# Patient Record
Sex: MALE | Race: WHITE | NOT HISPANIC OR LATINO | Employment: FULL TIME | URBAN - METROPOLITAN AREA
[De-identification: names, ages, dates, MRNs, and addresses within clinical notes are randomized per-mention and may not be internally consistent; named-entity substitution may affect disease eponyms.]

---

## 2017-01-26 DIAGNOSIS — M10.9 GOUT, UNSPECIFIED CAUSE, UNSPECIFIED CHRONICITY, UNSPECIFIED SITE: Primary | ICD-10-CM

## 2017-01-27 NOTE — TELEPHONE ENCOUNTER
Allopurinol Oral Tablet 300 MG      Last Written Prescription Date: 8/29/16  Last Fill Quantity: 30, # refills: 0  Last Office Visit with [facility], [facility] or [facility] prescribing provider:  11/15/16        URIC ACID   Date Value Ref Range Status   11/15/2016 5.5 3.5 - 8.5 mg/dL Final   ]  CREATININE   Date Value Ref Range Status   11/15/2016 0.96 0.66 - 1.25 mg/dL Final   ]  WBC      6.1   11/15/2016  RBC     5.26   11/15/2016  HGB     14.8   11/15/2016  HCT     45.5   11/15/2016  No components found with this name: mct  MCV       87   11/15/2016  MCH     28.1   11/15/2016  MCHC     32.5   11/15/2016  RDW     13.1   11/15/2016  PLT      188   11/15/2016  AST       38   11/15/2016  ALT       66   11/15/2016

## 2017-01-30 RX ORDER — ALLOPURINOL 300 MG/1
TABLET ORAL
Qty: 30 TABLET | Refills: 10 | Status: SHIPPED | OUTPATIENT
Start: 2017-01-30 | End: 2017-12-29

## 2017-01-30 NOTE — TELEPHONE ENCOUNTER
Prescription approved per [facility] Refill Protocol.  Bernie Hackett RN- Triage [employer]

## 2017-05-19 ENCOUNTER — OFFICE VISIT (OUTPATIENT)
Dept: FAMILY MEDICINE | Facility: CLINIC | Age: 47
End: 2017-05-19
Payer: COMMERCIAL

## 2017-05-19 VITALS
HEIGHT: 74 IN | DIASTOLIC BLOOD PRESSURE: 86 MMHG | HEART RATE: 71 BPM | WEIGHT: 315 LBS | SYSTOLIC BLOOD PRESSURE: 138 MMHG | RESPIRATION RATE: 14 BRPM | OXYGEN SATURATION: 96 % | BODY MASS INDEX: 40.43 KG/M2 | TEMPERATURE: 97.3 F

## 2017-05-19 DIAGNOSIS — T56.0X1S CHRONIC LEAD-INDUCED GOUT INVOLVING TOE WITHOUT TOPHUS, UNSPECIFIED LATERALITY, SEQUELA: ICD-10-CM

## 2017-05-19 DIAGNOSIS — M1A.1790 CHRONIC LEAD-INDUCED GOUT INVOLVING TOE WITHOUT TOPHUS, UNSPECIFIED LATERALITY, SEQUELA: ICD-10-CM

## 2017-05-19 DIAGNOSIS — I10 ESSENTIAL HYPERTENSION: Chronic | ICD-10-CM

## 2017-05-19 DIAGNOSIS — G47.33 OBSTRUCTIVE SLEEP APNEA SYNDROME: ICD-10-CM

## 2017-05-19 DIAGNOSIS — F33.41 RECURRENT MAJOR DEPRESSIVE DISORDER, IN PARTIAL REMISSION (H): Primary | ICD-10-CM

## 2017-05-19 DIAGNOSIS — Z13.1 SCREENING FOR DIABETES MELLITUS: ICD-10-CM

## 2017-05-19 DIAGNOSIS — F10.10 ALCOHOL ABUSE: ICD-10-CM

## 2017-05-19 DIAGNOSIS — M1A.4790 OTHER SECONDARY CHRONIC GOUT OF FOOT WITHOUT TOPHUS, UNSPECIFIED LATERALITY: ICD-10-CM

## 2017-05-19 DIAGNOSIS — Z87.891 PERSONAL HISTORY OF TOBACCO USE, PRESENTING HAZARDS TO HEALTH: ICD-10-CM

## 2017-05-19 DIAGNOSIS — M54.50 CHRONIC RIGHT-SIDED LOW BACK PAIN WITHOUT SCIATICA: ICD-10-CM

## 2017-05-19 DIAGNOSIS — E66.01 MORBID OBESITY DUE TO EXCESS CALORIES (H): ICD-10-CM

## 2017-05-19 DIAGNOSIS — Z80.0 FAMILY HISTORY OF COLON CANCER: ICD-10-CM

## 2017-05-19 DIAGNOSIS — G89.29 CHRONIC RIGHT-SIDED LOW BACK PAIN WITHOUT SCIATICA: ICD-10-CM

## 2017-05-19 DIAGNOSIS — I10 ESSENTIAL HYPERTENSION WITH GOAL BLOOD PRESSURE LESS THAN 140/90: ICD-10-CM

## 2017-05-19 DIAGNOSIS — F41.1 ANXIETY STATE: ICD-10-CM

## 2017-05-19 DIAGNOSIS — R09.02 HYPOXIA: ICD-10-CM

## 2017-05-19 PROCEDURE — 82947 ASSAY GLUCOSE BLOOD QUANT: CPT | Performed by: FAMILY MEDICINE

## 2017-05-19 PROCEDURE — 99215 OFFICE O/P EST HI 40 MIN: CPT | Performed by: FAMILY MEDICINE

## 2017-05-19 PROCEDURE — 84550 ASSAY OF BLOOD/URIC ACID: CPT | Performed by: FAMILY MEDICINE

## 2017-05-19 PROCEDURE — 84450 TRANSFERASE (AST) (SGOT): CPT | Performed by: FAMILY MEDICINE

## 2017-05-19 PROCEDURE — 84460 ALANINE AMINO (ALT) (SGPT): CPT | Performed by: FAMILY MEDICINE

## 2017-05-19 PROCEDURE — 36415 COLL VENOUS BLD VENIPUNCTURE: CPT | Performed by: FAMILY MEDICINE

## 2017-05-19 RX ORDER — CITALOPRAM HYDROBROMIDE 20 MG/1
20 TABLET ORAL DAILY
Qty: 30 TABLET | Refills: 0 | Status: SHIPPED | OUTPATIENT
Start: 2017-05-19 | End: 2017-06-02

## 2017-05-19 RX ORDER — METOPROLOL SUCCINATE 100 MG/1
100 TABLET, EXTENDED RELEASE ORAL DAILY
Qty: 90 TABLET | Refills: 0 | Status: SHIPPED | OUTPATIENT
Start: 2017-05-19 | End: 2018-01-10

## 2017-05-19 RX ORDER — LISINOPRIL 30 MG/1
30 TABLET ORAL DAILY
Qty: 90 TABLET | Refills: 0 | Status: SHIPPED | OUTPATIENT
Start: 2017-05-19 | End: 2018-01-10

## 2017-05-19 ASSESSMENT — ANXIETY QUESTIONNAIRES
2. NOT BEING ABLE TO STOP OR CONTROL WORRYING: MORE THAN HALF THE DAYS
IF YOU CHECKED OFF ANY PROBLEMS ON THIS QUESTIONNAIRE, HOW DIFFICULT HAVE THESE PROBLEMS MADE IT FOR YOU TO DO YOUR WORK, TAKE CARE OF THINGS AT HOME, OR GET ALONG WITH OTHER PEOPLE: SOMEWHAT DIFFICULT
1. FEELING NERVOUS, ANXIOUS, OR ON EDGE: NEARLY EVERY DAY
7. FEELING AFRAID AS IF SOMETHING AWFUL MIGHT HAPPEN: SEVERAL DAYS
GAD7 TOTAL SCORE: 11
3. WORRYING TOO MUCH ABOUT DIFFERENT THINGS: MORE THAN HALF THE DAYS
6. BECOMING EASILY ANNOYED OR IRRITABLE: MORE THAN HALF THE DAYS
5. BEING SO RESTLESS THAT IT IS HARD TO SIT STILL: NOT AT ALL

## 2017-05-19 ASSESSMENT — PATIENT HEALTH QUESTIONNAIRE - PHQ9: 5. POOR APPETITE OR OVEREATING: SEVERAL DAYS

## 2017-05-19 NOTE — Clinical Note
Please abstract the following data from this visit with this patient into the appropriate field in Epic:  Colonoscopy done on this date: NA (approximately), by this group: NA, results were Negative.

## 2017-05-19 NOTE — LETTER
My Depression Action Plan  Name: Homer Wells   Date of Birth 1970  Date: 5/19/2017    My doctor: Bernard Cruz   My clinic: [address] [address]  [phone]          GREEN    ZONE   Good Control    What it looks like:     Things are going generally well. You have normal up s and down s. You may even feel depressed from time to time, but bad moods usually last less than a day.   What you need to do:  1. Continue to care for yourself (see self care plan)  2. Check your depression survival kit and update it as needed  3. Follow your physician s recommendations including any medication.  4. Do not stop taking medication unless you consult with your physician first.           YELLOW         ZONE Getting Worse    What it looks like:     Depression is starting to interfere with your life.     It may be hard to get out of bed; you may be starting to isolate yourself from others.    Symptoms of depression are starting to last most all day and this has happened for several days.     You may have suicidal thoughts but they are not constant.   What you need to do:     1. Call your care team, your response to treatment will improve if you keep your care team informed of your progress. Yellow periods are signs an adjustment may need to be made.     2. Continue your self-care, even if you have to fake it!    3. Talk to someone in your support network    4. Open up your depression survival kit           RED    ZONE Medical Alert - Get Help    What it looks like:     Depression is seriously interfering with your life.     You may experience these or other symptoms: You can t get out of bed most days, can t work or engage in other necessary activities, you have trouble taking care of basic hygiene, or basic responsibilities, thoughts of suicide or death that will not go away, self-injurious behavior.     What you need  to do:  1. Call your care team and request a same-day appointment. If they are not available (weekends or after hours) call your local crisis line, emergency room or 911.      Electronically signed by: Liliana Mattson, May 19, 2017    Depression Self Care Plan / Survival Kit    Self-Care for Depression  Here s the deal. Your body and mind are really not as separate as most people think.  What you do and think affects how you feel and how you feel influences what you do and think. This means if you do things that people who feel good do, it will help you feel better.  Sometimes this is all it takes.  There is also a place for medication and therapy depending on how severe your depression is, so be sure to consult with your medical provider and/ or Behavioral Health Consultant if your symptoms are worsening or not improving.     In order to better manage my stress, I will:    Exercise  Get some form of exercise, every day. This will help reduce pain and release endorphins, the  feel good  chemicals in your brain. This is almost as good as taking antidepressants!  This is not the same as joining a gym and then never going! (they count on that by the way ) It can be as simple as just going for a walk or doing some gardening, anything that will get you moving.      Hygiene   Maintain good hygiene (Get out of bed in the morning, Make your bed, Brush your teeth, Take a shower, and Get dressed like you were going to work, even if you are unemployed).  If your clothes don't fit try to get ones that do.    Diet  I will strive to eat foods that are good for me, drink plenty of water, and avoid excessive sugar, caffeine, alcohol, and other mood-altering substances.  Some foods that are helpful in depression are: complex carbohydrates, B vitamins, flaxseed, fish or fish oil, fresh fruits and vegetables.    Psychotherapy  I agree to participate in Individual Therapy (if recommended).    Medication  If prescribed medications, I  agree to take them.  Missing doses can result in serious side effects.  I understand that drinking alcohol, or other illicit drug use, may cause potential side effects.  I will not stop my medication abruptly without first discussing it with my provider.    Staying Connected With Others  I will stay in touch with my friends, family members, and my primary care provider/team.    Use your imagination  Be creative.  We all have a creative side; it doesn t matter if it s oil painting, sand castles, or mud pies! This will also kick up the endorphins.    Witness Beauty  (AKA stop and smell the roses) Take a look outside, even in mid-winter. Notice colors, textures. Watch the squirrels and birds.     Service to others  Be of service to others.  There is always someone else in need.  By helping others we can  get out of ourselves  and remember the really important things.  This also provides opportunities for practicing all the other parts of the program.    Humor  Laugh and be silly!  Adjust your TV habits for less news and crime-drama and more comedy.    Control your stress  Try breathing deep, massage therapy, biofeedback, and meditation. Find time to relax each day.     My support system    Clinic Contact:  Phone number:    Contact 1:  Phone number:    Contact 2:  Phone number:    Presybeterian/:  Phone number:    Therapist:  Phone number:    Local crisis center:    Phone number:    Other community support:  Phone number:

## 2017-05-19 NOTE — PATIENT INSTRUCTIONS
1.  Weight Loss Tips  1. Do not eat after 6 hrs before your expected bedtime  2. Have your heaviest meal for breakfast, a slightly lighter meal at lunch and a snack 6 hrs before bed  3. No sugar/calorie drinks except milk ie no fruit juice, pop, alcohol.  4. Drink milk 30min before meals to decrease your hunger. Also it is excellent as part of your last meal of the day snack  5. Drink lots of water  6. Increase fiber in diet: all bran cereal, salads, popcorn etc  7. Have only one small serving of fruit a day about 1/2 cup (as this is high in sugar)  8. EXERCISE is the bottom line. Without it, you will gain weight even on a low calorie diet. Best if done 2-3X a day as can    Being overweight contributes to high blood pressure and high cholesterol, both of which cause heart attacks, strokes and kidney failure, prediabetes and diabetes, arthritis, and liver disease     2. Start the celexa  20mgm / tab   At 1/2 tab for 2-4 days   To alleviate the anxiety     See me in 2 weeks     YOU WILL NEED A COLONOSCOPY

## 2017-05-19 NOTE — NURSING NOTE
"Chief Complaint   Patient presents with     Recheck Medication     /86  Pulse 71  Temp 97.3  F (36.3  C) (Tympanic)  Resp 14  Ht 6' 1.5\" (1.867 m)  Wt (!) 327 lb (148.3 kg)  SpO2 96%  BMI 42.56 kg/m2 Estimated body mass index is 42.56 kg/(m^2) as calculated from the following:    Height as of this encounter: 6' 1.5\" (1.867 m).    Weight as of this encounter: 327 lb (148.3 kg).  BP completed using cuff size: large   Fanta Harrington CMA    Health Maintenance Due   Topic Date Due     JUAN C QUESTIONNAIRE 6 MONTHS  1970     PHQ-9 Q6 MONTHS (NO INBASKET)  05/15/2017     Health Maintenance reviewed at today's visit patient asked to schedule/complete:   Depression:  Patient agrees to schedule    "

## 2017-05-19 NOTE — LETTER
[facility]  [address]  [place] [address] [phone]                                                                                                           Homer Wells  [address] [place] [address]    May 24, 2017      Dear Homer,    The results of your recent tests were reviewed and are enclosed.   All of your lab results are normal, except as noted below.     The following are explanations of some of our lab tests     THIS DOES NOT MEAN THAT YOU HAD ALL OF THESE DONE     Please continue on the same medications unless a change is noted above     These are some general explanations for tests:     Hgb is the blood iron level   WBC means White Blood Cells   Platelets are small blood cells that help with forming the blood clots along with other blood factors.   Electrolytes are Sodium, Potassium, Calcium, Magnesium, Phosphorus.   Liver tests are: AST, ALT, Bilirubin, Alkaline Phosphatase.   Kidney tests are Creatinine, GFR.   HDL Cholesterol - is the good cholesterol and it is good to have it high.   LDL cholesterol is the bad cholesterol and it is good to have it low.   It is recommended to have LDL less than 130 for people with hypertension and to have it less than 100 for people with heart disease, diabetes and chronic kidney disease.   Triglycerides are another type of lipid and can also cause heart disease so should be kept low.   Thyroid tests are TSH, T4, T3   Glucose is sugar###yours is high   We need to rechek this in the future     The only way known to prevent diabetes or keep it from getting worse is exercise, 20-40 minutes 3 times a day around the time of meals as your insulin is wearing out  You need to get rid of the sugar using your muscles     A1c is a test that gives us an idea about how well was controlled the diabetes for the last 3 months.   PSA stands for Prostate Specific Antigen and it can be elevated  with prostate cancer or prostate inflammation.  Results for orders placed or performed in visit on 05/19/17   ALT   Result Value Ref [facility]    ALT 47 0 - 70 U/L   AST   Result Value Ref [facility]    AST 31 0 - 45 U/L   Glucose   Result Value Ref [facility]    Glucose 136 (H) 70 - 99 mg/dL   Uric acid   Result Value Ref [facility]    Uric Acid 5.7 3.5 - 7.2 mg/dL           Thank you for choosing [facility].  We appreciate the opportunity to serve you and look forward to supporting your healthcare needs in the future.    If you have any questions or concerns, please call me or my staff at [phone].      Sincerely,    Liliana Mattson MD

## 2017-05-19 NOTE — MR AVS SNAPSHOT
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  Weight Loss Tips  1. Do not eat after 6 hrs before your expected bedtime  2. Have your heaviest meal for breakfast, a slightly lighter meal at lunch and a snack 6 hrs before bed  3. No sugar/calorie drinks except milk ie no fruit juice, pop, alcohol.  4. Drink milk 30min before meals to decrease your hunger. Also it is excellent as part of your last meal of the day snack  5. Drink lots of water  6. Increase fiber in diet: all bran cereal, salads, popcorn etc  7. Have only one small serving of fruit a day about 1/2 cup (as this is high in sugar)  8. EXERCISE is the bottom line. Without it, you will gain weight even on a low calorie diet. Best if done 2-3X a day as can    Being overweight contributes to high blood pressure and high cholesterol, both of which cause heart attacks, strokes and kidney failure, prediabetes and diabetes, arthritis, and liver disease     2. Start the celexa  20mgm / tab   At 1/2 tab for 2-4 days   To alleviate the anxiety     See me in 2 weeks         Follow-ups after your visit        Who to contact     If you have  "questions or need follow up information about today's clinic visit or your schedule please contact [facility] directly at [phone].  Normal or non-critical lab and imaging results will be communicated to you by MyChart, letter or phone within 4 business days after the clinic has received the results. If you do not hear from us within 7 days, please contact the clinic through [employer] or phone. If you have a critical or abnormal lab result, we will notify you by phone as soon as possible.  Submit refill requests through [employer] or call your pharmacy and they will forward the refill request to us. Please allow 3 business days for your refill to be completed.          Additional Information About Your Visit        [employer] Information     [employer] lets you send messages to your doctor, view your test results, renew your prescriptions, schedule appointments and more. To sign up, go to [email or web address] . Click on \"Log in\" on the left side of the screen, which will take you to the Welcome page. Then click on \"Sign up Now\" on the right side of the page.     You will be asked to enter the access code listed below, as well as some personal information. Please follow the directions to create your username and password.     Your access code is: [email or web address]  Expires: 2017  2:33 PM     Your access code will  in 90 days. If you need help or a new code, please call your [facility] clinic or [phone].        Care EveryWhere ID     This is your Care EveryWhere ID. This could be used by other organizations to access your [facility] medical records  [id number]        Your Vitals Were     Pulse Temperature Respirations Height Pulse Oximetry BMI (Body Mass Index)    71 97.3  F (36.3  C) (Tympanic) 14 6' 1.5\" (1.867 m) 96% 42.56 kg/m2       Blood Pressure from Last 3 Encounters:   17 138/86   11/15/16 (!) 180/94   16 138/88    Weight from Last 3 Encounters:   17 (!) " 327 lb (148.3 kg)   11/15/16 (!) 332 lb (150.6 kg)   03/21/16 (!) 328 lb (148.8 kg)              We Performed the Following     ALT     AST     DEPRESSION ACTION PLAN (DAP)     Glucose     Uric acid          Today's Medication Changes          These changes are accurate as of: 5/19/17  2:33 PM.  If you have any questions, ask your nurse or doctor.               Start taking these medicines.        Dose/Directions    citalopram 20 MG tablet   Commonly known as:  celeXA   Used for:  Recurrent major depressive disorder, in partial remission (H)   Started by:  Liliana Mattson MD        Dose:  20 mg   Take 1 tablet (20 mg) by mouth daily   Quantity:  30 tablet   Refills:  0         These medicines have changed or have updated prescriptions.        Dose/Directions    metoprolol 100 MG 24 hr tablet   Commonly known as:  TOPROL-XL   This may have changed:  See the new instructions.   Used for:  Essential hypertension   Changed by:  Liliana Mattson MD        Dose:  100 mg   Take 1 tablet (100 mg) by mouth daily   Quantity:  90 tablet   Refills:  0            Where to get your medicines      These medications were sent to [id number] - [place], [place] - [address] [place] [address] [address] [facility] [address]     Phone:  [phone]     citalopram 20 MG tablet    lisinopril 30 MG tablet    metoprolol 100 MG 24 hr tablet                Primary Care Provider Office Phone # Fax #    Bernard Cruz [phone]       [facility] [address] [facility] [address]        Thank you!     Thank you for choosing [facility]  for your care. Our goal is always to provide you with excellent care. Hearing back from our patients is one way we can continue to improve our services. Please take a few minutes to complete the written survey that you may receive in the mail after your visit with us. Thank you!             Your Updated  Medication List - Protect others around you: Learn how to safely use, store and throw away your medicines at www.disposemymeds.org.          This list is accurate as of: 5/19/17  2:33 PM.  Always use your most recent med list.                   Brand Name Dispense Instructions for use    allopurinol 300 MG tablet    ZYLOPRIM    30 tablet    TAKE ONE TABLET BY MOUTH ONE TIME DAILY       citalopram 20 MG tablet    celeXA    30 tablet    Take 1 tablet (20 mg) by mouth daily       cyclobenzaprine 10 MG tablet    FLEXERIL    30 tablet    Take 0.5-1 tablets (5-10 mg) by mouth 3 times daily as needed for muscle spasms       lisinopril 30 MG tablet    PRINIVIL,ZESTRIL    90 tablet    Take 1 tablet (30 mg) by mouth daily       metoprolol 100 MG 24 hr tablet    TOPROL-XL    90 tablet    Take 1 tablet (100 mg) by mouth daily       MULTI VITAMIN MENS PO      Take 1 tablet by mouth.

## 2017-05-20 LAB
ALT SERPL W P-5'-P-CCNC: 47 U/L (ref 0–70)
AST SERPL W P-5'-P-CCNC: 31 U/L (ref 0–45)
GLUCOSE SERPL-MCNC: 136 MG/DL (ref 70–99)
URATE SERPL-MCNC: 5.7 MG/DL (ref 3.5–7.2)

## 2017-05-20 ASSESSMENT — ANXIETY QUESTIONNAIRES: GAD7 TOTAL SCORE: 11

## 2017-05-20 ASSESSMENT — PATIENT HEALTH QUESTIONNAIRE - PHQ9: SUM OF ALL RESPONSES TO PHQ QUESTIONS 1-9: 14

## 2017-05-20 ASSESSMENT — ASTHMA QUESTIONNAIRES: ACT_TOTALSCORE: 22

## 2017-05-24 NOTE — PROGRESS NOTES
Please see attached lab results  All of your lab results are normal, except as noted below.    The following are explanations of some of our lab tests    THIS DOES NOT MEAN THAT YOU HAD ALL OF THESE DONE    Please continue on the same medications unless a change is noted above    These are some general explanations for tests:    Hgb is the blood iron level  WBC means White Blood Cells  Platelets are small blood cells that help with forming the blood clots along with other blood factors.  Electrolytes are Sodium, Potassium, Calcium, Magnesium, Phosphorus.  Liver tests are: AST, ALT, Bilirubin, Alkaline Phosphatase.  Kidney tests are Creatinine, GFR.  HDL Cholesterol - is the good cholesterol and it is good to have it high.  LDL cholesterol is the bad cholesterol and it is good to have it low.  It is recommended to have LDL less than 130 for people with hypertension and to have it less than 100 for people with heart disease, diabetes and chronic kidney disease.  Triglycerides are another type of lipid and can also cause heart disease so should be kept low.   Thyroid tests are TSH, T4, T3  Glucose is sugar###yours is high   We need to rechek this in the future    The only way known to prevent diabetes or keep it from getting worse is exercise, 20-40 minutes 3 times a day around the time of meals as your insulin is wearing out  You need to get rid of the sugar using your muscles     A1c is a test that gives us an idea about how well was controlled the diabetes for the last 3 months.   PSA stands for Prostate Specific Antigen and it can be elevated with prostate cancer or prostate inflammation.

## 2017-06-02 ENCOUNTER — OFFICE VISIT (OUTPATIENT)
Dept: FAMILY MEDICINE | Facility: CLINIC | Age: 47
End: 2017-06-02
Payer: COMMERCIAL

## 2017-06-02 VITALS
DIASTOLIC BLOOD PRESSURE: 70 MMHG | OXYGEN SATURATION: 98 % | HEART RATE: 52 BPM | WEIGHT: 315 LBS | HEIGHT: 74 IN | TEMPERATURE: 96.8 F | BODY MASS INDEX: 40.43 KG/M2 | RESPIRATION RATE: 12 BRPM | SYSTOLIC BLOOD PRESSURE: 130 MMHG

## 2017-06-02 DIAGNOSIS — F10.10 ALCOHOL ABUSE: ICD-10-CM

## 2017-06-02 DIAGNOSIS — M1A.1790 CHRONIC LEAD-INDUCED GOUT INVOLVING TOE WITHOUT TOPHUS, UNSPECIFIED LATERALITY, SEQUELA: ICD-10-CM

## 2017-06-02 DIAGNOSIS — Z80.0 FAMILY HISTORY OF COLON CANCER: ICD-10-CM

## 2017-06-02 DIAGNOSIS — F41.1 ANXIETY STATE: ICD-10-CM

## 2017-06-02 DIAGNOSIS — I10 BENIGN ESSENTIAL HYPERTENSION: Primary | ICD-10-CM

## 2017-06-02 DIAGNOSIS — M54.50 ACUTE RIGHT-SIDED LOW BACK PAIN WITHOUT SCIATICA: ICD-10-CM

## 2017-06-02 DIAGNOSIS — F33.41 RECURRENT MAJOR DEPRESSIVE DISORDER, IN PARTIAL REMISSION (H): ICD-10-CM

## 2017-06-02 DIAGNOSIS — R73.01 IMPAIRED FASTING GLUCOSE: ICD-10-CM

## 2017-06-02 DIAGNOSIS — T56.0X1S CHRONIC LEAD-INDUCED GOUT INVOLVING TOE WITHOUT TOPHUS, UNSPECIFIED LATERALITY, SEQUELA: ICD-10-CM

## 2017-06-02 DIAGNOSIS — E66.01 MORBID OBESITY DUE TO EXCESS CALORIES (H): ICD-10-CM

## 2017-06-02 PROCEDURE — 99214 OFFICE O/P EST MOD 30 MIN: CPT | Performed by: FAMILY MEDICINE

## 2017-06-02 RX ORDER — CITALOPRAM HYDROBROMIDE 20 MG/1
20 TABLET ORAL DAILY
Qty: 90 TABLET | Refills: 1 | Status: SHIPPED | OUTPATIENT
Start: 2017-06-02 | End: 2017-12-16

## 2017-06-02 NOTE — PROGRESS NOTES
SUBJECTIVE:                                                    Homer Wells is a 47 year old male who presents to clinic today for the following health issues:    Depression and Anxiety Follow-Up    Status since last visit: worse    Start celexa 5-19-17    Other associated symptoms:None    Complicating factors:     Significant life event: No     Current substance abuse: None    PHQ-9 SCORE 3/21/2016 11/15/2016 5/19/2017   Total Score - - -   Total Score 2 8 14     JUAN C-7 SCORE 5/19/2017   Total Score 11      PHQ-9  English      PHQ-9   Any Language     GAD7     Hypertension Follow-up      Outpatient blood pressures are being checked at home.  Results are 133/78, 148/88.    Here  Up to  diastolic    Low Salt Diet: no added salt    Meds: toprol XL 1000 mg and lisinopril 30mgm      RT Sided Low Back Pain        Duration: Ongoing  Since 11-16        Specific cause: none    Description:   Location of pain: low back right  Character of pain: sharp  Pain radiation:none  New numbness or weakness in legs, not attributed to pain:  no     Intensity: Currently 0-1/10, worst 8/10-when spasming    History:   Pain interferes with job: YES  History of back problems: patient has prior back spasms  Any previous MRI or X-rays: yes-very long time ago, pt does not remember when  Sees a specialist for back pain:  No  Therapies tried without relief: naproxen, heat and ice    Alleviating factors:   Improved by: laying down      Precipitating factors:  Worsened by: Bending and Sitting    Functional and Psychosocial Screen (González STarT Back):      Not performed today   Accompanying Signs & Symptoms:  Risk of Fracture:  None  Risk of Cauda Equina:  None  Risk of Infection:  None  Risk of Cancer:  None  Risk of Ankylosing Spondylitis:  Onset at age <35, male, AND morning back stiffness. no              Gout  Duration: decades but no episodes for 10 + yrs despite drinking alcohol   Description   Location: big toe - bilateral  Joint  Swelling: YES  Redness: YES  Pain intensity:  moderate  Accompanying signs and symptoms: None  History  Previous history of gout: YES   Trauma to the area: no   Precipitating factors:   Alcohol usage: Frequent  Diuretic use: no   Recent illness: no   Therapies tried and outcome: allopurinol 300mgm a day and no gout since started despite continuing to drink  Uric acid conts < 6     ALCOHOL ABUSE      Duration: LATE TEENS     Description (location/character/radiation): off 5418-4506 but back on 3+++ beer 3-4 nites a wk     Intensity:  severe    Accompanying signs and symptoms: 0    History (similar episodes/previous evaluation): None    Precipitating or alleviating factors: None    Therapies tried and outcome: AA in past     LFTs never elevated despite this and morbid obesity     HYPOXIA/SLEEP APNEA       Duration: yrs     Description (location/character/radiation): oximetry 95%     Intensity:  mild    Accompanying signs and symptoms: fatigue     History (similar episodes/previous evaluation): None    Precipitating or alleviating factors: morbidly obese     Therapies tried and outcome:  c Pap q nite      TOBACCO ABUSE      1-2 ppd since 16 - 30 y/o     Sister is dying of lung ca at 51 y/o = a smoker     Assymptomatic     MORBID OBESITY      BMI = 43    Comorbid HTN     Hypoxic to 95%     FAMILY HX OF COLON CA      father with it at 64 y/o     Had 1st colonoscopy in 2003       Glucose Intolerance  Follow-up      Patient is checking blood sugars: not at all     FBS to 140    Diabetic concerns: None     Symptoms of hypoglycemia (low blood sugar): none     Paresthesias (numbness or burning in feet) or sores: No     Date of last diabetic eye exam: 2016                Amount of exercise or physical activity: None    Problems taking medications regularly: No    Medication side effects: none    Diet: regular (no restrictions)        Problem list and histories reviewed & adjusted, as indicated.  Additional history: as  "documented    Labs reviewed in EPIC    Reviewed and updated as needed this visit by clinical staff  Allergies  Meds  Problems       Reviewed and updated as needed this visit by Provider         ROS:  C: NEGATIVE for fever, chills, change in weight  I: NEGATIVE for worrisome rashes, moles or lesions  E: NEGATIVE for vision changes or irritation  E/M: NEGATIVE for ear, mouth and throat problems  R: NEGATIVE for significant cough or SOB  B: NEGATIVE for masses, tenderness or discharge  CV: NEGATIVE for chest pain, palpitations or peripheral edema  GI: NEGATIVE for nausea, abdominal pain, heartburn, or change in bowel habits  : NEGATIVE for frequency, dysuria, or hematuria  M: NEGATIVE for significant arthralgias or myalgia  N: NEGATIVE for weakness, dizziness or paresthesias  E: NEGATIVE for temperature intolerance, skin/hair changes  H: NEGATIVE for bleeding problems  PSYCHIATRIC: POSITIVE for, alcohol abuse, anxiety, depressed mood, HX anxiety, HX depression, insomnia  and weight gain    OBJECTIVE:                                                    /70  Pulse 52  Temp 96.8  F (36  C) (Tympanic)  Resp 12  Ht 6' 1.5\" (1.867 m)  Wt (!) 319 lb (144.7 kg)  SpO2 98%  BMI 41.52 kg/m2  Body mass index is 41.52 kg/(m^2).  GENERAL: healthy, alert, no distress and obese  EYES: Eyes grossly normal to inspection, PERRL and conjunctivae and sclerae normal  RESP: lungs clear to auscultation - no rales, rhonchi or wheezes  CV: regular rate and rhythm, normal S1 S2, no S3 or S4, no murmur, click or rub, no peripheral edema and peripheral pulses strong  MS: no gross musculoskeletal defects noted, no edema  SKIN: no suspicious lesions or rashes  NEURO: Normal strength and tone, mentation intact and speech normal  PSYCH: mentation appears normal, affect normal/bright    Diagnostic Test Results:  Results for orders placed or performed in visit on 05/19/17   ALT   Result Value Ref Range    ALT 47 0 - 70 U/L   AST "   Result Value Ref Range    AST 31 0 - 45 U/L   Glucose   Result Value Ref Range    Glucose 136 (H) 70 - 99 mg/dL   Uric acid   Result Value Ref Range    Uric Acid 5.7 3.5 - 7.2 mg/dL       LAB:  Liver still OK and wnl  Sugar high at 139  Uric acid still <6 on allopurinol  ASSESSMENT/PLAN:                                                              ICD-10-CM    1. Benign essential hypertension- uncontrolled on [id number] of additional I10    2. Recurrent major depressive disorder, in partial remission (H)-ISSUE OF treating  F33.41 citalopram (CELEXA) 20 MG tablet   3. Acute right-sided low back pain without sciatica M54.5    4. Impaired fasting glucose R73.01    5. Alcohol abuse since teens -off 1999-2012-still drinking  F10.10    6. Morbid obesity due to excess calories (H):BMI 40-50 w comorbid HTN  E66.01    7. Anxiety state F41.1    8. 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 Family history of colon cancer Z80.0        Patient Instructions   1. To decrease your blood pressure:   1) decrease your salt in your diet   2) increase greens   3) decrease red meat   4) increase fiber in diet   5) increase exercise    2. , Weight Loss Tips  1. Do not eat after 6 hrs before your expected bedtime  2. Have your heaviest meal for breakfast, a slightly lighter meal at lunch and a snack 6 hrs before bed  3. No sugar/calorie drinks except milk ie no fruit juice, pop, alcohol.  4. Drink milk 30min before meals to decrease your hunger. Also it is excellent as part of your last meal of the day snack  5. Drink lots of water  6. Increase fiber in diet: all bran cereal, salads, popcorn etc  7. Have only one small serving of fruit a day about 1/2 cup (as this is high in sugar)  8. EXERCISE is the bottom line. Without it, you will gain weight even on a low calorie diet. Best if done 2-3X a day as can    Being overweight contributes to high blood pressure and  high cholesterol, both of which cause heart attacks, strokes and kidney failure, prediabetes and diabetes, arthritis, and liver disease     3. Your blood pressure has been high or you are starting a new medication for it :   Please take 2-4 blood pressures and heart rates a week and write them down with date, time of day and location and bring this record in in 3-5 weeks. The optimal blood  pressure is < 140/80 or close to this most of the time.  chek every wk or 2  And call us if > 140/85     4. Flex ankles amap     5. Please stop the alcohol    6. See me in 5 mo    I  Explained the treatment and the reason for it   Discussed all with pt  And the patient expresses understanding  Pt does not want to stop drinking as feels he has no problem eg with his liver , but did discuss with him the wt gain and its risks,k the depresion and the high glucose   All from the alcohol     Liliana Mattson MD  [facility]    Weight management plan: Discussed healthy diet and exercise guidelines and patient will follow up in 6 months in clinic to re-evaluate.    Liliana Mattson MD

## 2017-06-02 NOTE — MR AVS SNAPSHOT
After Visit Summary   6/2/2017    Homer Wells    MRN: [id number]           Patient Information     Date Of Birth          1970        Visit Information        Provider Department      6/2/2017 1:20 PM Liliana Mattson MD [facility]        Today's Diagnoses     Benign essential hypertension- uncontrolled on meds     -  1    Acute right-sided low back pain without sciatica        Recurrent major depressive disorder, in partial remission (H)        Anxiety state        Alcohol abuse since teens -off 1999-2012-still drinking         Morbid obesity due to excess calories (H):BMI 40-50 w comorbid HTN         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 To decrease your blood pressure:   1) decrease your salt in your diet   2) increase greens   3) decrease red meat   4) increase fiber in diet   5) increase exercise    2. , Weight Loss Tips  1. Do not eat after 6 hrs before your expected bedtime  2. Have your heaviest meal for breakfast, a slightly lighter meal at lunch and a snack 6 hrs before bed  3. No sugar/calorie drinks except milk ie no fruit juice, pop, alcohol.  4. Drink milk 30min before meals to decrease your hunger. Also it is excellent as part of your last meal of the day snack  5. Drink lots of water  6. Increase fiber in diet: all bran cereal, salads, popcorn etc  7. Have only one small serving of fruit a day about 1/2 cup (as this is high in sugar)  8. EXERCISE is the bottom line. Without it, you will gain weight even on a low calorie diet. Best if done 2-3X a day as can    Being overweight contributes to high blood pressure and high cholesterol, both of which cause heart attacks, strokes and kidney failure,  "prediabetes and diabetes, arthritis, and liver disease     3. Your blood pressure has been high or you are starting a new medication for it :   Please take 2-4 blood pressures and heart rates a week and write them down with date, time of day and location and bring this record in in 3-5 weeks. The optimal blood  pressure is < 140/80 or close to this most of the time.  chek every wk or 2  And call us if > 140/85     4. Flex ankles amap     5. Please stop the alcohol    6. See me in 5 mo          Follow-ups after your visit        Follow-up notes from your care team     Return in about 6 months (around 12/2/2017).      Who to contact     If you have questions or need follow up information about today's clinic visit or your schedule please contact [facility] directly at [phone].  Normal or non-critical lab and imaging results will be communicated to you by MyChart, letter or phone within 4 business days after the clinic has received the results. If you do not hear from us within 7 days, please contact the clinic through MyChart or phone. If you have a critical or abnormal lab result, we will notify you by phone as soon as possible.  Submit refill requests through [employer] or call your pharmacy and they will forward the refill request to us. Please allow 3 business days for your refill to be completed.          Additional Information About Your Visit        Care EveryWhere ID     This is your Care EveryWhere ID. This could be used by other organizations to access your [place] medical records  [id number]        Your Vitals Were     Pulse Temperature Respirations Height Pulse Oximetry BMI (Body Mass Index)    52 96.8  F (36  C) (Tympanic) 12 6' 1.5\" (1.867 m) 98% 41.52 kg/m2       Blood Pressure from Last 3 Encounters:   06/02/17 130/70   05/19/17 138/86   11/15/16 (!) 180/94    Weight from Last 3 Encounters:   06/02/17 (!) 319 lb (144.7 kg)   05/19/17 (!) 327 lb (148.3 kg)   11/15/16 " (!) 332 lb (150.6 kg)              Today, you had the following     No orders found for display         Where to get your medicines      These medications were sent to [id number] - [place], [place] - [address]  [address] [place] [address]     Phone:  [phone]     citalopram 20 MG tablet          Primary Care Provider Office Phone # Fax #    Bernard Cruz [phone]       [facility] [address]  [facility] [address]        Thank you!     Thank you for choosing [facility]  for your care. Our goal is always to provide you with excellent care. Hearing back from our patients is one way we can continue to improve our services. Please take a few minutes to complete the written survey that you may receive in the mail after your visit with us. Thank you!             Your Updated Medication List - Protect others around you: Learn how to safely use, store and throw away your medicines at www.disposemymeds.org.          This list is accurate as of: 6/2/17  1:59 PM.  Always use your most recent med list.                   Brand Name Dispense Instructions for use    allopurinol 300 MG tablet    ZYLOPRIM    30 tablet    TAKE ONE TABLET BY MOUTH ONE TIME DAILY       citalopram 20 MG tablet    celeXA    90 tablet    Take 1 tablet (20 mg) by mouth daily       cyclobenzaprine 10 MG tablet    FLEXERIL    30 tablet    Take 0.5-1 tablets (5-10 mg) by mouth 3 times daily as needed for muscle spasms       lisinopril 30 MG tablet    PRINIVIL,ZESTRIL    90 tablet    Take 1 tablet (30 mg) by mouth daily       metoprolol 100 MG 24 hr tablet    TOPROL-XL    90 tablet    Take 1 tablet (100 mg) by mouth daily       MULTI VITAMIN MENS PO      Take 1 tablet by mouth.

## 2017-06-02 NOTE — NURSING NOTE
"Chief Complaint   Patient presents with     Recheck Medication     /70  Pulse 52  Temp 96.8  F (36  C) (Tympanic)  Resp 12  Ht 6' 1.5\" (1.867 m)  Wt (!) 319 lb (144.7 kg)  SpO2 98%  BMI 41.52 kg/m2 Estimated body mass index is 41.52 kg/(m^2) as calculated from the following:    Height as of this encounter: 6' 1.5\" (1.867 m).    Weight as of this encounter: 319 lb (144.7 kg).  BP completed using cuff size: kushal Harrington CMA    There are no preventive care reminders to display for this patient.  Health Maintenance reviewed at today's visit patient asked to schedule/complete:   None, Health Maintenance up to date.    "

## 2017-09-22 ENCOUNTER — TELEPHONE (OUTPATIENT)
Dept: FAMILY MEDICINE | Facility: CLINIC | Age: 47
End: 2017-09-22

## 2017-09-22 NOTE — LETTER
September 22, 2017    Homer Wells  [address] [place] [address]    Dear Roni Coronel cares about your health and your health plan.  I have reviewed your medical conditions, medication list and lab results, and am making recommendations based on this review to better manage your health.    You are in particular need of attention regarding:  -Depression/Anxiety    I am recommending that you:     -schedule a FOLLOWUP OFFICE APPOINTMENT with me.  Fill out PHQ-9 and send back in the envelope provided.      Fill out PHQ-9 and send back in the envelope provided.      Please call us at the [facility] location:  [phone] or use [employer] to address the above recommendations.     Thank you for trusting [facility].  We appreciate the opportunity to serve you and look forward to supporting your healthcare in the future.    If you have (or plan to have) any of these tests done at a facility other than a [facility] or a [facility], please have the results sent to the [facility] location noted above.      Best Regards,    Liliana Mattson MD

## 2017-09-22 NOTE — TELEPHONE ENCOUNTER
Panel Management Review      Patient has the following on his problem list:     Depression / Dysthymia review    Measure:  Needs PHQ-9 score of 4 or less during index window.  Administer PHQ-9 and if score is 5 or more, send encounter to provider for next steps.    5 - 7 month window range:     PHQ-9 SCORE 3/21/2016 11/15/2016 5/19/2017   Total Score - - -   Total Score 2 8 14       If PHQ-9 recheck is 5 or more, route to provider for next steps.    Patient is due for:  PHQ9        Composite cancer screening  Chart review shows that this patient is due/due soon for the following None  Summary:    Patient is due/failing the following:   PHQ9    Action needed:   Patient needs to do PHQ9.    Type of outreach:    Sent letter.    Questions for provider review:    None                                                                                                                                    Fanta Harrington CMA     Chart routed to  .

## 2017-12-29 DIAGNOSIS — M10.9 GOUT, UNSPECIFIED CAUSE, UNSPECIFIED CHRONICITY, UNSPECIFIED SITE: ICD-10-CM

## 2017-12-29 NOTE — TELEPHONE ENCOUNTER
Requested Prescriptions   Pending Prescriptions Disp Refills     allopurinol (ZYLOPRIM) 300 MG tablet  Last Written Prescription Date:  1/30/17  Last Fill Quantity: 30,  # refills: 10   Last Office Visit with G, P or [facility] prescribing provider:  6/2/2017   Future Office Visit:        30 tablet 10     Sig: Take 1 tablet (300 mg) by mouth daily    There is no refill protocol information for this order      Gout Agents Protocol Failed    12/29/2017  9:23 AM       Failed - CBC on file in past 12 months    Recent Labs   Lab Test  11/15/16   1501   WBC  6.1   RBC  5.26   HGB  14.8   HCT  45.5   PLT  188            Failed - Normal serum creatinine on file in the past 12 months    Recent Labs   Lab Test  11/15/16   1501   CR  0.96            Passed - ALT on file in past 12 months    Recent Labs   Lab Test  05/19/17   1435   ALT  47            Passed - Uric acid greater than or equal to 6 on file in past 12 months    Recent Labs   Lab Test  05/19/17   1435   URIC  5.7     If level is 6mg/dL or greater, ok to refill one time and refer to provider.          Passed - Recent or future visit with authorizing provider's specialty    Patient had office visit in the last year or has a visit in the next 30 days with authorizing provider.  See chart review.              Passed - Patient is age 18 or older

## 2017-12-30 DIAGNOSIS — M10.9 GOUT, UNSPECIFIED CAUSE, UNSPECIFIED CHRONICITY, UNSPECIFIED SITE: ICD-10-CM

## 2018-01-04 RX ORDER — ALLOPURINOL 300 MG/1
TABLET ORAL
Qty: 30 TABLET | Refills: 9 | OUTPATIENT
Start: 2018-01-04

## 2018-01-04 RX ORDER — ALLOPURINOL 300 MG/1
1 TABLET ORAL DAILY
Qty: 30 TABLET | Refills: 0 | Status: SHIPPED | OUTPATIENT
Start: 2018-01-04 | End: 2018-02-01

## 2018-01-04 NOTE — TELEPHONE ENCOUNTER
Requested Prescriptions   Pending Prescriptions Disp Refills     allopurinol (ZYLOPRIM) 300 MG tablet [Pharmacy Med Name: Allopurinol Oral Tablet 300 MG]  Last Written Prescription Date:  1/30/2017  Last Fill Quantity: 30 tablet,  # refills: 10   Last Office Visit with G, P or [facility] prescribing provider:  6/2/2017   Future Office Visit:      30 tablet 9     Sig: TAKE 1 TABLET BY MOUTH ONCE DAILY    Gout Agents Protocol Failed    12/30/2017  9:30 AM       Failed - CBC on file in past 12 months    Recent Labs   Lab Test  11/15/16   1501   WBC  6.1   RBC  5.26   HGB  14.8   HCT  45.5   PLT  188          Failed - Normal serum creatinine on file in the past 12 months    Recent Labs   Lab Test  11/15/16   1501   CR  0.96          Passed - ALT on file in past 12 months    Recent Labs   Lab Test  05/19/17   1435   ALT  47          Passed - Uric acid greater than or equal to 6 on file in past 12 months    Recent Labs   Lab Test  05/19/17   1435   URIC  5.7     If level is 6mg/dL or greater, ok to refill one time and refer to provider.          Passed - Recent or future visit with authorizing provider's specialty    Patient had office visit in the last year or has a visit in the next 30 days with authorizing provider.  See chart review.          Passed - Patient is age 18 or older

## 2018-01-04 NOTE — TELEPHONE ENCOUNTER
Routing refill request to provider for review/approval because:  Labs not current:  CBC and creatinine- thanks

## 2018-01-10 DIAGNOSIS — I10 ESSENTIAL HYPERTENSION: Chronic | ICD-10-CM

## 2018-01-10 DIAGNOSIS — I10 ESSENTIAL HYPERTENSION WITH GOAL BLOOD PRESSURE LESS THAN 140/90: ICD-10-CM

## 2018-01-12 RX ORDER — METOPROLOL SUCCINATE 100 MG/1
TABLET, EXTENDED RELEASE ORAL
Qty: 90 TABLET | Refills: 1 | Status: SHIPPED | OUTPATIENT
Start: 2018-01-12 | End: 2018-05-31 | Stop reason: DRUGHIGH

## 2018-01-12 RX ORDER — LISINOPRIL 30 MG/1
TABLET ORAL
Qty: 90 TABLET | Refills: 1 | Status: SHIPPED | OUTPATIENT
Start: 2018-01-12 | End: 2018-05-04

## 2018-01-12 NOTE — TELEPHONE ENCOUNTER
"Requested Prescriptions   Pending Prescriptions Disp Refills     lisinopril (PRINIVIL,ZESTRIL) 30 MG tablet [Pharmacy Med Name: Lisinopril Oral Tablet 30 MG] 90 tablet 0     Sig: TAKE 1 TABLET (30 MG) BY MOUTH DAILY    ACE Inhibitors (Including Combos) Protocol Failed    1/10/2018  4:08 PM       Failed - Normal serum creatinine on file in past 12 months    Recent Labs   Lab Test  11/15/16   1501   CR  0.96            Failed - Normal serum potassium on file in past 12 months    Recent Labs   Lab Test  11/15/16   1501   POTASSIUM  3.7            Passed - Blood pressure under 140/90    BP Readings from Last 3 Encounters:   06/02/17 130/70   05/19/17 138/86   11/15/16 (!) 180/94                Passed - Recent or future visit with authorizing provider's specialty    Patient had office visit in the last year or has a visit in the next 30 days with authorizing provider.  See \"Patient Info\" tab in inbasket, or \"Choose Columns\" in Meds & Orders section of the refill encounter.              Passed - Patient is age 18 or older        metoprolol succinate (TOPROL-XL) 100 MG 24 hr tablet [Pharmacy Med Name: Metoprolol Succinate ER Oral Tablet Extended Release 24 Hour 100 MG] 90 tablet 0     Sig: TAKE 1 TABLET (100 MG) BY MOUTH DAILY    Beta-Blockers Protocol Passed    1/10/2018  4:08 PM       Passed - Blood pressure under 140/90    BP Readings from Last 3 Encounters:   06/02/17 130/70   05/19/17 138/86   11/15/16 (!) 180/94                Passed - Patient is age 6 or older       Passed - Recent or future visit with authorizing provider's specialty    Patient had office visit in the last year or has a visit in the next 30 days with authorizing provider.  See \"Patient Info\" tab in inbasket, or \"Choose Columns\" in Meds & Orders section of the refill encounter.               Prescription approved per [facility] Refill Protocol.    "

## 2018-02-01 DIAGNOSIS — M10.9 GOUT, UNSPECIFIED CAUSE, UNSPECIFIED CHRONICITY, UNSPECIFIED SITE: ICD-10-CM

## 2018-02-01 DIAGNOSIS — I10 BENIGN ESSENTIAL HYPERTENSION: Primary | ICD-10-CM

## 2018-02-02 NOTE — TELEPHONE ENCOUNTER
" Routing refill request to provider for review/approval because:  Patient is due for creatinine check.  Routing to Dr Cruz to see if he would like to order other labs    Bernie Hackett RN- Triage [employer]          Requested Prescriptions   Pending Prescriptions Disp Refills     allopurinol (ZYLOPRIM) 300 MG tablet [Pharmacy Med Name: Allopurinol Oral Tablet 300 MG] 30 tablet 0     Sig: TAKE ONE TABLET BY MOUTH ONE TIME DAILY    Gout Agents Protocol Failed    2/1/2018  7:01 AM       Failed - CBC on file in past 12 months    Recent Labs   Lab Test  11/15/16   1501   WBC  6.1   RBC  5.26   HGB  14.8   HCT  45.5   PLT  188            Failed - Uric acid greater than or equal to 6 on file in past 12 months    Recent Labs   Lab Test  05/19/17   1435   URIC  5.7     If level is 6mg/dL or greater, ok to refill one time and refer to provider.          Failed - Normal serum creatinine on file in the past 12 months    Recent Labs   Lab Test  11/15/16   1501   CR  0.96            Passed - ALT on file in past 12 months    Recent Labs   Lab Test  05/19/17   1435   ALT  47            Passed - Recent or future visit with authorizing provider's specialty    Patient had office visit in the last year or has a visit in the next 30 days with authorizing provider.  See \"Patient Info\" tab in inbasket, or \"Choose Columns\" in Meds & Orders section of the refill encounter.            Passed - Patient is age 18 or older          "

## 2018-02-03 RX ORDER — ALLOPURINOL 300 MG/1
TABLET ORAL
Qty: 30 TABLET | Refills: 0 | Status: SHIPPED | OUTPATIENT
Start: 2018-02-03 | End: 2018-03-04

## 2018-03-04 DIAGNOSIS — M10.9 GOUT, UNSPECIFIED CAUSE, UNSPECIFIED CHRONICITY, UNSPECIFIED SITE: ICD-10-CM

## 2018-03-05 NOTE — TELEPHONE ENCOUNTER
"Requested Prescriptions   Pending Prescriptions Disp Refills     allopurinol (ZYLOPRIM) 300 MG tablet [Pharmacy Med Name: Allopurinol Oral Tablet 300 MG]  Last Written Prescription Date:  2-3-18  Last Fill Quantity: 30 tab,  # refills: 0   Last Office Visit  6/2/2017        with  G, P or [facility] prescribing provider:     Future Office Visit:        30 tablet 0     Sig: TAKE ONE TABLET BY MOUTH ONE TIME DAILY    Gout Agents Protocol Failed    3/4/2018  7:01 AM       Failed - CBC on file in past 12 months    Recent Labs   Lab Test  11/15/16   1501   WBC  6.1   RBC  5.26   HGB  14.8   HCT  45.5   PLT  188            Failed - Uric acid greater than or equal to 6 on file in past 12 months    Recent Labs   Lab Test  05/19/17   1435   URIC  5.7     If level is 6mg/dL or greater, ok to refill one time and refer to provider.          Failed - Normal serum creatinine on file in the past 12 months    Recent Labs   Lab Test  11/15/16   1501   CR  0.96            Passed - ALT on file in past 12 months    Recent Labs   Lab Test  05/19/17   1435   ALT  47            Passed - Recent (12 mo) or future (30 days) visit within the authorizing provider's specialty    Patient had office visit in the last year or has a visit in the next 30 days with authorizing provider.  See \"Patient Info\" tab in inbasket, or \"Choose Columns\" in Meds & Orders section of the refill encounter.            Passed - Patient is age 18 or older            "

## 2018-03-06 RX ORDER — ALLOPURINOL 300 MG/1
TABLET ORAL
Qty: 30 TABLET | Refills: 0 | Status: SHIPPED | OUTPATIENT
Start: 2018-03-06 | End: 2018-07-21

## 2018-03-06 NOTE — TELEPHONE ENCOUNTER
Routing refill request to provider for review/approval because:  Labs not current:  Uric  Acid, cbc, creatinine-labs pending for approval

## 2018-05-01 DIAGNOSIS — F33.41 RECURRENT MAJOR DEPRESSIVE DISORDER, IN PARTIAL REMISSION (H): ICD-10-CM

## 2018-05-01 NOTE — TELEPHONE ENCOUNTER
"Requested Prescriptions   Pending Prescriptions Disp Refills     citalopram (CELEXA) 20 MG tablet [Pharmacy Med Name: Citalopram Hydrobromide Oral Tablet 20 MG]  Last Written Prescription Date:  3/29/2018  Last Fill Quantity: 30 tablet,  # refills: 0   Last Office Visit: 6/2/2017   Future Office Visit:       PHQ-9 SCORE 3/21/2016 11/15/2016 5/19/2017   Total Score - - -   Total Score 2 8 14     JUAN C-7 SCORE 5/19/2017   Total Score 11    30 tablet 0     Sig: TAKE 1 TABLET BY MOUTH ONE TIME DAILY    SSRIs Protocol Failed    5/1/2018  7:02 AM       Failed - PHQ-9 score less than 5 in past 6 months    Please review last PHQ-9 score.          Failed - Recent (6 mo) or future (30 days) visit within the authorizing provider's specialty    Patient had office visit in the last 6 months or has a visit in the next 30 days with authorizing provider or within the authorizing provider's specialty.  See \"Patient Info\" tab in inbasket, or \"Choose Columns\" in Meds & Orders section of the refill encounter.           Passed - Patient is age 18 or older          "

## 2018-05-02 ENCOUNTER — TELEPHONE (OUTPATIENT)
Dept: FAMILY MEDICINE | Facility: CLINIC | Age: 48
End: 2018-05-02

## 2018-05-02 NOTE — TELEPHONE ENCOUNTER
Message left on answering machine asking patient to call triage.  Needs PHQ9 done.  Patient is also overdue for OV re depression

## 2018-05-02 NOTE — TELEPHONE ENCOUNTER
Panel Management Review      Patient has the following on his problem list:     Depression / Dysthymia review    Measure:  Needs PHQ-9 score of 4 or less during index window.  Administer PHQ-9 and if score is 5 or more, send encounter to provider for next steps.    5 - 7 month window range:     PHQ-9 SCORE 3/21/2016 11/15/2016 5/19/2017   Total Score - - -   Total Score 2 8 14       If PHQ-9 recheck is 5 or more, route to provider for next steps.    Patient is due for:  PHQ9      Composite cancer screening  Chart review shows that this patient is due/due soon for the following None  Summary:    Patient is due/failing the following:   PHQ9    Action needed:   Patient needs to do PHQ9.    Type of outreach:    Sent letter.    Questions for provider review:    None                                                                                                                                    Fanta Harrington CMA       Chart routed to NA .

## 2018-05-02 NOTE — LETTER
May 2, 2018    Homer Wells  [address] [place] [address]    Dear Roni Coronel cares about your health and your health plan.  I have reviewed your medical conditions, medication list and lab results, and am making recommendations based on this review to better manage your health.    You are in particular need of attention regarding:  -Depression/Anxiety    I am recommending that you:     -schedule a FOLLOWUP OFFICE APPOINTMENT with me.         Please call us at the [facility] location:  [phone] or use [employer] to address the above recommendations.     Thank you for trusting [facility].  We appreciate the opportunity to serve you and look forward to supporting your healthcare in the future.    If you have (or plan to have) any of these tests done at a facility other than a [facility] or a [facility], please have the results sent to the [facility] location noted above.      Best Regards,    Liliana Mattson MD

## 2018-05-04 ENCOUNTER — OFFICE VISIT (OUTPATIENT)
Dept: FAMILY MEDICINE | Facility: CLINIC | Age: 48
End: 2018-05-04
Payer: COMMERCIAL

## 2018-05-04 VITALS
HEART RATE: 68 BPM | OXYGEN SATURATION: 94 % | DIASTOLIC BLOOD PRESSURE: 88 MMHG | WEIGHT: 315 LBS | TEMPERATURE: 97.8 F | HEIGHT: 74 IN | BODY MASS INDEX: 40.43 KG/M2 | SYSTOLIC BLOOD PRESSURE: 138 MMHG | RESPIRATION RATE: 18 BRPM

## 2018-05-04 DIAGNOSIS — G47.00 PERSISTENT INSOMNIA: ICD-10-CM

## 2018-05-04 DIAGNOSIS — F33.41 RECURRENT MAJOR DEPRESSIVE DISORDER, IN PARTIAL REMISSION (H): ICD-10-CM

## 2018-05-04 DIAGNOSIS — I10 BENIGN ESSENTIAL HYPERTENSION: Primary | ICD-10-CM

## 2018-05-04 DIAGNOSIS — R09.02 HYPOXIA: ICD-10-CM

## 2018-05-04 DIAGNOSIS — Z80.0 FAMILY HISTORY OF COLON CANCER: ICD-10-CM

## 2018-05-04 DIAGNOSIS — F41.1 ANXIETY STATE: ICD-10-CM

## 2018-05-04 DIAGNOSIS — M1A.4790 OTHER SECONDARY CHRONIC GOUT OF FOOT WITHOUT TOPHUS, UNSPECIFIED LATERALITY: ICD-10-CM

## 2018-05-04 DIAGNOSIS — Z12.5 SCREENING FOR PROSTATE CANCER: ICD-10-CM

## 2018-05-04 DIAGNOSIS — E66.01 MORBID OBESITY DUE TO EXCESS CALORIES (H): ICD-10-CM

## 2018-05-04 DIAGNOSIS — F10.10 ALCOHOL ABUSE: ICD-10-CM

## 2018-05-04 DIAGNOSIS — R73.01 IMPAIRED FASTING GLUCOSE: ICD-10-CM

## 2018-05-04 PROBLEM — M1A.1790: Status: RESOLVED | Noted: 2017-05-19 | Resolved: 2018-05-04

## 2018-05-04 PROBLEM — T56.0X1S: Status: RESOLVED | Noted: 2017-05-19 | Resolved: 2018-05-04

## 2018-05-04 PROBLEM — F10.982 ALCOHOL-INDUCED INSOMNIA (H): Status: ACTIVE | Noted: 2018-05-04

## 2018-05-04 PROCEDURE — 82977 ASSAY OF GGT: CPT | Performed by: FAMILY MEDICINE

## 2018-05-04 PROCEDURE — 36415 COLL VENOUS BLD VENIPUNCTURE: CPT | Performed by: FAMILY MEDICINE

## 2018-05-04 PROCEDURE — 84450 TRANSFERASE (AST) (SGOT): CPT | Performed by: FAMILY MEDICINE

## 2018-05-04 PROCEDURE — 99214 OFFICE O/P EST MOD 30 MIN: CPT | Performed by: FAMILY MEDICINE

## 2018-05-04 PROCEDURE — 84460 ALANINE AMINO (ALT) (SGPT): CPT | Performed by: FAMILY MEDICINE

## 2018-05-04 PROCEDURE — G0103 PSA SCREENING: HCPCS | Performed by: FAMILY MEDICINE

## 2018-05-04 PROCEDURE — 80048 BASIC METABOLIC PNL TOTAL CA: CPT | Performed by: FAMILY MEDICINE

## 2018-05-04 PROCEDURE — 84550 ASSAY OF BLOOD/URIC ACID: CPT | Performed by: FAMILY MEDICINE

## 2018-05-04 RX ORDER — METOPROLOL SUCCINATE 50 MG/1
50 TABLET, EXTENDED RELEASE ORAL DAILY
Qty: 30 TABLET | Refills: 0 | Status: SHIPPED | OUTPATIENT
Start: 2018-05-04 | End: 2018-05-31 | Stop reason: DRUGHIGH

## 2018-05-04 RX ORDER — LISINOPRIL 40 MG/1
40 TABLET ORAL DAILY
Qty: 90 TABLET | Refills: 0 | Status: SHIPPED | OUTPATIENT
Start: 2018-05-04 | End: 2019-07-22

## 2018-05-04 RX ORDER — TRAZODONE HYDROCHLORIDE 50 MG/1
50 TABLET, FILM COATED ORAL
Qty: 30 TABLET | Refills: 0 | Status: SHIPPED | OUTPATIENT
Start: 2018-05-04 | End: 2018-05-31

## 2018-05-04 ASSESSMENT — ANXIETY QUESTIONNAIRES
7. FEELING AFRAID AS IF SOMETHING AWFUL MIGHT HAPPEN: SEVERAL DAYS
GAD7 TOTAL SCORE: 9
5. BEING SO RESTLESS THAT IT IS HARD TO SIT STILL: NOT AT ALL
1. FEELING NERVOUS, ANXIOUS, OR ON EDGE: MORE THAN HALF THE DAYS
6. BECOMING EASILY ANNOYED OR IRRITABLE: MORE THAN HALF THE DAYS
IF YOU CHECKED OFF ANY PROBLEMS ON THIS QUESTIONNAIRE, HOW DIFFICULT HAVE THESE PROBLEMS MADE IT FOR YOU TO DO YOUR WORK, TAKE CARE OF THINGS AT HOME, OR GET ALONG WITH OTHER PEOPLE: VERY DIFFICULT
2. NOT BEING ABLE TO STOP OR CONTROL WORRYING: SEVERAL DAYS
3. WORRYING TOO MUCH ABOUT DIFFERENT THINGS: MORE THAN HALF THE DAYS

## 2018-05-04 ASSESSMENT — PATIENT HEALTH QUESTIONNAIRE - PHQ9: 5. POOR APPETITE OR OVEREATING: SEVERAL DAYS

## 2018-05-04 NOTE — TELEPHONE ENCOUNTER
Routing to Dr Mattson.  Was this medication addressed today?    Bernie Hackett RN- Triage [employer]

## 2018-05-04 NOTE — LETTER
May 18, 2018      Homer STEVENS Priscilla  [address] [place] [address]        Dear ,    We are writing to inform you of your test results.    All of your lab results are normal, except as noted below.     The following are explanations of some of our lab tests     THIS DOES NOT MEAN THAT YOU HAD ALL OF THESE DONE     Please continue on the same medications unless a change is noted above     These are some general explanations for tests:     Hgb is the blood iron level   WBC means White Blood Cells   Platelets are small blood cells that help with forming the blood clots along with other blood factors.   Electrolytes are Sodium, Potassium, Calcium, Magnesium, Phosphorus.   Liver tests are: AST, ALT, Bilirubin, Alkaline Phosphatase.& GGT  Which is moderately high ####we need to rechek this ###     Kidney tests are Creatinine, GFR.   HDL Cholesterol - is the good cholesterol and it is good to have it high.   LDL cholesterol is the bad cholesterol and it is good to have it low.   It is recommended to have LDL less than 130 for people with hypertension and to have it less than 100 for people with heart disease, diabetes and chronic kidney disease.   Triglycerides are another type of lipid and can also cause heart disease so should be kept low.   Thyroid tests are TSH, T4, T3   Glucose is sugar#####It is moderately high at 136##we should also rechek this with a better study ###     A1c is a test that gives us an idea about how well was controlled the diabetes for the last 3 months.   PSA stands for Prostate Specific Antigen and it can be elevated with prostate cancer or prostate inflammation.     Uric Acid --gout ---is normal --continue the same meds    Resulted Orders   Prostate spec antigen screen   Result Value Ref Range    PSA 1.98 0 - 4 ug/L      Comment:      Assay Method:  Chemiluminescence using Siemens Vista analyzer   Basic metabolic panel   Result Value Ref Range    Sodium 140 133 - 144 mmol/L     Potassium 4.0 3.4 - 5.3 mmol/L    Chloride 106 94 - 109 mmol/L    Carbon Dioxide 29 20 - 32 mmol/L    Anion Gap 5 3 - 14 mmol/L    Glucose 99 70 - 99 mg/dL    Urea Nitrogen 18 7 - 30 mg/dL    Creatinine 0.89 0.66 - 1.25 mg/dL    GFR Estimate 90 >60 mL/min/1.7m2      Comment:      Non  GFR Calc    GFR Estimate If Black >90 >60 mL/min/1.7m2      Comment:       GFR Calc    Calcium 9.4 8.5 - 10.1 mg/dL   Uric acid   Result Value Ref Range    Uric Acid 5.5 3.5 - 7.2 mg/dL   ALT   Result Value Ref Range    ALT 48 0 - 70 U/L   AST   Result Value Ref Range    AST 33 0 - 45 U/L   GGT   Result Value Ref Range    GGT 96 (H) 0 - 75 U/L       If you have any questions or concerns, please call the clinic at the number listed above.       Sincerely,        Liliana Mattson MD

## 2018-05-04 NOTE — MR AVS SNAPSHOT
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 Shingrex is a 2 shot series that prevents shingles 97% of the time, as opposed to the old shingles shot that only prevented it at 40-50%  It costs less for medicare at a pharmacy  You should get it starting at 50 yrs old     2.  Weight Loss Tips  1. Do not eat after 6 hrs before your expected bedtime  2. Have your heaviest meal for breakfast, a slightly lighter meal at lunch and a snack 6 hrs before bed  3. No sugar/calorie drinks except milk ie no fruit juice, pop, alcohol.  4. Drink milk 30min before meals to decrease your hunger. Also it is excellent as part of your last meal of the day snack  5. Drink lots of water  6. Increase fiber in diet: all bran cereal, salads, popcorn etc  7. Have only one small serving of fruit a day about 1/2 cup (as this is high in sugar)  8. EXERCISE is the bottom line. Without it, you will gain weight even on a low calorie diet. Best if done 2-3X a day as can    Being overweight contributes to high blood  pressure and high cholesterol, both of which cause heart attacks, strokes and kidney failure, prediabetes and diabetes, arthritis, and liver disease     4. Shingrex is a 2 shot series that prevents shingles 97% of the time, as opposed to the old shingles shot that only prevented it at 40-50%  It costs less for medicare at a pharmacy  You should get it starting at 50 yrs old     5.  Eat calcium : dairy and greens  Do not take calcium in pill form as it can plaque on the heart arteries and cause kidney stones    6. Your blood pressure has been high or you are starting a new medication for it :1) increase lisinopril from 30- to 40mgm  2) increase the metoprolol from one pill of 100mgm a day  To taking one of 100mgm and one of 50mgm    Please take 2-4 blood pressures and heart rates a week and write them down with date, time of day and location and bring this record in in 3-5 weeks. The optimal blood  pressure is < 140/80 or close to this most of the time.    7. For the depression :  A. Continue the citalopram   B.   Add in trazodone at bedtime every nite   Start with 50mgm and can go to 100mgm   This treats the insomnia, the depression and anxiety             Follow-ups after your visit        Follow-up notes from your care team     Return in about 1 month (around 6/4/2018) for BP Recheck.      Who to contact     If you have questions or need follow up information about today's clinic visit or your schedule please contact [facility] directly at [phone].  Normal or non-critical lab and imaging results will be communicated to you by MyChart, letter or phone within 4 business days after the clinic has received the results. If you do not hear from us within 7 days, please contact the clinic through MyChart or phone. If you have a critical or abnormal lab result, we will notify you by phone as soon as possible.  Submit refill requests through [employer] or call your pharmacy and they will  "forward the refill request to us. Please allow 3 business days for your refill to be completed.          Additional Information About Your Visit        [employer] Information     [employer] lets you send messages to your doctor, view your test results, renew your prescriptions, schedule appointments and more. To sign up, go to [email or web address] . Click on \"Log in\" on the left side of the screen, which will take you to the Welcome page. Then click on \"Sign up Now\" on the right side of the page.     You will be asked to enter the access code listed below, as well as some personal information. Please follow the directions to create your username and password.     Your access code is: [email or web address]  Expires: 2018  3:15 PM     Your access code will  in 90 days. If you need help or a new code, please call your [place] clinic or [phone].        Care EveryWhere ID     This is your Care EveryWhere ID. This could be used by other organizations to access your [place] medical records  [id number]        Your Vitals Were     Pulse Temperature Respirations Height Pulse Oximetry BMI (Body Mass Index)    68 97.8  F (36.6  C) (Tympanic) 18 6' 1.5\" (1.867 m) 94% 42.56 kg/m2       Blood Pressure from Last 3 Encounters:   18 138/88   17 130/70   17 138/86    Weight from Last 3 Encounters:   18 327 lb (148.3 kg)   17 (!) 319 lb (144.7 kg)   17 (!) 327 lb (148.3 kg)              We Performed the Following     ALT     AST     Basic metabolic panel     DEPRESSION ACTION PLAN (DAP)     GGT     Prostate spec antigen screen     Uric acid          Today's Medication Changes          These changes are accurate as of 18  3:15 PM.  If you have any questions, ask your nurse or doctor.               Start taking these medicines.        Dose/Directions    traZODone 50 MG tablet   Commonly known as:  DESYREL   Used for:  Recurrent major depressive disorder, in partial remission (H), Persistent " insomnia   Started by:  Liliana Mattson MD        Dose:  50 mg   Take 1 tablet (50 mg) by mouth nightly as needed for sleep   Quantity:  30 tablet   Refills:  0         These medicines have changed or have updated prescriptions.        Dose/Directions    lisinopril 40 MG tablet   Commonly known as:  PRINIVIL/ZESTRIL   This may have changed:  See the new instructions.   Used for:  Benign essential hypertension   Changed by:  Liliana Mattson MD        Dose:  40 mg   Take 1 tablet (40 mg) by mouth daily   Quantity:  90 tablet   Refills:  0       * metoprolol succinate 100 MG 24 hr tablet   Commonly known as:  TOPROL-XL   This may have changed:  Another medication with the same name was added. Make sure you understand how and when to take each.   Used for:  Essential hypertension   Changed by:  Liliana Mattson MD        TAKE 1 TABLET (100 MG) BY MOUTH DAILY   Quantity:  90 tablet   Refills:  1       * metoprolol succinate 50 MG 24 hr tablet   Commonly known as:  TOPROL-XL   This may have changed:  You were already taking a medication with the same name, and this prescription was added. Make sure you understand how and when to take each.   Used for:  Benign essential hypertension   Changed by:  Liliana Mattson MD        Dose:  50 mg   Take 1 tablet (50 mg) by mouth daily   Quantity:  30 tablet   Refills:  0       * Notice:  This list has 2 medication(s) that are the same as other medications prescribed for you. Read the directions carefully, and ask your doctor or other care provider to review them with you.         Where to get your medicines      These medications were sent to [id number] - [place], [place] - [address] [place] [address] N.  [address] [place] [address] [place] [address]     Phone:  [phone]     metoprolol succinate 50 MG 24 hr tablet    traZODone 50 MG tablet         Some of these will need a paper prescription and others can be bought over the counter.  Ask your  nurse if you have questions.     Bring a paper prescription for each of these medications     lisinopril 40 MG tablet                Primary Care Provider Office Phone # Fax #    Bernard Cruz [phone] [address] [facility] [address]        Equal Access to Services     YEN MONTANO : Hadii aad ku hadasho Soomaali, waaxda luqadaha, qaybta kaalmada adeegyada, waxay chichoin hayaan adealejandro ordonezlorenacecile campos. So [facility] [phone].    ATENCIÓN: Si habla español, tiene a torres disposición servicios gratuitos de asistencia lingüística. [facility] [phone].    We comply with applicable federal civil rights laws and [place] laws. We do not discriminate on the basis of race, color, national origin, age, disability, sex, sexual orientation, or gender identity.            Thank you!     Thank you for choosing [facility]  for your care. Our goal is always to provide you with excellent care. Hearing back from our patients is one way we can continue to improve our services. Please take a few minutes to complete the written survey that you may receive in the mail after your visit with us. Thank you!             Your Updated Medication List - Protect others around you: Learn how to safely use, store and throw away your medicines at www.disposemymeds.org.          This list is accurate as of 5/4/18  3:15 PM.  Always use your most recent med list.                   Brand Name Dispense Instructions for use Diagnosis    allopurinol 300 MG tablet    ZYLOPRIM    30 tablet    TAKE ONE TABLET BY MOUTH ONE TIME DAILY    Gout, unspecified cause, unspecified chronicity, unspecified site       citalopram 20 MG tablet    celeXA    30 tablet    TAKE 1 TABLET BY MOUTH ONE TIME DAILY    Recurrent major depressive disorder, in partial remission (H)       cyclobenzaprine 10 MG tablet    FLEXERIL    30 tablet    Take 0.5-1 tablets (5-10 mg) by mouth 3 times daily as needed for muscle  spasms    Acute right-sided low back pain without sciatica       lisinopril 40 MG tablet    PRINIVIL/ZESTRIL    90 tablet    Take 1 tablet (40 mg) by mouth daily    Benign essential hypertension       * metoprolol succinate 100 MG 24 hr tablet    TOPROL-XL    90 tablet    TAKE 1 TABLET (100 MG) BY MOUTH DAILY    Essential hypertension       * metoprolol succinate 50 MG 24 hr tablet    TOPROL-XL    30 tablet    Take 1 tablet (50 mg) by mouth daily    Benign essential hypertension       MULTI VITAMIN MENS PO      Take 1 tablet by mouth.        traZODone 50 MG tablet    DESYREL    30 tablet    Take 1 tablet (50 mg) by mouth nightly as needed for sleep    Recurrent major depressive disorder, in partial remission (H), Persistent insomnia       * Notice:  This list has 2 medication(s) that are the same as other medications prescribed for you. Read the directions carefully, and ask your doctor or other care provider to review them with you.

## 2018-05-04 NOTE — PATIENT INSTRUCTIONS
1. Shingrex is a 2 shot series that prevents shingles 97% of the time, as opposed to the old shingles shot that only prevented it at 40-50%  It costs less for medicare at a pharmacy  You should get it starting at 50 yrs old     2.  Weight Loss Tips  1. Do not eat after 6 hrs before your expected bedtime  2. Have your heaviest meal for breakfast, a slightly lighter meal at lunch and a snack 6 hrs before bed  3. No sugar/calorie drinks except milk ie no fruit juice, pop, alcohol.  4. Drink milk 30min before meals to decrease your hunger. Also it is excellent as part of your last meal of the day snack  5. Drink lots of water  6. Increase fiber in diet: all bran cereal, salads, popcorn etc  7. Have only one small serving of fruit a day about 1/2 cup (as this is high in sugar)  8. EXERCISE is the bottom line. Without it, you will gain weight even on a low calorie diet. Best if done 2-3X a day as can    Being overweight contributes to high blood pressure and high cholesterol, both of which cause heart attacks, strokes and kidney failure, prediabetes and diabetes, arthritis, and liver disease     4. Shingrex is a 2 shot series that prevents shingles 97% of the time, as opposed to the old shingles shot that only prevented it at 40-50%  It costs less for medicare at a pharmacy  You should get it starting at 50 yrs old     5.  Eat calcium : dairy and greens  Do not take calcium in pill form as it can plaque on the heart arteries and cause kidney stones    6. Your blood pressure has been high or you are starting a new medication for it :1) increase lisinopril from 30- to 40mgm  2) increase the metoprolol from one pill of 100mgm a day  To taking one of 100mgm and one of 50mgm    Please take 2-4 blood pressures and heart rates a week and write them down with date, time of day and location and bring this record in in 3-5 weeks. The optimal blood  pressure is < 140/80 or close to this most of the time.    7. For the depression  :  A. Continue the citalopram   B.   Add in trazodone at bedtime every nite   Start with 50mgm and can go to 100mgm   This treats the insomnia, the depression and anxiety

## 2018-05-04 NOTE — NURSING NOTE
"Chief Complaint   Patient presents with     Recheck Medication     BP (!) 150/100  Pulse 68  Temp 97.8  F (36.6  C) (Tympanic)  Resp 18  Ht 6' 1.5\" (1.867 m)  Wt 327 lb (148.3 kg)  SpO2 94%  BMI 42.56 kg/m2 Estimated body mass index is 42.56 kg/(m^2) as calculated from the following:    Height as of this encounter: 6' 1.5\" (1.867 m).    Weight as of this encounter: 327 lb (148.3 kg).  BP completed using cuff size: large   Fanta Harrington CMA    Health Maintenance Due   Topic Date Due     HIV SCREEN (SYSTEM ASSIGNED)  03/05/1986     JUAN C QUESTIONNAIRE 6 MONTHS  11/19/2017     PHQ-9 Q6 MONTHS  11/19/2017     COLON CANCER SCREEN (SYSTEM ASSIGNED)  03/05/2018     DEPRESSION ACTION PLAN Q1 YR  05/19/2018     Health Maintenance reviewed at today's visit patient asked to schedule/complete:   Depression:  Patient agrees to schedule    "

## 2018-05-04 NOTE — LETTER
My Depression Action Plan  Name: Homer Wells   Date of Birth 1970  Date: 5/4/2018    My doctor: Bernard Cruz   My clinic: [address] [address]  [phone]          GREEN    ZONE   Good Control    What it looks like:     Things are going generally well. You have normal up s and down s. You may even feel depressed from time to time, but bad moods usually last less than a day.   What you need to do:  1. Continue to care for yourself (see self care plan)  2. Check your depression survival kit and update it as needed  3. Follow your physician s recommendations including any medication.  4. Do not stop taking medication unless you consult with your physician first.           YELLOW         ZONE Getting Worse    What it looks like:     Depression is starting to interfere with your life.     It may be hard to get out of bed; you may be starting to isolate yourself from others.    Symptoms of depression are starting to last most all day and this has happened for several days.     You may have suicidal thoughts but they are not constant.   What you need to do:     1. Call your care team, your response to treatment will improve if you keep your care team informed of your progress. Yellow periods are signs an adjustment may need to be made.     2. Continue your self-care, even if you have to fake it!    3. Talk to someone in your support network    4. Open up your depression survival kit           RED    ZONE Medical Alert - Get Help    What it looks like:     Depression is seriously interfering with your life.     You may experience these or other symptoms: You can t get out of bed most days, can t work or engage in other necessary activities, you have trouble taking care of basic hygiene, or basic responsibilities, thoughts of suicide or death that will not go away, self-injurious behavior.     What you need to  do:  1. Call your care team and request a same-day appointment. If they are not available (weekends or after hours) call your local crisis line, emergency room or 911.            Depression Self Care Plan / Survival Kit    Self-Care for Depression  Here s the deal. Your body and mind are really not as separate as most people think.  What you do and think affects how you feel and how you feel influences what you do and think. This means if you do things that people who feel good do, it will help you feel better.  Sometimes this is all it takes.  There is also a place for medication and therapy depending on how severe your depression is, so be sure to consult with your medical provider and/ or Behavioral Health Consultant if your symptoms are worsening or not improving.     In order to better manage my stress, I will:    Exercise  Get some form of exercise, every day. This will help reduce pain and release endorphins, the  feel good  chemicals in your brain. This is almost as good as taking antidepressants!  This is not the same as joining a gym and then never going! (they count on that by the way ) It can be as simple as just going for a walk or doing some gardening, anything that will get you moving.      Hygiene   Maintain good hygiene (Get out of bed in the morning, Make your bed, Brush your teeth, Take a shower, and Get dressed like you were going to work, even if you are unemployed).  If your clothes don't fit try to get ones that do.    Diet  I will strive to eat foods that are good for me, drink plenty of water, and avoid excessive sugar, caffeine, alcohol, and other mood-altering substances.  Some foods that are helpful in depression are: complex carbohydrates, B vitamins, flaxseed, fish or fish oil, fresh fruits and vegetables.    Psychotherapy  I agree to participate in Individual Therapy (if recommended).    Medication  If prescribed medications, I agree to take them.  Missing doses can result in serious  side effects.  I understand that drinking alcohol, or other illicit drug use, may cause potential side effects.  I will not stop my medication abruptly without first discussing it with my provider.    Staying Connected With Others  I will stay in touch with my friends, family members, and my primary care provider/team.    Use your imagination  Be creative.  We all have a creative side; it doesn t matter if it s oil painting, sand castles, or mud pies! This will also kick up the endorphins.    Witness Beauty  (AKA stop and smell the roses) Take a look outside, even in mid-winter. Notice colors, textures. Watch the squirrels and birds.     Service to others  Be of service to others.  There is always someone else in need.  By helping others we can  get out of ourselves  and remember the really important things.  This also provides opportunities for practicing all the other parts of the program.    Humor  Laugh and be silly!  Adjust your TV habits for less news and crime-drama and more comedy.    Control your stress  Try breathing deep, massage therapy, biofeedback, and meditation. Find time to relax each day.     My support system    Clinic Contact:  Phone number:    Contact 1:  Phone number:    Contact 2:  Phone number:    Jain/:  Phone number:    Therapist:  Phone number:    Local crisis center:    Phone number:    Other community support:  Phone number:

## 2018-05-04 NOTE — PROGRESS NOTES
SUBJECTIVE:   Homer Wells is a 48 year old male who presents to clinic today for the following health issues:    Hypertension Follow-up      Outpatient blood pressures are not being checked.    BP > 140/ for yrs     HR 60-80  So at least no longer tachycardia - on toprol 100mgm XL  ( & lisinopril 30mgm )    Low Salt Diet: low salt    ALCOHOL ABUSE    -off 1999 - 2012 and then on since -beer   - now intends to quit   -AA  Off& on since and will restart tonite   -has contd to meet with his AA friends   -seems determined to quit again       Depression and Anxiety Follow-Up      Status since last visit: Worsened     Med: contd oncelexa     See above re alcohol     Still on celexa     Other associated symptoms:None    Complicating factors:     Significant life event: bankruptcy      Current substance abuse: None    PHQ-9 3/21/2016 11/15/2016 5/19/2017                    5-4-18   Total Score 2 8 14                         13   Q9: Suicide Ideation Not at all Not at all Not at all     JUAN C-7 SCORE 5/19/2017   Total Score 11                                                                          9        PHQ-9  English  PHQ-9   Any Language  JUAN C-7  Suicide Assessment Five-step Evaluation and Treatment (SAFE-T)      Amount of exercise or physical activity: None    Problems taking medications regularly: No    Medication side effects: none    Diet: regular (no restrictions)    Gout  Duration: yrs   Description   Location: big toe - bilateral  Joint Swelling: YES  Redness: YES  Pain intensity:  moderate  Accompanying signs and symptoms: None  History  Previous history of gout: YES   Trauma to the area: no   Precipitating factors:   Alcohol usage: Frequent  Diuretic use: no   Recent illness: no   Therapies tried and outcome: 300mgm allopurinol   Last uric acid in 5-17n = 5.5     Insomnia      Duration: yrs     Description  Frequency of insomnia:  nightly  Time to fall asleep: 30 minutes  Middle of night awakening:   nightly  Early morning awakening:  nightlyAccompanying signs and symptoms:  depression/mood changes & alcoho abuse  & VONNIE with cPAP    History  Similar episodes in past:  YES  Previous evaluation/sleep study:  YES    Precipitating or alleviating factors:  New stressful situation: YES- trying to quit alcohol abuse  Caffeine intake after lunchtime: no   OTC decongestants: no   Any new medications: no     Therapies tried and outcome: none      MORBID OBESITY    -BMI= 42.7  -comorbid HTN, glu intol, gout     HYPOXIA     -95%   - BMI = 42.7 so abdom compresses lungs   -has VONNIE     Glucose Intolerance   Follow-up      Patient is checking blood sugars: not at all    FBS to 133    Diabetic concerns: None     Symptoms of hypoglycemia (low blood sugar): none     Paresthesias (numbness or burning in feet) or sores: No     Date of last diabetic eye exam: ?2016    BP Readings from Last 2 Encounters:   05/04/18 138/88   06/02/17 130/70     LDL Cholesterol Calculated (mg/dL)   Date Value   06/04/2015 86     LDL Cholesterol Direct (mg/dL)   Date Value   06/13/2013 126       Problem list and histories reviewed & adjusted, as indicated.  Additional history: as documented    Labs reviewed in EPIC    Reviewed and updated as needed this visit by clinical staff  Tobacco  Allergies  Meds  Med Hx  Surg Hx  Fam Hx  Soc Hx      Reviewed and updated as needed this visit by Provider         ROS:  CONSTITUTIONAL: NEGATIVE for fever, chills, change in weight  INTEGUMENTARY/SKIN: NEGATIVE for worrisome rashes, moles or lesions  EYES: NEGATIVE for vision changes or irritation  ENT/MOUTH: NEGATIVE for ear, mouth and throat problems  RESP: NEGATIVE for significant cough or SOB  BREAST: NEGATIVE for masses, tenderness or discharge  CV: NEGATIVE for chest pain, palpitations or peripheral edema  GI: NEGATIVE for nausea, abdominal pain, heartburn, or change in bowel habits  : NEGATIVE for frequency, dysuria, or hematuria  MUSCULOSKELETAL:  "NEGATIVE for significant arthralgias or myalgia  NEURO: NEGATIVE for weakness, dizziness or paresthesias  ENDOCRINE: NEGATIVE for temperature intolerance, skin/hair changes  HEME: NEGATIVE for bleeding problems  PSYCHIATRIC: POSITIVE for, alcohol abuse, anxiety, concentration difficulty, depressed mood, HX anxiety, HX depression, fatigue, insomnia , stress bankruptcy  and weight gain    OBJECTIVE:     /88  Pulse 68  Temp 97.8  F (36.6  C) (Tympanic)  Resp 18  Ht 6' 1.5\" (1.867 m)  Wt 327 lb (148.3 kg)  SpO2 94%  BMI 42.56 kg/m2  Body mass index is 42.56 kg/(m^2).  GENERAL: healthy, alert, no distress, obese, elderly and fatigued  EYES: Eyes grossly normal to inspection, PERRL and conjunctivae and sclerae normal  RESP: lungs clear to auscultation - no rales, rhonchi or wheezes  CV: regular rate and rhythm, normal S1 S2, no S3 or S4, no murmur, click or rub, no peripheral edema and peripheral pulses strong  MS: no gross musculoskeletal defects noted, no edema  SKIN: no suspicious lesions or rashes  Simon   NEURO: Normal strength and tone, mentation intact and speech normal  PSYCH: mentation appears normal, tangential, affect normal/bright, anxious, fatigued and appearance well groomed    Diagnostic Test Results:  Results for orders placed or performed in visit on 05/19/17   ALT   Result Value Ref Range    ALT 47 0 - 70 U/L   AST   Result Value Ref Range    AST 31 0 - 45 U/L   Glucose   Result Value Ref Range    Glucose 136 (H) 70 - 99 mg/dL   Uric acid   Result Value Ref Range    Uric Acid 5.7 3.5 - 7.2 mg/dL       ASSESSMENT/PLAN:               ICD-10-CM    1. Benign essential hypertension- uncontrolled on meds  I10 Basic metabolic panel     lisinopril (PRINIVIL/ZESTRIL) 40 MG tablet     metoprolol succinate (TOPROL-XL) 50 MG 24 hr tablet   2. Alcohol abuse since teens -off 1999-2012-restart-quitting 5-18 F10.10    3. Persistent insomnia-issue of starting treatment  G47.00 traZODone (DESYREL) 50 MG " tablet   4. Anxiety state F41.1    5. Impaired fasting glucose R73.01 Basic metabolic panel   6. 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 Hypoxia R09.02    8. Morbid obesity due to excess calories (H):BMI 40-50 w comorbid HTN  E66.01    9. Screening for prostate cancer Z12.5 Prostate spec antigen screen   10. Recurrent major depressive disorder, in partial remission (H)- issue of increasing treatment  F33.41 traZODone (DESYREL) 50 MG tablet   11. Family history of colon cancer Z80.0        Patient Instructions   1. Shingrex is a 2 shot series that prevents shingles 97% of the time, as opposed to the old shingles shot that only prevented it at 40-50%  It costs less for medicare at a pharmacy  You should get it starting at 50 yrs old     2.  Weight Loss Tips  1. Do not eat after 6 hrs before your expected bedtime  2. Have your heaviest meal for breakfast, a slightly lighter meal at lunch and a snack 6 hrs before bed  3. No sugar/calorie drinks except milk ie no fruit juice, pop, alcohol.  4. Drink milk 30min before meals to decrease your hunger. Also it is excellent as part of your last meal of the day snack  5. Drink lots of water  6. Increase fiber in diet: all bran cereal, salads, popcorn etc  7. Have only one small serving of fruit a day about 1/2 cup (as this is high in sugar)  8. EXERCISE is the bottom line. Without it, you will gain weight even on a low calorie diet. Best if done 2-3X a day as can    Being overweight contributes to high blood pressure and high cholesterol, both of which cause heart attacks, strokes and kidney failure, prediabetes and diabetes, arthritis, and liver disease     4. Shingrex is a 2 shot series that prevents shingles 97% of the time, as opposed to the old shingles shot that only prevented it at 40-50%  It costs less for medicare at a pharmacy  You should get it starting at 50 yrs old     5.  Eat calcium : dairy  and greens  Do not take calcium in pill form as it can plaque on the heart arteries and cause kidney stones    6. Your blood pressure has been high or you are starting a new medication for it :1) increase lisinopril from 30- to 40mgm  2) increase the metoprolol from one pill of 100mgm a day  To taking one of 100mgm and one of 50mgm    Please take 2-4 blood pressures and heart rates a week and write them down with date, time of day and location and bring this record in in 3-5 weeks. The optimal blood  pressure is < 140/80 or close to this most of the time.    7. For the depression :  A. Continue the citalopram   B.   Add in trazodone at bedtime every nite   Start with 50mgm and can go to 100mgm   This treats the insomnia, the depression and anxiety         Liliana Mattson MD  [facility]    Weight management plan: Discussed healthy diet and exercise guidelines and patient will follow up in 6 months in clinic to re-evaluate.    Lliiana Mattson MD

## 2018-05-05 LAB
ALT SERPL W P-5'-P-CCNC: 48 U/L (ref 0–70)
ANION GAP SERPL CALCULATED.3IONS-SCNC: 5 MMOL/L (ref 3–14)
AST SERPL W P-5'-P-CCNC: 33 U/L (ref 0–45)
BUN SERPL-MCNC: 18 MG/DL (ref 7–30)
CALCIUM SERPL-MCNC: 9.4 MG/DL (ref 8.5–10.1)
CHLORIDE SERPL-SCNC: 106 MMOL/L (ref 94–109)
CO2 SERPL-SCNC: 29 MMOL/L (ref 20–32)
CREAT SERPL-MCNC: 0.89 MG/DL (ref 0.66–1.25)
GFR SERPL CREATININE-BSD FRML MDRD: 90 ML/MIN/1.7M2
GGT SERPL-CCNC: 96 U/L (ref 0–75)
GLUCOSE SERPL-MCNC: 99 MG/DL (ref 70–99)
POTASSIUM SERPL-SCNC: 4 MMOL/L (ref 3.4–5.3)
PSA SERPL-ACNC: 1.98 UG/L (ref 0–4)
SODIUM SERPL-SCNC: 140 MMOL/L (ref 133–144)
URATE SERPL-MCNC: 5.5 MG/DL (ref 3.5–7.2)

## 2018-05-05 ASSESSMENT — ANXIETY QUESTIONNAIRES: GAD7 TOTAL SCORE: 9

## 2018-05-05 ASSESSMENT — PATIENT HEALTH QUESTIONNAIRE - PHQ9: SUM OF ALL RESPONSES TO PHQ QUESTIONS 1-9: 13

## 2018-05-10 RX ORDER — CITALOPRAM HYDROBROMIDE 20 MG/1
TABLET ORAL
Qty: 30 TABLET | Refills: 0 | Status: SHIPPED | OUTPATIENT
Start: 2018-05-10 | End: 2018-06-07

## 2018-05-18 NOTE — PROGRESS NOTES
Please see attached lab results  All of your lab results are normal, except as noted below.    The following are explanations of some of our lab tests    THIS DOES NOT MEAN THAT YOU HAD ALL OF THESE DONE    Please continue on the same medications unless a change is noted above    These are some general explanations for tests:    Hgb is the blood iron level  WBC means White Blood Cells  Platelets are small blood cells that help with forming the blood clots along with other blood factors.  Electrolytes are Sodium, Potassium, Calcium, Magnesium, Phosphorus.  Liver tests are: AST, ALT, Bilirubin, Alkaline Phosphatase.& GGT  Which is moderately high ####we need to rechek this ###    Kidney tests are Creatinine, GFR.  HDL Cholesterol - is the good cholesterol and it is good to have it high.  LDL cholesterol is the bad cholesterol and it is good to have it low.  It is recommended to have LDL less than 130 for people with hypertension and to have it less than 100 for people with heart disease, diabetes and chronic kidney disease.  Triglycerides are another type of lipid and can also cause heart disease so should be kept low.   Thyroid tests are TSH, T4, T3  Glucose is sugar#####It is moderately high at 136##we should also rechek this with a better study ###    A1c is a test that gives us an idea about how well was controlled the diabetes for the last 3 months.   PSA stands for Prostate Specific Antigen and it can be elevated with prostate cancer or prostate inflammation.    Uric Acid --gout ---is normal --continue the same meds

## 2018-05-31 ENCOUNTER — OFFICE VISIT (OUTPATIENT)
Dept: FAMILY MEDICINE | Facility: CLINIC | Age: 48
End: 2018-05-31
Payer: COMMERCIAL

## 2018-05-31 VITALS
WEIGHT: 315 LBS | DIASTOLIC BLOOD PRESSURE: 118 MMHG | TEMPERATURE: 97.5 F | SYSTOLIC BLOOD PRESSURE: 170 MMHG | HEIGHT: 74 IN | HEART RATE: 70 BPM | OXYGEN SATURATION: 98 % | RESPIRATION RATE: 18 BRPM | BODY MASS INDEX: 40.43 KG/M2

## 2018-05-31 DIAGNOSIS — I10 BENIGN ESSENTIAL HYPERTENSION: Primary | ICD-10-CM

## 2018-05-31 DIAGNOSIS — F41.1 ANXIETY STATE: ICD-10-CM

## 2018-05-31 DIAGNOSIS — G47.00 PERSISTENT INSOMNIA: ICD-10-CM

## 2018-05-31 DIAGNOSIS — F32.5 MAJOR DEPRESSION IN COMPLETE REMISSION (H): ICD-10-CM

## 2018-05-31 DIAGNOSIS — F10.10 ALCOHOL ABUSE: ICD-10-CM

## 2018-05-31 DIAGNOSIS — R73.01 IMPAIRED FASTING GLUCOSE: ICD-10-CM

## 2018-05-31 DIAGNOSIS — M1A.4790 OTHER SECONDARY CHRONIC GOUT OF FOOT WITHOUT TOPHUS, UNSPECIFIED LATERALITY: ICD-10-CM

## 2018-05-31 DIAGNOSIS — E66.01 MORBID OBESITY DUE TO EXCESS CALORIES (H): ICD-10-CM

## 2018-05-31 PROBLEM — F33.41 RECURRENT MAJOR DEPRESSIVE DISORDER, IN PARTIAL REMISSION (H): Status: RESOLVED | Noted: 2017-05-19 | Resolved: 2018-05-31

## 2018-05-31 PROCEDURE — 99214 OFFICE O/P EST MOD 30 MIN: CPT | Performed by: FAMILY MEDICINE

## 2018-05-31 RX ORDER — METOPROLOL SUCCINATE 50 MG/1
50 TABLET, EXTENDED RELEASE ORAL DAILY
Qty: 30 TABLET | Refills: 0 | Status: CANCELLED | OUTPATIENT
Start: 2018-05-31

## 2018-05-31 RX ORDER — METOPROLOL SUCCINATE 100 MG/1
TABLET, EXTENDED RELEASE ORAL
Qty: 90 TABLET | Refills: 1 | Status: CANCELLED | OUTPATIENT
Start: 2018-05-31

## 2018-05-31 RX ORDER — METOPROLOL TARTRATE 50 MG
50 TABLET ORAL 2 TIMES DAILY
Qty: 180 TABLET | Refills: 0 | Status: SHIPPED | OUTPATIENT
Start: 2018-05-31 | End: 2019-11-19

## 2018-05-31 RX ORDER — METOPROLOL TARTRATE 100 MG
100 TABLET ORAL 2 TIMES DAILY
Qty: 180 TABLET | Refills: 0 | Status: SHIPPED | OUTPATIENT
Start: 2018-05-31 | End: 2019-11-19

## 2018-05-31 RX ORDER — TRAZODONE HYDROCHLORIDE 50 MG/1
50 TABLET, FILM COATED ORAL
Qty: 90 TABLET | Refills: 0 | Status: SHIPPED | OUTPATIENT
Start: 2018-05-31 | End: 2019-09-12

## 2018-05-31 ASSESSMENT — ANXIETY QUESTIONNAIRES
GAD7 TOTAL SCORE: 2
3. WORRYING TOO MUCH ABOUT DIFFERENT THINGS: NOT AT ALL
2. NOT BEING ABLE TO STOP OR CONTROL WORRYING: NOT AT ALL
5. BEING SO RESTLESS THAT IT IS HARD TO SIT STILL: NOT AT ALL
7. FEELING AFRAID AS IF SOMETHING AWFUL MIGHT HAPPEN: NOT AT ALL
IF YOU CHECKED OFF ANY PROBLEMS ON THIS QUESTIONNAIRE, HOW DIFFICULT HAVE THESE PROBLEMS MADE IT FOR YOU TO DO YOUR WORK, TAKE CARE OF THINGS AT HOME, OR GET ALONG WITH OTHER PEOPLE: SOMEWHAT DIFFICULT
1. FEELING NERVOUS, ANXIOUS, OR ON EDGE: SEVERAL DAYS
6. BECOMING EASILY ANNOYED OR IRRITABLE: SEVERAL DAYS

## 2018-05-31 ASSESSMENT — PATIENT HEALTH QUESTIONNAIRE - PHQ9: 5. POOR APPETITE OR OVEREATING: NOT AT ALL

## 2018-05-31 NOTE — PROGRESS NOTES
SUBJECTIVE:   Homer Wells is a 48 year old male who presents to clinic today for the following health issues:    Hypertension Follow-up      Outpatient blood pressures are not being checked.as his arm is too big for the cuffs     BP > 140/ for yrs & is 170/118ntoday     HR 60-80  So at least no longer tachycardia - on toprol 100mgm XL -was on 150mgm till 5-25-18 when ran out of th 100mgm XL tabs  ( & lisinopril 40mgm )    Low Salt Diet: low salt    ALCOHOL ABUSE    -off 1999 - 2012 and then on since -beer   - now intends to quit   -AA  Off& on since and will restart tonite   -has contd to meet with his AA friends   -seems determined to quit again but conts to drink qod=beer   GGT was 96        Depression and Anxiety Follow-Up      Status since last visit: now controlled on continueing the celexa and adding trazodone q HS     Med: contd oncelexa     See above re alcohol     Still on celexa     Other associated symptoms:None    Complicating factors:     Significant life event: bankruptcy      Current substance abuse: None    PHQ-9 3/21/2016 11/15/2016 5/19/2017                    5-4-18    Total Score 2 8 14                         13   Q9: Suicide Ideation Not at all Not at all Not at all     JUAN C-7 SCORE 5/19/2017   Total Score 11                                                                          9        PHQ-9  English=5  PHQ-9   Any Language  JUAN C-7=2  Suicide Assessment Five-step Evaluation and Treatment (SAFE-T)      Amount of exercise or physical activity: None    Problems taking medications regularly: No    Medication side effects: none    Diet: regular (no restrictions)    Gout  Duration: yrs   Description   Location: big toe - bilateral  Joint Swelling: YES  Redness: YES  Pain intensity:  moderate  Accompanying signs and symptoms: None  History  Previous history of gout: YES   Trauma to the area: no   Precipitating factors:   Alcohol usage: Frequent  Diuretic use: no   Recent illness: no    Therapies tried and outcome: 300mgm allopurinol   Last uric acid in 5-17n = 5.5     Insomnia      Duration: yrs     Description  Frequency of insomnia:  nightly  Time to fall asleep: 30 minutes  Middle of night awakening:  nightly  Early morning awakening:  nightlyAccompanying signs and symptoms:  depression/mood changes & alcoho abuse  & VONNIE with cPAP    History  Similar episodes in past:  YES  Previous evaluation/sleep study:  YES    Precipitating or alleviating factors:  New stressful situation: YES- trying to quit alcohol abuse  Caffeine intake after lunchtime: no   OTC decongestants: no   Any new medications: no     Therapies tried and outcome: trazodone 50mgm --> sleeps well takes it at 5pm     Was drowsy in am at 1st , butnow not       MORBID OBESITY    -BMI= 42.7  -comorbid HTN, glu intol, gout     HYPOXIA     -95%   - BMI = 42.7 so abdom compresses lungs   -has VONNIE     Glucose Intolerance   Follow-up      Patient is checking blood sugars: not at all    FBS to 133    Diabetic concerns: None     Symptoms of hypoglycemia (low blood sugar): none     Paresthesias (numbness or burning in feet) or sores: No     Date of last diabetic eye exam: ?2016    BP Readings from Last 2 Encounters:   05/31/18 (!) 170/118   05/04/18 138/88     LDL Cholesterol Calculated (mg/dL)   Date Value   06/04/2015 86     LDL Cholesterol Direct (mg/dL)   Date Value   06/13/2013 126       Problem list and histories reviewed & adjusted, as indicated.  Additional history: as documented    Labs reviewed in EPIC    Reviewed and updated as needed this visit by clinical staff  Tobacco  Allergies  Meds  Problems  Med Hx  Surg Hx  Fam Hx  Soc Hx        Reviewed and updated as needed this visit by Provider  Allergies  Meds  Problems         ROS:  CONSTITUTIONAL: NEGATIVE for fever, chills, change in weight  INTEGUMENTARY/SKIN: NEGATIVE for worrisome rashes, moles or lesions  EYES: NEGATIVE for vision changes or irritation  ENT/MOUTH:  "NEGATIVE for ear, mouth and throat problems  RESP: NEGATIVE for significant cough or SOB  BREAST: NEGATIVE for masses, tenderness or discharge  CV: NEGATIVE for chest pain, palpitations or peripheral edema  GI: NEGATIVE for nausea, abdominal pain, heartburn, or change in bowel habits  : NEGATIVE for frequency, dysuria, or hematuria  MUSCULOSKELETAL: NEGATIVE for significant arthralgias or myalgia  NEURO: NEGATIVE for weakness, dizziness or paresthesias  ENDOCRINE: NEGATIVE for temperature intolerance, skin/hair changes  HEME: NEGATIVE for bleeding problems  PSYCHIATRIC: POSITIVE for, alcohol abuse, anxiety, concentration difficulty, depressed mood, HX anxiety, HX depression, fatigue, insomnia , stress bankruptcy  and weight gain    OBJECTIVE:     BP (!) 170/118  Pulse 70  Temp 97.5  F (36.4  C) (Tympanic)  Resp 18  Ht 6' 1.5\" (1.867 m)  Wt 327 lb (148.3 kg)  SpO2 98%  BMI 42.56 kg/m2  Body mass index is 42.56 kg/(m^2).  GENERAL: healthy, alert, no distress, obese, elderly and fatigued  EYES: Eyes grossly normal to inspection, PERRL and conjunctivae and sclerae normal  RESP: lungs clear to auscultation - no rales, rhonchi or wheezes  CV: regular rate and rhythm, normal S1 S2, no S3 or S4, no murmur, click or rub, no peripheral edema and peripheral pulses strong  MS: no gross musculoskeletal defects noted, no edema  SKIN: no suspicious lesions or rashes  Simon   NEURO: Normal strength and tone, mentation intact and speech normal  PSYCH: mentation appears normal, tangential, affect normal/bright, anxious, fatigued and appearance well groomed    Diagnostic Test Results:  Results for orders placed or performed in visit on 05/04/18   Prostate spec antigen screen   Result Value Ref Range    PSA 1.98 0 - 4 ug/L   Basic metabolic panel   Result Value Ref Range    Sodium 140 133 - 144 mmol/L    Potassium 4.0 3.4 - 5.3 mmol/L    Chloride 106 94 - 109 mmol/L    Carbon Dioxide 29 20 - 32 mmol/L    Anion Gap 5 3 - 14 " mmol/L    Glucose 99 70 - 99 mg/dL    Urea Nitrogen 18 7 - 30 mg/dL    Creatinine 0.89 0.66 - 1.25 mg/dL    GFR Estimate 90 >60 mL/min/1.7m2    GFR Estimate If Black >90 >60 mL/min/1.7m2    Calcium 9.4 8.5 - 10.1 mg/dL   Uric acid   Result Value Ref Range    Uric Acid 5.5 3.5 - 7.2 mg/dL   ALT   Result Value Ref Range    ALT 48 0 - 70 U/L   AST   Result Value Ref Range    AST 33 0 - 45 U/L   GGT   Result Value Ref Range    GGT 96 (H) 0 - 75 U/L       ASSESSMENT/PLAN:         ICD-10-CM    1. Benign essential hypertension- uncontrolled on -meds--issu of increase inmeds  I10 metoprolol tartrate (LOPRESSOR) 100 MG tablet     metoprolol tartrate (LOPRESSOR) 50 MG tablet   2. Persistent insomnia-resolved on  treatment G47.00 traZODone (DESYREL) 50 MG tablet   3. Major depression in complete remission (H) F32.5    4. Anxiety state F41.1    5. 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 Alcohol abuse since teens -off 1999-2012-restart  F10.10    7. Impaired fasting glucose R73.01    8. Morbid obesity due to excess calories (H):BMI 40-50 w comorbid HTN  E66.01                Patient Instructions   1. To decrease your blood pressure:     1) decrease your salt in your diet   2) increase greens   3) decrease red meat   4) increase fiber in diet   5) increase exercise   6) lose weight (if you are over weight )       2. Shingrex is a 2 shot series that prevents shingles 97% of the time, as opposed to the old shingles shot that only prevented it at 40-50%  It costs less for medicare at a pharmacy  You should get it starting at 50 yrs old     3.  Weight Loss Tips  1. Do not eat after 6 hrs before your expected bedtime  2. Have your heaviest meal for breakfast, a slightly lighter meal at lunch and a snack 6 hrs before bed  3. No sugar/calorie drinks except milk ie no fruit juice, pop, alcohol.  4. Drink milk 30min before meals to decrease your hunger. Also it is excellent  as part of your last meal of the day snack  5. Drink lots of water  6. Increase fiber in diet: all bran cereal, salads, popcorn etc  7. Have only one small serving of fruit a day about 1/2 cup (as this is high in sugar)  8. EXERCISE is the bottom line. Without it, you will gain weight even on a low calorie diet. Best if done 2-3X a day as can    Being overweight contributes to high blood pressure and high cholesterol, both of which cause heart attacks, strokes and kidney failure, prediabetes and diabetes, arthritis, and liver disease     4. You need a large cuff for the BP monitor     5. We will increase the metoprolol from 150mgm XL a day to 150(100+50mgm)  mgm 2 x a day  If you see the BP is still high in 2 weeks, then go to 200mgm at nite  ( 2 of the 100mgm tabs )     Continue the lisinopril   40mgm     Your blood pressure has been high or you are starting a new medication for it :   Please take 2-4 blood pressures and heart rates a week and write them down with date, time of day and location and bring this record in in 3-5 weeks. The optimal blood  pressure is < 140/80 or close to this most of the time.    Call in 2-3 BPs in 3-4 weeks         Liliana Mattson MD  [facility]    Weight management plan: Discussed healthy diet and exercise guidelines and patient will follow up in 6 months in clinic to re-evaluate.          Amount of exercise or physical activity: None    Problems taking medications regularly: No    Medication side effects: none    Diet: regular (no restrictions)        Problem list and histories reviewed & adjusted, as indicated.  Additional history: as documented    Labs reviewed in EPIC    Reviewed and updated as needed this visit by clinical staff  Tobacco  Allergies  Meds  Problems  Med Hx  Surg Hx  Fam Hx  Soc Hx        Reviewed and updated as needed this visit by Provider  Allergies  Meds  Problems               Weight management plan: Discussed healthy  diet and exercise guidelines and patient will follow up in 6 months in clinic to re-evaluate.      Liliana Mattson MD

## 2018-05-31 NOTE — NURSING NOTE
"Chief Complaint   Patient presents with     Recheck Medication     BP (!) 160/120  Pulse 70  Temp 97.5  F (36.4  C) (Tympanic)  Resp 18  Ht 6' 1.5\" (1.867 m)  Wt 327 lb (148.3 kg)  SpO2 98%  BMI 42.56 kg/m2 Estimated body mass index is 42.56 kg/(m^2) as calculated from the following:    Height as of this encounter: 6' 1.5\" (1.867 m).    Weight as of this encounter: 327 lb (148.3 kg).  BP completed using cuff size: large   Fanta Harrington CMA    Health Maintenance Due   Topic Date Due     HIV SCREEN (SYSTEM ASSIGNED)  03/05/1986     COLON CANCER SCREEN (SYSTEM ASSIGNED)  03/05/2018     Health Maintenance reviewed at today's visit patient asked to schedule/complete:   None, Health Maintenance up to date.  Colon Cancer:  Patient agrees to schedule    "

## 2018-05-31 NOTE — PATIENT INSTRUCTIONS
1. To decrease your blood pressure:     1) decrease your salt in your diet   2) increase greens   3) decrease red meat   4) increase fiber in diet   5) increase exercise   6) lose weight (if you are over weight )       2. Shingrex is a 2 shot series that prevents shingles 97% of the time, as opposed to the old shingles shot that only prevented it at 40-50%  It costs less for medicare at a pharmacy  You should get it starting at 50 yrs old     3.  Weight Loss Tips  1. Do not eat after 6 hrs before your expected bedtime  2. Have your heaviest meal for breakfast, a slightly lighter meal at lunch and a snack 6 hrs before bed  3. No sugar/calorie drinks except milk ie no fruit juice, pop, alcohol.  4. Drink milk 30min before meals to decrease your hunger. Also it is excellent as part of your last meal of the day snack  5. Drink lots of water  6. Increase fiber in diet: all bran cereal, salads, popcorn etc  7. Have only one small serving of fruit a day about 1/2 cup (as this is high in sugar)  8. EXERCISE is the bottom line. Without it, you will gain weight even on a low calorie diet. Best if done 2-3X a day as can    Being overweight contributes to high blood pressure and high cholesterol, both of which cause heart attacks, strokes and kidney failure, prediabetes and diabetes, arthritis, and liver disease     4. You need a large cuff for the BP monitor     5. We will increase the metoprolol from 150mgm XL a day to 150(100+50mgm)  mgm 2 x a day  If you see the BP is still high in 2 weeks, then go to 200mgm at nite  ( 2 of the 100mgm tabs )     Continue the lisinopril   40mgm     Your blood pressure has been high or you are starting a new medication for it :   Please take 2-4 blood pressures and heart rates a week and write them down with date, time of day and location and bring this record in in 3-5 weeks. The optimal blood  pressure is < 140/80 or close to this most of the time.    Call in 2-3 BPs in 3-4 weeks

## 2018-05-31 NOTE — MR AVS SNAPSHOT
After Visit Summary   5/31/2018    Homer Wells    MRN: [id number]           Patient Information     Date Of Birth          1970        Visit Information        Provider Department      5/31/2018 2:40 PM Liliana Mattson MD [facility]        Today's Diagnoses     Benign essential hypertension- uncontrolled on meds     -  1    Persistent insomnia-resolved on  treatment        Major depression in complete remission (H)          Care Instructions    1. To decrease your blood pressure:     1) decrease your salt in your diet   2) increase greens   3) decrease red meat   4) increase fiber in diet   5) increase exercise   6) lose weight (if you are over weight )       2. Shingrex is a 2 shot series that prevents shingles 97% of the time, as opposed to the old shingles shot that only prevented it at 40-50%  It costs less for medicare at a pharmacy  You should get it starting at 50 yrs old     3.  Weight Loss Tips  1. Do not eat after 6 hrs before your expected bedtime  2. Have your heaviest meal for breakfast, a slightly lighter meal at lunch and a snack 6 hrs before bed  3. No sugar/calorie drinks except milk ie no fruit juice, pop, alcohol.  4. Drink milk 30min before meals to decrease your hunger. Also it is excellent as part of your last meal of the day snack  5. Drink lots of water  6. Increase fiber in diet: all bran cereal, salads, popcorn etc  7. Have only one small serving of fruit a day about 1/2 cup (as this is high in sugar)  8. EXERCISE is the bottom line. Without it, you will gain weight even on a low calorie diet. Best if done 2-3X a day as can    Being overweight contributes to high blood pressure and high cholesterol, both of which cause heart attacks, strokes and kidney failure, prediabetes and diabetes, arthritis, and liver disease     4. You need a large cuff for the BP monitor     5. We will increase the metoprolol from 150mgm XL a day to  "150(100+50mgm)  mgm 2 x a day  If you see the BP is still high in 2 weeks, then go to 200mgm at nite  ( 2 of the 100mgm tabs )     Continue the lisinopril   40mgm     Your blood pressure has been high or you are starting a new medication for it :   Please take 2-4 blood pressures and heart rates a week and write them down with date, time of day and location and bring this record in in 3-5 weeks. The optimal blood  pressure is < 140/80 or close to this most of the time.    Call in 2-3 BPs in 3-4 weeks             Follow-ups after your visit        Who to contact     If you have questions or need follow up information about today's clinic visit or your schedule please contact [facility] directly at [phone].  Normal or non-critical lab and imaging results will be communicated to you by MyChart, letter or phone within 4 business days after the clinic has received the results. If you do not hear from us within 7 days, please contact the clinic through [employer] or phone. If you have a critical or abnormal lab result, we will notify you by phone as soon as possible.  Submit refill requests through [employer] or call your pharmacy and they will forward the refill request to us. Please allow 3 business days for your refill to be completed.          Additional Information About Your Visit        [employer] Information     [employer] lets you send messages to your doctor, view your test results, renew your prescriptions, schedule appointments and more. To sign up, go to [email or web address] . Click on \"Log in\" on the left side of the screen, which will take you to the Welcome page. Then click on \"Sign up Now\" on the right side of the page.     You will be asked to enter the access code listed below, as well as some personal information. Please follow the directions to create your username and password.     Your access code is: [email or web address]  Expires: 2018  3:15 PM     Your access code will  " "in 90 days. If you need help or a new code, please call your [place] clinic or [phone].        Care EveryWhere ID     This is your Care EveryWhere ID. This could be used by other organizations to access your [place] medical records  [id number]        Your Vitals Were     Pulse Temperature Respirations Height Pulse Oximetry BMI (Body Mass Index)    70 97.5  F (36.4  C) (Tympanic) 18 6' 1.5\" (1.867 m) 98% 42.56 kg/m2       Blood Pressure from Last 3 Encounters:   05/31/18 (!) 170/118   05/04/18 138/88   06/02/17 130/70    Weight from Last 3 Encounters:   05/31/18 327 lb (148.3 kg)   05/04/18 327 lb (148.3 kg)   06/02/17 (!) 319 lb (144.7 kg)              Today, you had the following     No orders found for display         Today's Medication Changes          These changes are accurate as of 5/31/18  3:07 PM.  If you have any questions, ask your nurse or doctor.               Start taking these medicines.        Dose/Directions    * metoprolol tartrate 100 MG tablet   Commonly known as:  LOPRESSOR   Used for:  Benign essential hypertension   Started by:  Liliana Mattson MD        Dose:  100 mg   Take 1 tablet (100 mg) by mouth 2 times daily   Quantity:  180 tablet   Refills:  0       * metoprolol tartrate 50 MG tablet   Commonly known as:  LOPRESSOR   Used for:  Benign essential hypertension   Started by:  Liliana Mattson MD        Dose:  50 mg   Take 1 tablet (50 mg) by mouth 2 times daily   Quantity:  180 tablet   Refills:  0       * Notice:  This list has 2 medication(s) that are the same as other medications prescribed for you. Read the directions carefully, and ask your doctor or other care provider to review them with you.      Stop taking these medicines if you haven't already. Please contact your care team if you have questions.     metoprolol succinate 100 MG 24 hr tablet   Commonly known as:  TOPROL-XL   Stopped by:  Liliana Mattson MD           metoprolol " succinate 50 MG 24 hr tablet   Commonly known as:  TOPROL-XL   Stopped by:  Liliana Mattson MD                Where to get your medicines      These medications were sent to [id number] - [place], [place] - [address]  [address] [place] [address]     Phone:  [phone]     metoprolol tartrate 100 MG tablet    metoprolol tartrate 50 MG tablet    traZODone 50 MG tablet                Primary Care Provider Office Phone # Fax #    Bernard Homer Cruz [phone] [address] [facility] [address]        Equal Access to Services     [facility]: Hadii aad ku hadasho Soomaali, waaxda luqadaha, qaybta kaalmada adeegyada, waxay chichoin denisn nate mendoza . So [facility] [phone].    ATENCIÓN: Si habla español, tiene a torres disposición servicios gratuitos de asistencia lingüística. [facility] [phone].    We comply with applicable federal civil rights laws and [place] laws. We do not discriminate on the basis of race, color, national origin, age, disability, sex, sexual orientation, or gender identity.            Thank you!     Thank you for choosing [facility]  for your care. Our goal is always to provide you with excellent care. Hearing back from our patients is one way we can continue to improve our services. Please take a few minutes to complete the written survey that you may receive in the mail after your visit with us. Thank you!             Your Updated Medication List - Protect others around you: Learn how to safely use, store and throw away your medicines at www.disposemymeds.org.          This list is accurate as of 5/31/18  3:07 PM.  Always use your most recent med list.                   Brand Name Dispense Instructions for use Diagnosis    allopurinol 300 MG tablet    ZYLOPRIM    30 tablet    TAKE ONE TABLET BY MOUTH ONE TIME DAILY    Gout, unspecified cause, unspecified chronicity, unspecified site        citalopram 20 MG tablet    celeXA    30 tablet    TAKE 1 TABLET BY MOUTH ONE TIME DAILY    Recurrent major depressive disorder, in partial remission (H)       lisinopril 40 MG tablet    PRINIVIL/ZESTRIL    90 tablet    Take 1 tablet (40 mg) by mouth daily    Benign essential hypertension       * metoprolol tartrate 100 MG tablet    LOPRESSOR    180 tablet    Take 1 tablet (100 mg) by mouth 2 times daily    Benign essential hypertension       * metoprolol tartrate 50 MG tablet    LOPRESSOR    180 tablet    Take 1 tablet (50 mg) by mouth 2 times daily    Benign essential hypertension       MULTI VITAMIN MENS PO      Take 1 tablet by mouth.        traZODone 50 MG tablet    DESYREL    90 tablet    Take 1 tablet (50 mg) by mouth nightly as needed for sleep    Persistent insomnia       * Notice:  This list has 2 medication(s) that are the same as other medications prescribed for you. Read the directions carefully, and ask your doctor or other care provider to review them with you.

## 2018-06-01 ASSESSMENT — PATIENT HEALTH QUESTIONNAIRE - PHQ9: SUM OF ALL RESPONSES TO PHQ QUESTIONS 1-9: 5

## 2018-06-01 ASSESSMENT — ANXIETY QUESTIONNAIRES: GAD7 TOTAL SCORE: 2

## 2018-07-03 ENCOUNTER — TELEPHONE (OUTPATIENT)
Dept: FAMILY MEDICINE | Facility: CLINIC | Age: 48
End: 2018-07-03

## 2018-07-03 NOTE — LETTER
July 3, 2018    Homer Wells  [address] [place] [address]    Dear Roni Coronel cares about your health and your health plan.  I have reviewed your medical conditions, medication list and lab results, and am making recommendations based on this review to better manage your health.    You are in particular need of attention regarding:  -Depression/Anxiety    I am recommending that you:     Please complete the enclosed PHQ9 and mail back to clinic in the envelope provided.         Please call us at the [facility] location:  [phone] or use [employer] to address the above recommendations.     Thank you for trusting [facility]  We appreciate the opportunity to serve you and look forward to supporting your healthcare in the future.    If you have (or plan to have) any of these tests done at a facility other than a [facility] or a [facility], please have the results sent to the [facility] location noted above.      Best Regards,    Liliana Mattson MD

## 2018-07-03 NOTE — TELEPHONE ENCOUNTER
Panel Management Review      Patient has the following on his problem list:     Depression / Dysthymia review    Measure:  Needs PHQ-9 score of 4 or less during index window.  Administer PHQ-9 and if score is 5 or more, send encounter to provider for next steps.    5 - 7 month window range:     PHQ-9 SCORE 5/19/2017 5/4/2018 5/31/2018   Total Score - - -   Total Score 14 13 5       If PHQ-9 recheck is 5 or more, route to provider for next steps.    Patient is due for:  PHQ9      Composite cancer screening  Chart review shows that this patient is due/due soon for the following None  Summary:    Patient is due/failing the following:   PHQ9    Action needed:   Patient needs to do PHQ9.    Type of outreach:    Sent letter.    Questions for provider review:    None                                                                                                                                    Fanta Harrington CMA       Chart routed to NA .

## 2018-07-18 DIAGNOSIS — F33.41 RECURRENT MAJOR DEPRESSIVE DISORDER, IN PARTIAL REMISSION (H): ICD-10-CM

## 2018-07-18 NOTE — TELEPHONE ENCOUNTER
"Requested Prescriptions   Pending Prescriptions Disp Refills     citalopram (CELEXA) 20 MG tablet [Pharmacy Med Name: Citalopram Hydrobromide Oral Tablet 20 MG]  Last Written Prescription Date:  6/8/2018  Last Fill Quantity: 30,  # refills: 0   Last office visit: 5/31/2018 with prescribing provider:  Dr DANIELA Mattson  PHQ-9 SCORE 5/19/2017 5/4/2018 5/31/2018   Total Score - - -   Total Score 14 13 5     JUAN C-7 SCORE 5/19/2017 5/4/2018 5/31/2018   Total Score 11 9 2          Future Office Visit:     30 tablet 0     Sig: TAKE ONE TABLET BY MOUTH ONE TIME DAILY    SSRIs Protocol Failed    7/18/2018  8:52 AM       Failed - PHQ-9 score less than 5 in past 6 months    Please review last PHQ-9 score.          Passed - Patient is age 18 or older       Passed - Recent (6 mo) or future (30 days) visit within the authorizing provider's specialty    Patient had office visit in the last 6 months or has a visit in the next 30 days with authorizing provider or within the authorizing provider's specialty.  See \"Patient Info\" tab in inbasket, or \"Choose Columns\" in Meds & Orders section of the refill encounter.              "

## 2018-07-19 RX ORDER — CITALOPRAM HYDROBROMIDE 20 MG/1
TABLET ORAL
Qty: 30 TABLET | Refills: 0 | Status: SHIPPED | OUTPATIENT
Start: 2018-07-19 | End: 2019-11-19

## 2018-07-21 DIAGNOSIS — M10.9 GOUT, UNSPECIFIED CAUSE, UNSPECIFIED CHRONICITY, UNSPECIFIED SITE: ICD-10-CM

## 2018-07-23 NOTE — TELEPHONE ENCOUNTER
"Requested Prescriptions   Pending Prescriptions Disp Refills     allopurinol (ZYLOPRIM) 300 MG tablet [Pharmacy Med Name: Allopurinol Oral Tablet 300 MG]  Last Written Prescription Date:  3/6/2018  Last Fill Quantity: 30 tablet,  # refills: 0   Last Office Visit  5/31/2018        with  [facility], [facility] or [facility] prescribing provider:     Future Office Visit:    30 tablet 0     Sig: TAKE ONE TABLET BY MOUTH ONE TIME DAILY    Gout Agents Protocol Failed    7/21/2018 10:24 AM       Failed - CBC on file in past 12 months    Recent Labs   Lab Test  11/15/16   1501   WBC  6.1   RBC  5.26   HGB  14.8   HCT  45.5   PLT  188       For [facility] ONLY: [id number] = WBC, [id number] = RBC         Passed - ALT on file in past 12 months    Recent Labs   Lab Test  05/04/18   1515   ALT  48            Passed - Has Uric Acid on file in past 12 months and value is less than 6    Recent Labs   Lab Test  05/04/18   1514   URIC  5.5     If level is 6mg/dL or greater, ok to refill one time and refer to provider.          Passed - Recent (12 mo) or future (30 days) visit within the authorizing provider's specialty    Patient had office visit in the last 12 months or has a visit in the next 30 days with authorizing provider or within the authorizing provider's specialty.  See \"Patient Info\" tab in inbasket, or \"Choose Columns\" in Meds & Orders section of the refill encounter.           Passed - Patient is age 18 or older       Passed - Normal serum creatinine on file in the past 12 months    Recent Labs   Lab Test  05/04/18   1514   CR  0.89               "

## 2018-07-24 RX ORDER — ALLOPURINOL 300 MG/1
TABLET ORAL
Qty: 30 TABLET | Refills: 6 | Status: SHIPPED | OUTPATIENT
Start: 2018-07-24 | End: 2019-02-24

## 2018-08-07 ENCOUNTER — HOSPITAL ENCOUNTER (OUTPATIENT)
Dept: BEHAVIORAL HEALTH | Facility: CLINIC | Age: 48
Discharge: HOME | End: 2018-08-07
Attending: SOCIAL WORKER | Admitting: SOCIAL WORKER
Payer: COMMERCIAL

## 2018-08-07 VITALS — HEIGHT: 73 IN | WEIGHT: 300 LBS | BODY MASS INDEX: 39.76 KG/M2

## 2018-08-07 PROCEDURE — H0001 ALCOHOL AND/OR DRUG ASSESS: HCPCS

## 2018-08-07 ASSESSMENT — ANXIETY QUESTIONNAIRES
5. BEING SO RESTLESS THAT IT IS HARD TO SIT STILL: NOT AT ALL
7. FEELING AFRAID AS IF SOMETHING AWFUL MIGHT HAPPEN: SEVERAL DAYS
GAD7 TOTAL SCORE: 12
4. TROUBLE RELAXING: MORE THAN HALF THE DAYS
6. BECOMING EASILY ANNOYED OR IRRITABLE: MORE THAN HALF THE DAYS
2. NOT BEING ABLE TO STOP OR CONTROL WORRYING: MORE THAN HALF THE DAYS
1. FEELING NERVOUS, ANXIOUS, OR ON EDGE: NEARLY EVERY DAY
3. WORRYING TOO MUCH ABOUT DIFFERENT THINGS: MORE THAN HALF THE DAYS

## 2018-08-07 ASSESSMENT — PAIN SCALES - GENERAL: PAINLEVEL: NO PAIN (0)

## 2018-08-07 NOTE — PROGRESS NOTES
[facility]  [address] [place] [id number]      ADULT CD ASSESSMENT ADDENDUM      Patient Name: Homer Wells  Cell Phone:   Home: [phone] (home) [phone] (work)   Mobile:   Telephone Information:   Mobile [phone]       Email:  [email or web address] ([email or web address])  Emergency Contact: Dick Grey   Tel: [phone]    ________________________________________________________________________      The patient reported being:  Single, no serious involvement    With which race do you identify? White    Initial Screening Questions     1. Are you currently having severe withdrawal symptoms that are putting yourself or others in danger?  No    2. Are you currently having severe medical problems that require immediate attention?  No    3. Are you currently having severe emotional or behavioral problems that are putting yourself or others at risk of harm?  No    4. Do you have sufficient reading skills that will enable you to understand written materials, including the program rules and client rights materials?  Yes    Family History   Mother    Father   Living   No Step-mother   NA No Step-father   NA   Maternal Grandmother   NA Fraternal Grandmother NA   Maternal Grandfather    NA Fraternal   Grandfather NA   2 Sister(s)   Living and  No Brother(s)   NA   No Half-sister(s)   NA No Half-brother(s)   NA             Who raised you? (parents, grandparents, adoptive parents, step-parents, etc.)    Both Parents    Have any of your family members or significant others had problems with mental illness or substance abuse?  Please explain.    Mother.    Do you have any children or Stepchildren? No    Are you being investigated by Child Protection Services? NA    Do you have a child protection worker, probation office or ? Yes, please explain: [facility]    How would you describe your current finances?  In serious debt    If you are having problems, (unpaid bills,  bankruptcy, IRS problems) please explain:  Yes, please explain: filed Chapter 13 bankruptcy    If working or a student are you able to function appropriately in that setting? Yes    Describe your preferred learning style:  by hands-on practice    What personal strengths do you have that can help you get sober?  Desire.    Do you currently self-administer your medications?  Yes    Have you ever had to lie to people important to you about how much you strickland?     No   Have you ever felt the need to bet more and more money?     Yes, explain: that is why I had to file bankruptcy.  I would go to the bar and play pull tabs, so I wouldn't have that problem if I wasn't drink.   Have you ever attempted treatment for a gambling problem?     No   Have you ever touched or fondled someone else inappropriately or forced them to have sex with you against their will?     No   Are you or have you ever been a registered sex offender?     No   Is there any history of sexual abuse in your family?     No   Have you ever felt obsessed by your sexual behavior, such as having sex with many partners, masturbating often, using pornography often?     No     Have you ever received therapy or stayed in the hospital for mental health problems?     No     Have you ever hurt yourself, such as cutting, burning or hitting yourself?     No     Have you ever purged, binged or restricted yourself as a way to control your weight?     Yes, explain: I overeat and when I was in AA I did go to OA     Are you on a special diet?     No     Do you have any concerns regarding your nutritional status?     No     Have you had any appetite changes in the last 3 months?     No   Have you had weight loss or weight gain of more than 10 lbs in the last 3 months?   If patient gained or lost more than 10 lbs, then refer to program RN / attending Physician for assessment.     Yes, explain: since I got arrested I lost 10lbs (3 days in longterm)   Was the patient informed of  BMI?         No   Have you engaged in any risk-taking behavior that would put you at risk for exposure to blood-borne or sexually transmitted diseases?     No   Do you have any dental problems?     No   Have you ever lived through any trauma or stressful life events?     Yes, explain: last year my mom and sister  within 3-4 months of eachother   In the past month, have you had any of the following symptoms related to the trauma listed above? (dreams, intense memories, flashbacks, physical reactions, etc.)     No   Have you ever believed people were spying on you, or that someone was plotting against you or trying to hurt you?     No   Have you ever believed someone was reading your mind or could hear your thoughts or that you could actually read someone's mind or hear what another person was thinking?     No   Have you ever believed that someone of some force outside of yourself was putting thoughts into your mind or made you act in a way that was not your usual self?  Have you ever though you were possessed?     No   Have you ever believed you were being sent special messages through the TV, radio or newspaper?     No   Have you ever heard things other people couldn't hear, such as voices or other noises?     No   Have you ever had visions when you were awake?  Or have you ever seen things other people couldn't see?     No   Do you have a valid 's license?       No, explain: DWI                     [place]-Suicide Severity Rating Scale   Suicide Ideation   1.) Have you ever wished you were dead or that you could go to sleep and not wake up?     Lifetime:  Yes Past Month:  Yes   2.) Have you actually had any thoughts of killing yourself?   Lifetime:  No Past Month:  No   3.) Have you been thinking about how you might do this?     Lifetime:  No Past Month:  No   4.) Have you had these thoughts and had some intention of acting on them?     Lifetime:  No Past Month:  No   5.) Have you started to work out the  details of how to kill yourself?   Lifetime:  No Past Month:  No   6.) Do you intend to carry out this plan?      Lifetime:  No Past Month:  No   Intensity of Ideation   Intensity of ideation (1 being least severe, 5 being most severe):     Lifetime:  NA Past Month:  1   How often do you have these thoughts?  Daily or on almost daily basis      When you have the thoughts how long do they last?  Fleeting - few seconds or minutes   Can you stop thinking about killing yourself or wanting to die if you want to?  Yes, easily able to control thoughts   Are there things - anyone or anything (i.e. family, Cheondoism, pain of death) that stopped you from wanting to die or acting on thoughts of suicide?  Does not apply   What sort of reasons did you have for thinking about wanting to die or killing yourself (ie end pain, stop how you were feeling, get attention or reaction, revenge)?  Does not apply   Suicidal Behavior   (Suicide Attempt) - Have you made a suicide attempt?     Lifetime:  No Past Month:  No   Have you engaged in self-harm (non-suicidal self-injury)?  No   (Interrupted Attempt) - Has there been a time when you started to do something to end your life but someone or something stopped you before you actually did anything?  No   (Aborted or Self-Interrupted Attempt) - Has there been a time when you started to do something to try to end your life but you stopped yourself before you actually did anything?  No   (Preparatory Acts of Behavior) - Have you taken any steps towards making suicide attempt or preparing to kill yourself (such as collecting pills, getting a gun, giving valuables away or writing a suicide note)?  No   Actual Lethality/Medical Damage:  NA     2008  [employer]  Used with permission by Shelly Bishop, PhD.       Guide to C-SSRS Risk Ratings   NO IDEATION:  with no active thoughts IDEATION: with a wish to die. IDEATION: with active thoughts. Risk Ratings   If Yes  "No No 0 - Very Low Risk   If NA Yes No 1 - Low Risk   If NA Yes Yes 2 - Low/moderate risk   IDEATION: associated thoughts of methods without intent or plan INTENT: Intent to follow through on suicide PLAN: Plan to follow through on suicide Risk Ratings cont...   If Yes No No 3 - Moderate Risk   If Yes Yes No 4 - High Risk   If Yes Yes Yes 5 - High Risk   The patient's ADDITIONAL RISK FACTORS and lack of PROTECTIVE FACTORS may increase their overall suicide risk ratings.     Additional Risk Factors:    Significant history of having untreated or poorly treated mental health symptoms     Tendency to be socially isolated and/or cut off from the support of others     A recent death of someone close to the patient and/or unresolved grief and loss issues     Alcohol use   Protective Factors:    Having easy access to supportive family members     Risk Status   1. - Low Risk: Evaluation Counselors:  Document in Epic / SBAR to counselor \"Low Risk\".      Treatment Counselors:  Reassess upon admission as applicable, assess weekly in progress notes under Dimension 3 and summarize in Discharge / Treatment summary under Dimension 3.   Additional information to support suicide risk rating: No thoughts of killing self or thoughts of method or plan.     Mental Health Status   Physical Appearance/Attire: Appears stated age and Attire appropriate to age/situation   Hygiene: well groomed   Eye Contact: at examiner   Speech Rate:  regular   Speech Volume: regular   Speech Quality: fluid   Cognitive/Perceptual:  reality based   Cognition: memory intact    Judgment: intact   Insight: intact   Orientation:  time, place, person and situation   Thought::   logical    Hallucinations:  none   General Behavioral Tone: cooperative   Psychomotor Activity: no problem noted   Gait:  no problem   Mood: appropriate   Affect: congruence/appropriate   Counselor Notes: NA       Criteria for Diagnosis: DSM-5 Criteria for Substance Use Disorders  "     Alcohol Use Disorder Severe - 303.90 (F10.20)  Tobacco Use Disorder Moderate - 305.10 (F17.200)      Level of Care   I.) Intoxication and Withdrawal: 0   II.) Biomedical:  1   III.) Emotional and Behavioral:  2   IV.) Readiness to Change:  1   V.) Relapse Potential: 3   VI.) Recovery Environmental: 3       Initial Problem List     The patient has poor coping skills  The patient lacks a sober peer support network    Patient/Client is willing to follow treatment recommendations.  Yes    Counselor: Nori Rubi [facility]      Vulnerable Adult Checklist for OUTPATIENTS     1.  Do you have a physical, emotional or mental infirmity or dysfunction?       No    2.  Does this issue impair your ability to provide for your own care without help, including providing yourself with food, shelter, clothing, healthcare or supervision?       No    3.  Because of this issue, I need assistance to protect myself from maltreatment by others.      No    Based on the above information:    This person is not a functional Vulnerable Adult according to [place] Statute 626.5572 subdivision 21.

## 2018-08-08 ASSESSMENT — PATIENT HEALTH QUESTIONNAIRE - PHQ9: SUM OF ALL RESPONSES TO PHQ QUESTIONS 1-9: 11

## 2018-08-08 ASSESSMENT — ANXIETY QUESTIONNAIRES: GAD7 TOTAL SCORE: 12

## 2018-08-08 NOTE — PROGRESS NOTES
Visit Date:   08/07/2018      CHEMICAL DEPENDENCY ASSESSMENT      EVALUATION COUNSELOR:  Nori Rubi [facility].   CLIENT'S ADDRESS:  [address] [place] [address].   TELEPHONE:  [phone].   STATISTICS:  YOB: 1970.  Age:  48.  Sex:  Male.  Marital Status:  Single.   INSURANCE:  [employer].   REFERRAL SOURCE:  Self.      REASON FOR EVALUATION:  Homer Wells reports he had been sober for nearly 14 years, started drinking again about 16 years ago, and his drinking has escalated to the point at this time where he is experiencing significant consequences and most recently received a DWI.  At this time, he is wanting to come in and set up support to help reestablish his sobriety.      HEALTH HISTORY AND MEDICATIONS:  Client reports gout and high blood pressure.  He does have a primary care provider through [facility] [place].      CURRENT MEDICATIONS:  Zyloprim, citalopram, lisinopril, metoprolol and trazodone.      HISTORY OF PREVIOUS TREATMENT AND COUNSELING:  Client denies any history of detox admissions.  He was hospitalized in 2013 where he had been drinking, fell down and broke his shoulder.  He denies any other hospitalizations, drug or alcohol use.  Reports he has not had any individual therapy in the last 6 years.  He has sporadically been attending AA meetings the past few years and he did attempt 1 outpatient program at [facility] within the past year, but only did about a month and then left prior to completion.      HISTORY OF ALCOHOL AND DRUG USE:  Client reports alcohol use, age of first use 14.  He reports for the past 6 years he has been drinking about 4-5 times a week.  Usually on like weekends when he does not have to work, he will drink up to 15 beers.  On weekdays, he may drink up to 8 beers.  Prior to that, he had no use for about 14 years, and last use was 07/27/2018.  Client also reports marijuana use, unspecified age of first use.  Reports in the past  year and a half, he smoked weed 3-4 times, most recent was 3-4 months ago.  In his 20s, he was smoking a bit heavier, but denies any other regular use of marijuana.  Client also is a nicotine user, unspecified age of first use.  For the past 6 years, he has been chewing 1 can every 2 days, and he quit smoking cigarettes back in 1999.  Last use 08/07/2018.  Client denies any other substance use.      SUMMARY OF CHEMICAL DEPENDENCY SYMPTOMS ACKNOWLEDGED BY CLIENT:  Client identifies with 8 out of the 11 DSM-V criteria for impression of a substance use disorder.      SUMMARY OF COLLATERAL DATA:  [facility] and she confirmed past legal history and that client has been compliant with all testing requirements and no positives.  Also [facility] electronic health record was reviewed.      MENTAL STATUS ASSESSMENT:  Physical appearance and attire:  Appears stated age, and attire appropriate to age and situation.  Hygiene well groomed.  Eye contact at examiner.  Speech regular, volume regular, quality fluid.  Cognitive perceptual reality based.  Cognition:  Memory intact.  Judgment intact.  Insight intact.  Orientation to time, place, person and situation.  Thought logical.  Hallucinations:  None.  General behavioral tone:  Cooperative.  Psychomotor activity:  No problem noted.  Gait:  No problem.  Mood appropriate.  Affect congruent and appropriate.      VULNERABLE ADULT ASSESSMENT:  This person is not a functional vulnerable adult according to [place] statute 626.5572, subdivision 21.      DIAGNOSTIC IMPRESSION:     1.  F10.20, alcohol use disorder, severe.   2.  F17.200, tobacco use disorder, moderate.      [facility] PLACEMENT CRITERIA:   DIMENSION 1:  Intoxication withdrawal:  Risk level 0.  No current concerns.      DIMENSION 2:  Biomedical conditions:  Risk level 1.  Client has a primary care provider and managed health concerns.  He is able to seek services and  denies any immediate concerns.      DIMENSION 3:  Emotional/Behavioral:  Risk level 2.  Client reports depression and anxiety.  He has no mental health providers, but does take medication.  He reports multiple stressors at this time.  Reports passive suicidal ideation without any plan or intent.  He denied any ideation this day.  Screens indicate moderate depression and anxiety symptoms and referral for co-occurring services.      DIMENSION 4:  Readiness to Change:  Risk level 1.  Client recognizes problems with his alcohol use and does want to reestablish a sober lifestyle, but has not had motivation to follow through with support until consequences.      DIMENSION 5:  Relapse and Continued Use Potential:  Risk level 3.  Client has no history of formal treatment, but reports he was sober for nearly 14 years with the support of AA.  He does not attend regular meetings at this time.  He has co-occurring disorders and lacks education on the connection of drinking and mental health and daily sober living skills.      DIMENSION 6:  Recovery Environment:  Risk level 3.  The client reports he is single, owns his home and has a roommate.  He is employed full-time.  Denies any job concerns, but transportation will be an issue.  He recently received a DWI and is pending formal charge.  He reports sober support, but isolates from social network.  He lacks any meaningful activity.      INITIAL PROBLEM LIST:  Client has poor coping skills and lacks a sober peer support network.      RECOMMENDATIONS:  Client is recommended to attend a co-occurring outpatient treatment program.      RATIONALE:  At this time, client is meeting criteria for substance use disorder.  He has lacked ability to reestablish his sobriety or utilize sober support and would benefit from outpatient for accountability, develop further education on addiction, but also the co-occurring issues to address his depression and anxiety symptoms.         This  information has been disclosed to you from records protected by Federal confidentiality rules (42 CFR part 2). The Federal rules prohibit you from making any further disclosure of this information unless further disclosure is expressly permitted by the written consent of the person to whom it pertains or as otherwise permitted by 42 CFR part 2. A general authorization for the release of medical or other information is NOT sufficient for this purpose. The Federal rules restrict any use of the information to criminally investigate or prosecute any alcohol or drug abuse patient.      ABIMAEL GEE             D: 2018   T: 2018   MT: DT      Name:     TESSIE MYERS   MRN:      [id number]        Account:      [id number]   :      1970           Visit Date:   2018      Document: [id number]

## 2018-11-06 ENCOUNTER — TELEPHONE (OUTPATIENT)
Dept: FAMILY MEDICINE | Facility: CLINIC | Age: 48
End: 2018-11-06

## 2018-11-06 NOTE — TELEPHONE ENCOUNTER
Panel Management Review      Patient has the following on his problem list:     Depression / Dysthymia review    Measure:  Needs PHQ-9 score of 4 or less during index window.  Administer PHQ-9 and if score is 5 or more, send encounter to provider for next steps.    5 - 7 month window range:     PHQ-9 SCORE 5/19/2017 5/4/2018 5/31/2018   Total Score - - -   Total Score 14 13 5   Some encounter information is confidential and restricted. Go to Review Flowsheets activity to see all data.       If PHQ-9 recheck is 5 or more, route to provider for next steps.    Patient is due for:  PHQ9      Composite cancer screening  Chart review shows that this patient is due/due soon for the following None  Summary:    Patient is due/failing the following:   PHQ9    Action needed:   Patient needs to do PHQ9.    Type of outreach:    Phone, left message for patient to call back.     Questions for provider review:    None                                                                                                                                    Fanta Harrington CMA       Chart routed to  .

## 2019-01-26 ENCOUNTER — TRANSFERRED RECORDS (OUTPATIENT)
Dept: HEALTH INFORMATION MANAGEMENT | Facility: CLINIC | Age: 49
End: 2019-01-26

## 2019-07-22 ENCOUNTER — OFFICE VISIT (OUTPATIENT)
Dept: FAMILY MEDICINE | Facility: CLINIC | Age: 49
End: 2019-07-22
Payer: COMMERCIAL

## 2019-07-22 ENCOUNTER — ANCILLARY PROCEDURE (OUTPATIENT)
Dept: GENERAL RADIOLOGY | Facility: CLINIC | Age: 49
End: 2019-07-22
Attending: PHYSICIAN ASSISTANT
Payer: COMMERCIAL

## 2019-07-22 VITALS
SYSTOLIC BLOOD PRESSURE: 160 MMHG | HEART RATE: 78 BPM | RESPIRATION RATE: 18 BRPM | BODY MASS INDEX: 43.54 KG/M2 | DIASTOLIC BLOOD PRESSURE: 110 MMHG | OXYGEN SATURATION: 100 % | WEIGHT: 315 LBS | TEMPERATURE: 98.2 F

## 2019-07-22 DIAGNOSIS — E66.01 MORBID OBESITY DUE TO EXCESS CALORIES (H): ICD-10-CM

## 2019-07-22 DIAGNOSIS — F32.5 MAJOR DEPRESSION IN COMPLETE REMISSION (H): ICD-10-CM

## 2019-07-22 DIAGNOSIS — I10 BENIGN ESSENTIAL HYPERTENSION: ICD-10-CM

## 2019-07-22 DIAGNOSIS — F10.20 ALCOHOLISM (H): ICD-10-CM

## 2019-07-22 DIAGNOSIS — S05.92XA LEFT ORBIT TRAUMA, INITIAL ENCOUNTER: Primary | ICD-10-CM

## 2019-07-22 PROCEDURE — 99214 OFFICE O/P EST MOD 30 MIN: CPT | Performed by: PHYSICIAN ASSISTANT

## 2019-07-22 PROCEDURE — 70200 X-RAY EXAM OF EYE SOCKETS: CPT | Mod: FY

## 2019-07-22 RX ORDER — LISINOPRIL 40 MG/1
40 TABLET ORAL DAILY
Qty: 90 TABLET | Refills: 0 | Status: SHIPPED | OUTPATIENT
Start: 2019-07-22 | End: 2019-11-19

## 2019-07-22 ASSESSMENT — PATIENT HEALTH QUESTIONNAIRE - PHQ9: SUM OF ALL RESPONSES TO PHQ QUESTIONS 1-9: 15

## 2019-07-22 NOTE — PROGRESS NOTES
Subjective     Homer Wells is a 49 year old male who presents to clinic today for the following health issues:    HPI   Eye(s) Problem      Duration: 2 days    Description:  Location: left  Pain: YES  Redness: YES  Discharge: YES    Accompanying signs and symptoms: swelling around eye    History (Trauma, foreign body exposure,): wears a c pap mask at night    Precipitating or alleviating factors (contact use): None    Therapies tried and outcome: Ice    Left orbit swelling  Discharge from eye - normal for him at baseline    He suspects he had a fall - he was drinking heavily and does not remember the entire evening  He woke up the next day with severe left eye swelling, left rib pain, and a scrape over the left hip  He denies any vision changes in the left eye or any pain of the eye  He has a tender spot along the left medial orbit   The left side of his nose is numb        Patient Active Problem List   Diagnosis     Lipoma     Anxiety state     Sinus tachycardia     Chronic rhinitis     Personal history of DVT (deep vein thrombosis)     Acute right-sided low back pain without sciatica     Morbid obesity due to excess calories (H):BMI 40-50 w comorbid HTN      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 No pertinent surgical history.    Social History     Tobacco Use     Smoking status: Former Smoker     Last attempt to quit: 1999      Years since quittin.5     Smokeless tobacco: Current User   Substance Use Topics     Alcohol use: Yes     Family History   Problem Relation Age of Onset     Substance Abuse Mother      Dementia Mother      Hypertension Father      Diabetes Father      Cancer - colorectal Father      Unknown/Adopted Father      Substance Abuse Maternal Grandmother      Substance Abuse Maternal Grandfather      No Known Problems Paternal Grandmother      Substance Abuse Paternal Grandfather      No Known Problems Brother      Substance Abuse Sister      No Known Problems Son      No Known Problems Daughter      No Known Problems Other      Coronary Artery Disease No family hx of      Hyperlipidemia No family hx of      Cerebrovascular Disease No family hx of      Breast Cancer No family hx of      Colon Cancer No family hx of      Prostate Cancer No family hx of      Other Cancer No family hx of      Depression No family hx of      Anxiety Disorder No family hx of      Mental Illness No family hx of      Anesthesia Reaction No family hx of      Asthma No family hx of      Osteoporosis No family hx of      Genetic Disorder No family hx of      Thyroid Disease No family hx of      Obesity No family hx of      Schizophrenia No family hx of      Bipolar Disorder No family hx of      Suicide No family hx of      Anastasiya Disease No family hx of      Parkinsonism No family hx of      Autism Spectrum Disorder No family hx of      Intellectual Disability (Mental Retardation) No family hx of          Current Outpatient Medications   Medication Sig Dispense Refill     allopurinol (ZYLOPRIM) 300 MG tablet TAKE ONE TABLET BY MOUTH ONE TIME DAILY  30 tablet 5     citalopram (CELEXA) 20 MG tablet TAKE ONE TABLET BY MOUTH ONE TIME DAILY  30 tablet 0     lisinopril (PRINIVIL/ZESTRIL) 40 MG tablet Take 1 tablet (40 mg) by mouth daily 90 tablet 0     metoprolol tartrate (LOPRESSOR) 100 MG tablet Take 1 tablet (100 mg) by mouth 2 times daily 180  tablet 0     metoprolol tartrate (LOPRESSOR) 50 MG tablet Take 1 tablet (50 mg) by mouth 2 times daily 180 tablet 0     Multiple Vitamin (MULTI VITAMIN MENS PO) Take 1 tablet by mouth.       traZODone (DESYREL) 50 MG tablet Take 1 tablet (50 mg) by mouth nightly as needed for sleep 90 tablet 0     Allergies   Allergen Reactions     Other [Seasonal Allergies]      Pollen       Reviewed and updated as needed this visit by Provider         Review of Systems   Constitutional: Negative.    HENT:        As in HPI   Eyes:        As in HPI   Respiratory: Negative.    Cardiovascular: Negative.    Gastrointestinal: Negative.    Genitourinary: Negative.    Musculoskeletal:        As in HPI   Skin:        As in HPI   Neurological: Negative.    Psychiatric/Behavioral: Negative.          Objective    BP (!) 160/110 (Cuff Size: Adult Large)   Pulse 78   Temp 98.2  F (36.8  C) (Tympanic)   Resp 18   Wt 149.7 kg (330 lb)   SpO2 100%   BMI 43.54 kg/m    Physical Exam   Constitutional: He is oriented to person, place, and time. He appears well-developed and well-nourished. No distress.   HENT:   Head: Normocephalic.   Right Ear: External ear normal.   Left Ear: External ear normal.   Nose: Nose normal. No mucosal edema, nose lacerations, nasal deformity, septal deviation or nasal septal hematoma. No epistaxis.   Eyes: Pupils are equal, round, and reactive to light. Left conjunctiva is injected. Left conjunctiva has no hemorrhage. Right eye exhibits normal extraocular motion. Left eye exhibits normal extraocular motion.   Left eye - lower lid edematous without erythema, no laceration.  Tender over the medial orbit and left side of nose.  Decreased sensation over the left side of nose.     Neck: Normal range of motion.   Pulmonary/Chest: Effort normal and breath sounds normal. He exhibits tenderness (left anterior).   Neurological: He is alert and oriented to person, place, and time.   Skin: Abrasion (left lateral hip, no erythema  or drainage) noted. He is not diaphoretic.   Psychiatric: He has a normal mood and affect. Judgment normal.       Diagnostic Test Results:  No results found for this or any previous visit (from the past 24 hour(s)).      XR orbits  ORBITS FOUR OR MORE VIEWS July 22, 2019 3:43 PM   HISTORY: Left orbit trauma, initial encounter.  COMPARISON: None.                                                    IMPRESSION: No definite orbital fractures are appreciated by x-ray.  Maxillofacial CT could be performed if clinically indicated.  RORY LOUIS MD    Assessment & Plan   Problem List Items Addressed This Visit        Circulatory    Benign essential hypertension- uncontrolled on meds     Relevant Medications    lisinopril (PRINIVIL/ZESTRIL) 40 MG tablet      Other Visit Diagnoses     Left orbit trauma, initial encounter    -  Primary    Relevant Orders    XR Orbits G/E 3 Views    OTOLARYNGOLOGY REFERRAL    Alcoholism (H)        Relevant Orders    ADDICTION MEDICINE REFERRAL         Patient is concerned about the cost of CT scan - ok with orbit XR  We discussed the CT would give a better image, and he declined  Advised he return to [facility] - he has gone on and off for 13 years  Addiction medicine referral provided  He expresses interest in sobriety     No obvious fracture of the orbit on XR, and patient declines CT scan  Referral to ENT as above - encouraged close follow up  Swelling and numbness need to be followed.   On exam - no obvious fracture defect and normal EOM.  Mostly likely he had a contusion of the anterior ethmoid nerve, based on the pattern of numbness    There are no Patient Instructions on file for this visit.    Return in about 1 week (around 7/29/2019) for eye recheck, come in to primary care sooner if the discharge worsens, Specialist Appointment.    Kristy Arreaga PA-C  [facility]

## 2019-07-23 ENCOUNTER — TELEPHONE (OUTPATIENT)
Dept: ADDICTION MEDICINE | Facility: CLINIC | Age: 49
End: 2019-07-23

## 2019-07-23 NOTE — TELEPHONE ENCOUNTER
Please review referral. Please route back to reception pool #[phone].    Thank you,    Sabine Pineda  [facility]

## 2019-07-24 NOTE — TELEPHONE ENCOUNTER
Writer attempted to reach pt; no answer. LVM requesting a call back for an appt. Two more attempts will be made.     Sabine Pineda  [facility]

## 2019-07-26 ASSESSMENT — ENCOUNTER SYMPTOMS
PSYCHIATRIC NEGATIVE: 1
CARDIOVASCULAR NEGATIVE: 1
NEUROLOGICAL NEGATIVE: 1
RESPIRATORY NEGATIVE: 1
GASTROINTESTINAL NEGATIVE: 1
ROS SKIN COMMENTS: AS IN HPI
CONSTITUTIONAL NEGATIVE: 1

## 2019-08-01 NOTE — TELEPHONE ENCOUNTER
Writer attempted to reach pt; no answer. LVM requesting a call back for an appt. One more attempt will be made.     Sabine Pineda  [facility]

## 2019-08-13 NOTE — TELEPHONE ENCOUNTER
Third and final attempt to reach pt; no answer. LVM requesting a call back for an appt.   Writer is routing this encounter to referring provider. Please inform pt that we tried to reach him to get him scheduled with an addiction medicine provider. Please have pt call us back if he is still interested in being seen at [facility]. [phone]. Writer is closing this encounter, no further action needed.     Thank you,  Sabine Pineda  [facility]

## 2019-08-20 ENCOUNTER — OFFICE VISIT (OUTPATIENT)
Dept: FAMILY MEDICINE | Facility: CLINIC | Age: 49
End: 2019-08-20
Payer: COMMERCIAL

## 2019-08-20 VITALS
RESPIRATION RATE: 18 BRPM | HEART RATE: 68 BPM | DIASTOLIC BLOOD PRESSURE: 116 MMHG | OXYGEN SATURATION: 96 % | WEIGHT: 315 LBS | SYSTOLIC BLOOD PRESSURE: 158 MMHG | HEIGHT: 73 IN | BODY MASS INDEX: 41.75 KG/M2 | TEMPERATURE: 98.3 F

## 2019-08-20 DIAGNOSIS — Z12.11 SPECIAL SCREENING FOR MALIGNANT NEOPLASMS, COLON: ICD-10-CM

## 2019-08-20 DIAGNOSIS — S05.92XS: ICD-10-CM

## 2019-08-20 DIAGNOSIS — M10.9 GOUT, UNSPECIFIED CAUSE, UNSPECIFIED CHRONICITY, UNSPECIFIED SITE: ICD-10-CM

## 2019-08-20 DIAGNOSIS — F10.20 ALCOHOLISM (H): ICD-10-CM

## 2019-08-20 DIAGNOSIS — E66.01 MORBID OBESITY DUE TO EXCESS CALORIES (H): ICD-10-CM

## 2019-08-20 DIAGNOSIS — G47.00 PERSISTENT INSOMNIA: ICD-10-CM

## 2019-08-20 DIAGNOSIS — I10 BENIGN ESSENTIAL HYPERTENSION: Primary | ICD-10-CM

## 2019-08-20 DIAGNOSIS — F33.41 RECURRENT MAJOR DEPRESSIVE DISORDER, IN PARTIAL REMISSION (H): ICD-10-CM

## 2019-08-20 DIAGNOSIS — R73.01 IMPAIRED FASTING GLUCOSE: ICD-10-CM

## 2019-08-20 DIAGNOSIS — Z80.0 FAMILY HISTORY OF COLON CANCER: ICD-10-CM

## 2019-08-20 DIAGNOSIS — Z83.3 FAMILY HISTORY OF DIABETES MELLITUS: ICD-10-CM

## 2019-08-20 DIAGNOSIS — Z12.5 SCREENING FOR PROSTATE CANCER: ICD-10-CM

## 2019-08-20 DIAGNOSIS — Z71.41 ENCOUNTER FOR ALCOHOLISM COUNSELING: ICD-10-CM

## 2019-08-20 PROCEDURE — G0103 PSA SCREENING: HCPCS | Performed by: FAMILY MEDICINE

## 2019-08-20 PROCEDURE — 84460 ALANINE AMINO (ALT) (SGPT): CPT | Performed by: FAMILY MEDICINE

## 2019-08-20 PROCEDURE — 99214 OFFICE O/P EST MOD 30 MIN: CPT | Performed by: FAMILY MEDICINE

## 2019-08-20 PROCEDURE — 82947 ASSAY GLUCOSE BLOOD QUANT: CPT | Performed by: FAMILY MEDICINE

## 2019-08-20 PROCEDURE — 36415 COLL VENOUS BLD VENIPUNCTURE: CPT | Performed by: FAMILY MEDICINE

## 2019-08-20 PROCEDURE — 84550 ASSAY OF BLOOD/URIC ACID: CPT | Performed by: FAMILY MEDICINE

## 2019-08-20 PROCEDURE — 84450 TRANSFERASE (AST) (SGOT): CPT | Performed by: FAMILY MEDICINE

## 2019-08-20 RX ORDER — TRAZODONE HYDROCHLORIDE 50 MG/1
50 TABLET, FILM COATED ORAL AT BEDTIME
Qty: 90 TABLET | Refills: 0 | Status: SHIPPED | OUTPATIENT
Start: 2019-08-20 | End: 2020-03-25

## 2019-08-20 ASSESSMENT — ANXIETY QUESTIONNAIRES
3. WORRYING TOO MUCH ABOUT DIFFERENT THINGS: MORE THAN HALF THE DAYS
2. NOT BEING ABLE TO STOP OR CONTROL WORRYING: MORE THAN HALF THE DAYS
IF YOU CHECKED OFF ANY PROBLEMS ON THIS QUESTIONNAIRE, HOW DIFFICULT HAVE THESE PROBLEMS MADE IT FOR YOU TO DO YOUR WORK, TAKE CARE OF THINGS AT HOME, OR GET ALONG WITH OTHER PEOPLE: NOT DIFFICULT AT ALL
6. BECOMING EASILY ANNOYED OR IRRITABLE: SEVERAL DAYS
GAD7 TOTAL SCORE: 9
1. FEELING NERVOUS, ANXIOUS, OR ON EDGE: SEVERAL DAYS
7. FEELING AFRAID AS IF SOMETHING AWFUL MIGHT HAPPEN: SEVERAL DAYS
5. BEING SO RESTLESS THAT IT IS HARD TO SIT STILL: NOT AT ALL

## 2019-08-20 ASSESSMENT — PATIENT HEALTH QUESTIONNAIRE - PHQ9
SUM OF ALL RESPONSES TO PHQ QUESTIONS 1-9: 11
5. POOR APPETITE OR OVEREATING: MORE THAN HALF THE DAYS

## 2019-08-20 ASSESSMENT — MIFFLIN-ST. JEOR: SCORE: 2442.04

## 2019-08-20 NOTE — LETTER
My Depression Action Plan  Name: Homer Wells   Date of Birth 1970  Date: 8/20/2019    My doctor: Bernard Cruz   My clinic: [address] [address]  [phone]          GREEN    ZONE   Good Control    What it looks like:     Things are going generally well. You have normal up s and down s. You may even feel depressed from time to time, but bad moods usually last less than a day.   What you need to do:  1. Continue to care for yourself (see self care plan)  2. Check your depression survival kit and update it as needed  3. Follow your physician s recommendations including any medication.  4. Do not stop taking medication unless you consult with your physician first.           YELLOW         ZONE Getting Worse    What it looks like:     Depression is starting to interfere with your life.     It may be hard to get out of bed; you may be starting to isolate yourself from others.    Symptoms of depression are starting to last most all day and this has happened for several days.     You may have suicidal thoughts but they are not constant.   What you need to do:     1. Call your care team, your response to treatment will improve if you keep your care team informed of your progress. Yellow periods are signs an adjustment may need to be made.     2. Continue your self-care, even if you have to fake it!    3. Talk to someone in your support network    4. Open up your depression survival kit           RED    ZONE Medical Alert - Get Help    What it looks like:     Depression is seriously interfering with your life.     You may experience these or other symptoms: You can t get out of bed most days, can t work or engage in other necessary activities, you have trouble taking care of basic hygiene, or basic responsibilities, thoughts of suicide or death that will not go away, self-injurious behavior.     What you need  to do:  1. Call your care team and request a same-day appointment. If they are not available (weekends or after hours) call your local crisis line, emergency room or 911.            Depression Self Care Plan / Survival Kit    Self-Care for Depression  Here s the deal. Your body and mind are really not as separate as most people think.  What you do and think affects how you feel and how you feel influences what you do and think. This means if you do things that people who feel good do, it will help you feel better.  Sometimes this is all it takes.  There is also a place for medication and therapy depending on how severe your depression is, so be sure to consult with your medical provider and/ or Behavioral Health Consultant if your symptoms are worsening or not improving.     In order to better manage my stress, I will:    Exercise  Get some form of exercise, every day. This will help reduce pain and release endorphins, the  feel good  chemicals in your brain. This is almost as good as taking antidepressants!  This is not the same as joining a gym and then never going! (they count on that by the way ) It can be as simple as just going for a walk or doing some gardening, anything that will get you moving.      Hygiene   Maintain good hygiene (Get out of bed in the morning, Make your bed, Brush your teeth, Take a shower, and Get dressed like you were going to work, even if you are unemployed).  If your clothes don't fit try to get ones that do.    Diet  I will strive to eat foods that are good for me, drink plenty of water, and avoid excessive sugar, caffeine, alcohol, and other mood-altering substances.  Some foods that are helpful in depression are: complex carbohydrates, B vitamins, flaxseed, fish or fish oil, fresh fruits and vegetables.    Psychotherapy  I agree to participate in Individual Therapy (if recommended).    Medication  If prescribed medications, I agree to take them.  Missing doses can result in  serious side effects.  I understand that drinking alcohol, or other illicit drug use, may cause potential side effects.  I will not stop my medication abruptly without first discussing it with my provider.    Staying Connected With Others  I will stay in touch with my friends, family members, and my primary care provider/team.    Use your imagination  Be creative.  We all have a creative side; it doesn t matter if it s oil painting, sand castles, or mud pies! This will also kick up the endorphins.    Witness Beauty  (AKA stop and smell the roses) Take a look outside, even in mid-winter. Notice colors, textures. Watch the squirrels and birds.     Service to others  Be of service to others.  There is always someone else in need.  By helping others we can  get out of ourselves  and remember the really important things.  This also provides opportunities for practicing all the other parts of the program.    Humor  Laugh and be silly!  Adjust your TV habits for less news and crime-drama and more comedy.    Control your stress  Try breathing deep, massage therapy, biofeedback, and meditation. Find time to relax each day.     My support system    Clinic Contact:  Phone number:    Contact 1:  Phone number:    Contact 2:  Phone number:    Amish/:  Phone number:    Therapist:  Phone number:    Local crisis center:    Phone number:    Other community support:  Phone number:

## 2019-08-20 NOTE — PROGRESS NOTES
Subjective     Homer Wells is a 49 year old male who presents to clinic today for the following health issues:    HPI   LT Eye Orbital Contusion       Duration: Injury 7/20/19    Completely resolved with normal vision     Fell on something while drunk     Did not discover till the next am     Description:  Location: left  Pain: no  Redness: no  Discharge: no    Accompanying signs and symptoms: symptoms resolved    History (Trauma, foreign body exposure,): None    Precipitating or alleviating factors (contact use): None    Therapies tried and outcome: None    Hypertension -Uncontrolled     Do you check your blood pressure regularly outside of the clinic? No     Are you following a low salt diet? No    Are your blood pressures ever more than 140 on the top number (systolic) OR more   than 90 on the bottom number (diastolic), for example 140/90? Yes   On toprol 100mgm since 2012     Glucose Intolerance   Follow-up      How often are you checking your blood sugar? Not at all    Hi FBS     What time of day are you checking your blood sugars (select all that apply)?      Have you had any blood sugars above 200?  No    Have you had any blood sugars below 70?  No    What symptoms do you notice when your blood sugar is low?  None    What concerns do you have today about your diabetes? None     Do you have any of these symptoms? (Select all that apply)  No numbness or tingling in feet.  No redness, sores or blisters on feet.  No complaints of excessive thirst.  No reports of blurry vision.  No significant changes to weight.     Have you had a diabetic eye exam in the last 12 months? No    BP Readings from Last 2 Encounters:   08/20/19 (!) 158/116   07/22/19 (!) 160/110     LDL Cholesterol Calculated (mg/dL)   Date Value   06/04/2015 86     LDL Cholesterol Direct (mg/dL)   Date Value   06/13/2013 126       Diabetes Management Resources  Depression and Anxiety Follow-Up    How are you doing with your depression since your  last visit? Worsened off meds     How are you doing with your anxiety since your last visit?  Worsened     Are you having other symptoms that might be associated with depression or anxiety? No    Have you had a significant life event? No     Do you have any concerns with your use of alcohol or other drugs? Yes:  q d     Social History     Tobacco Use     Smoking status: Former Smoker     Last attempt to quit:      Years since quittin.6     Smokeless tobacco: Current User   Substance Use Topics     Alcohol use: Yes     Drug use: No     PHQ 2018   PHQ-9 Total Score 5 15 11   Q9: Thoughts of better off dead/self-harm past 2 weeks Not at all Not at all Not at all   Some encounter information is confidential and restricted. Go to Review Flowsheets activity to see all data.     JUAN C-7 SCORE 2018   Total Score 9 2 9   Some encounter information is confidential and restricted. Go to Review Flowsheets activity to see all data.           Suicide Assessment Five-step Evaluation and Treatment (SAFE-T)      ALCOHOL ABUSE     -SINCE TEENS   - DAILY BEER TO 8 PK   - hx of falls     MORBID OBESITY    --BMI= 44.6   -comorbid glu intol, HTN , gout ,   -active on the job   -drinks q d     FAMILY HX COLON CA     - fr at 55y/o with mets   - pt is 50y/o and needs start   -last one 10 yrs ago       Gout  Duration: yrs   No episodes as long as on allopurinol   Description   Location: big toe - bilateral  Joint Swelling: YES  Redness: YES  Pain intensity:  moderate  Accompanying signs and symptoms: None  History  Previous history of gout: YES   Trauma to the area: no   Precipitating factors:   Alcohol usage: Frequent  Diuretic use: no   Recent illness: no   Therapies tried and outcome: allopurinol    Insomnia      Duration: lifelong     Did well on trazodone 50mgm until stoppedy by another MD     Description  Frequency of insomnia:  nightly  Time to fall asleep: 45 minutes  Middle of  "night awakening:  nightly  Early morning awakening:  nightly    Accompanying signs and symptoms:  none    History  Similar episodes in past:  no   Previous evaluation/sleep study:  no     Precipitating or alleviating factors:  New stressful situation: no   Caffeine intake after lunchtime: no   OTC decongestants: no   Any new medications: no     Therapies tried and outcome: trazodone          Reviewed and updated as needed this visit by Provider  Tobacco  Allergies  Meds  Problems  Med Hx  Surg Hx  Fam Hx         Review of Systems   ROS COMP: CONSTITUTIONAL: NEGATIVE for fever, chills, change in weight  INTEGUMENTARY/SKIN: NEGATIVE for worrisome rashes, moles or lesions  EYES: NEGATIVE for vision changes or irritation  ENT/MOUTH: NEGATIVE for ear, mouth and throat problems  RESP: NEGATIVE for significant cough or SOB  BREAST: NEGATIVE for masses, tenderness or discharge  CV: NEGATIVE for chest pain, palpitations or peripheral edema  GI: NEGATIVE for nausea, abdominal pain, heartburn, or change in bowel habits  : NEGATIVE for frequency, dysuria, or hematuria  MUSCULOSKELETAL: NEGATIVE for significant arthralgias or myalgia  NEURO: NEGATIVE for weakness, dizziness or paresthesias  ENDOCRINE: NEGATIVE for temperature intolerance, skin/hair changes  HEME: NEGATIVE for bleeding problems  PSYCHIATRIC: NEGATIVE for changes in mood or affect      Objective    BP (!) 158/116 (Patient Position: Sitting, Cuff Size: Adult Large)   Pulse 68   Temp 98.3  F (36.8  C) (Tympanic)   Resp 18   Ht 1.854 m (6' 1\")   Wt (!) 153.3 kg (338 lb)   SpO2 96%   BMI 44.59 kg/m    Body mass index is 44.59 kg/m .  Physical Exam   GENERAL: healthy, alert, no distress and obese  EYES: Eyes grossly normal to inspection, PERRL and conjunctivae and sclerae normal  HENT: ear canals and TM's normal, nose and mouth without ulcers or lesions  NECK: no adenopathy, no asymmetry, masses, or scars and thyroid normal to palpation  RESP: lungs " "clear to auscultation - no rales, rhonchi or wheezes  CV: regular rate and rhythm, normal S1 S2, no S3 or S4, no murmur, click or rub, no peripheral edema and peripheral pulses strong  ABDOMEN: soft, nontender, no hepatosplenomegaly, no masses and bowel sounds normal  MS: no gross musculoskeletal defects noted, no edema  SKIN: no suspicious lesions or rashes  NEURO: Normal strength and tone, mentation intact and speech normal  PSYCH: mentation appears normal, affect normal/bright    Diagnostic Test Results:  Labs reviewed in Epic        Assessment & Plan       ICD-10-CM    1. Benign essential hypertension- uncontrolled on meds  since 2012 I10    2. Recurrent major depressive disorder, in partial remission (H)-ISSUE OF treating  F33.41 traZODone (DESYREL) 50 MG tablet   3. Persistent insomnia-resolved on  treatment-issue of restarting Rx  G47.00    4. Impaired fasting glucose R73.01 Glucose   5. Family history of diabetes mellitus Z83.3    6. Gout, unspecified cause, unspecified chronicity, unspecified site M10.9 Uric acid   7. Alcoholism (H) F10.20 ALT     AST   8. Family history of colon cancer Z80.0    9. Left orbit trauma, sequela S05.92XS    10. Morbid obesity due to excess calories (H) E66.01 ALT     AST   11. Screening for prostate cancer Z12.5 Prostate spec antigen screen   12. Special screening for malignant neoplasms, colon Z12.11 GASTROENTEROLOGY ADULT REF PROCEDURE ONLY Other; [facility] [phone]     CANCELED: GASTROENTEROLOGY ADULT REF PROCEDURE ONLY   13. Encounter for alcoholism counseling Z71.41         BMI:   Estimated body mass index is 44.59 kg/m  as calculated from the following:    Height as of this encounter: 1.854 m (6' 1\").    Weight as of this encounter: 153.3 kg (338 lb).   Weight management plan: Discussed healthy diet and exercise guidelines        Patient Instructions   1.  Weight Loss Tips  1. Do not eat after 6 hrs before your expected bedtime  2. Have your heaviest meal for breakfast, " a slightly lighter meal at lunch and a snack 6 hrs before bed  3. No sugar/calorie drinks except milk ie no fruit juice, pop, alcohol.  4. Drink milk 30min before meals to decrease your hunger. Also it is excellent as part of your last meal of the day snack  5. Drink lots of water  6. Increase fiber in diet: all bran cereal, salads, popcorn etc  7. Have only one small serving of fruit a day about 1/2 cup (as this is high in sugar)  8. EXERCISE is the bottom line. Without it, you will gain weight even on a low calorie diet. Best if done 2-3X a day as can    Being overweight contributes to high blood pressure and high cholesterol, both of which cause heart attacks, strokes and kidney failure, prediabetes and diabetes, arthritis, and liver disease     2. Shingrex is a 2 shot series that prevents shingles 97% of the time, as opposed to the old shingles shot that only prevented it at 40-50%  It costs less for medicare at a pharmacy  You should get it starting at 50 yrs old get the 2nd shot 5-6 mo after the first one    3.  Cut way downon the alcohol    4.. Take 2 of  the metoprolol 100mgm --== 200mgm     In 2 weeks start taking and recording you BP & heart rate     Call if rate is < 45-50     Your blood pressure has been high or you are starting a new medication for it :   Please take 2-4 blood pressures and heart rates a week and write them down with date, time of day and location and bring this record in in 3-5 weeks. The optimal blood  pressure is < 140/80 or close to this most of the time.    Restart the trazodone 50mgm EVERY night       did encourage wt loss and stop alcohol --risk factors explained     Return in about 3 weeks (around 9/10/2019) for BP Recheck, depression--bring your BP record .    Liliana Mattson MD  [facility]

## 2019-08-20 NOTE — PATIENT INSTRUCTIONS
1.  Weight Loss Tips  1. Do not eat after 6 hrs before your expected bedtime  2. Have your heaviest meal for breakfast, a slightly lighter meal at lunch and a snack 6 hrs before bed  3. No sugar/calorie drinks except milk ie no fruit juice, pop, alcohol.  4. Drink milk 30min before meals to decrease your hunger. Also it is excellent as part of your last meal of the day snack  5. Drink lots of water  6. Increase fiber in diet: all bran cereal, salads, popcorn etc  7. Have only one small serving of fruit a day about 1/2 cup (as this is high in sugar)  8. EXERCISE is the bottom line. Without it, you will gain weight even on a low calorie diet. Best if done 2-3X a day as can    Being overweight contributes to high blood pressure and high cholesterol, both of which cause heart attacks, strokes and kidney failure, prediabetes and diabetes, arthritis, and liver disease     2. Shingrex is a 2 shot series that prevents shingles 97% of the time, as opposed to the old shingles shot that only prevented it at 40-50%  It costs less for medicare at a pharmacy  You should get it starting at 50 yrs old get the 2nd shot 5-6 mo after the first one    3.  Cut way downon the alcohol    4.. Take 2 of  the metoprolol 100mgm --== 200mgm     In 2 weeks start taking and recording you BP & heart rate     Call if rate is < 45-50     Your blood pressure has been high or you are starting a new medication for it :   Please take 2-4 blood pressures and heart rates a week and write them down with date, time of day and location and bring this record in in 3-5 weeks. The optimal blood  pressure is < 140/80 or close to this most of the time.    Restart the trazodone 50mgm EVERY night

## 2019-08-20 NOTE — LETTER
August 28, 2019      Homer RODNEY Wells  [address] [place] [address]        Dear ,    We are writing to inform you of your test results.    Please see attached lab results   They are all normal     THE FOLLOWING ARE EXPLANATIONS OF SOME OF OUR LAB TESTS     YOU DID NOT NECESSARILY HAVE ALL OF THESE DONE     Hgb is the blood iron level   WBC means White Blood Cells   Platelets are small blood    cells that help with forming the blood clots along with other blood factors.   Electrolytes are Sodium, Potassium, Calcium, Magnesium, Phosphorus.   Liver tests are: AST, ALT, Bilirubin, Alkaline Phosphatase.   Kidney tests are Creatinine, GFR.   HDL Choles   terol - is the good cholesterol and it is good to have it high.   LDL cholesterol is the bad cholesterol and it is good to have it low.   It is recommended to have LDL less than 130 for people with hypertension and to have it less than 100 for people with   heart disease, diabetes and chronic kidney disease.   Triglycerides are another type of lipid that can cause heart disease, like the cholesterol and should be kept low   Thyroid tests are TSH, T4, T3   Glucose is sugar.   A1c is a test that gives us an idea    about how well was controlled the diabetes for the last 3 months.   PSA stands for Prostate Specific Antigen and it can be elevated with prostate cancer or prostate inflammation.     Please continue on the same medications    Resulted Orders   Prostate spec antigen screen   Result Value Ref Range    PSA 0.71 0 - 4 ug/L      Comment:      Assay Method:  Chemiluminescence using Siemens Vista analyzer   Uric acid   Result Value Ref Range    Uric Acid 4.7 3.5 - 7.2 mg/dL   ALT   Result Value Ref Range    ALT 40 0 - 70 U/L   AST   Result Value Ref Range    AST 36 0 - 45 U/L   Glucose   Result Value Ref Range    Glucose 93 70 - 99 mg/dL       If you have any questions or concerns, please call the clinic at the number listed above.        Sincerely,        Liliana Mattson MD

## 2019-08-21 LAB
ALT SERPL W P-5'-P-CCNC: 40 U/L (ref 0–70)
AST SERPL W P-5'-P-CCNC: 36 U/L (ref 0–45)
GLUCOSE SERPL-MCNC: 93 MG/DL (ref 70–99)
PSA SERPL-ACNC: 0.71 UG/L (ref 0–4)
URATE SERPL-MCNC: 4.7 MG/DL (ref 3.5–7.2)

## 2019-08-21 ASSESSMENT — ANXIETY QUESTIONNAIRES: GAD7 TOTAL SCORE: 9

## 2019-09-12 ENCOUNTER — OFFICE VISIT (OUTPATIENT)
Dept: FAMILY MEDICINE | Facility: CLINIC | Age: 49
End: 2019-09-12
Payer: COMMERCIAL

## 2019-09-12 VITALS
DIASTOLIC BLOOD PRESSURE: 80 MMHG | OXYGEN SATURATION: 94 % | WEIGHT: 315 LBS | HEART RATE: 60 BPM | HEIGHT: 73 IN | RESPIRATION RATE: 16 BRPM | TEMPERATURE: 98 F | SYSTOLIC BLOOD PRESSURE: 150 MMHG | BODY MASS INDEX: 41.75 KG/M2

## 2019-09-12 DIAGNOSIS — M10.9 GOUT, UNSPECIFIED CAUSE, UNSPECIFIED CHRONICITY, UNSPECIFIED SITE: ICD-10-CM

## 2019-09-12 DIAGNOSIS — F10.20 ALCOHOLISM (H): ICD-10-CM

## 2019-09-12 DIAGNOSIS — E66.01 MORBID OBESITY DUE TO EXCESS CALORIES (H): ICD-10-CM

## 2019-09-12 DIAGNOSIS — R09.02 HYPOXIA: ICD-10-CM

## 2019-09-12 DIAGNOSIS — F10.10 ALCOHOL ABUSE: ICD-10-CM

## 2019-09-12 DIAGNOSIS — F33.41 RECURRENT MAJOR DEPRESSIVE DISORDER, IN PARTIAL REMISSION (H): Primary | ICD-10-CM

## 2019-09-12 DIAGNOSIS — Z83.3 FAMILY HISTORY OF DIABETES MELLITUS: ICD-10-CM

## 2019-09-12 DIAGNOSIS — I10 BENIGN ESSENTIAL HYPERTENSION: ICD-10-CM

## 2019-09-12 DIAGNOSIS — G47.00 PERSISTENT INSOMNIA: ICD-10-CM

## 2019-09-12 DIAGNOSIS — R73.01 IMPAIRED FASTING GLUCOSE: ICD-10-CM

## 2019-09-12 DIAGNOSIS — G47.33 OBSTRUCTIVE SLEEP APNEA SYNDROME: ICD-10-CM

## 2019-09-12 DIAGNOSIS — Z80.0 FAMILY HISTORY OF COLON CANCER: ICD-10-CM

## 2019-09-12 PROCEDURE — 99214 OFFICE O/P EST MOD 30 MIN: CPT | Performed by: FAMILY MEDICINE

## 2019-09-12 RX ORDER — TRAZODONE HYDROCHLORIDE 50 MG/1
50 TABLET, FILM COATED ORAL AT BEDTIME
Qty: 90 TABLET | Refills: 0 | Status: CANCELLED | OUTPATIENT
Start: 2019-09-12

## 2019-09-12 ASSESSMENT — PATIENT HEALTH QUESTIONNAIRE - PHQ9
SUM OF ALL RESPONSES TO PHQ QUESTIONS 1-9: 7
5. POOR APPETITE OR OVEREATING: SEVERAL DAYS

## 2019-09-12 ASSESSMENT — ANXIETY QUESTIONNAIRES
5. BEING SO RESTLESS THAT IT IS HARD TO SIT STILL: NOT AT ALL
1. FEELING NERVOUS, ANXIOUS, OR ON EDGE: MORE THAN HALF THE DAYS
6. BECOMING EASILY ANNOYED OR IRRITABLE: SEVERAL DAYS
2. NOT BEING ABLE TO STOP OR CONTROL WORRYING: MORE THAN HALF THE DAYS
7. FEELING AFRAID AS IF SOMETHING AWFUL MIGHT HAPPEN: SEVERAL DAYS
3. WORRYING TOO MUCH ABOUT DIFFERENT THINGS: MORE THAN HALF THE DAYS
IF YOU CHECKED OFF ANY PROBLEMS ON THIS QUESTIONNAIRE, HOW DIFFICULT HAVE THESE PROBLEMS MADE IT FOR YOU TO DO YOUR WORK, TAKE CARE OF THINGS AT HOME, OR GET ALONG WITH OTHER PEOPLE: VERY DIFFICULT
GAD7 TOTAL SCORE: 9

## 2019-09-12 ASSESSMENT — MIFFLIN-ST. JEOR: SCORE: 2419.36

## 2019-09-12 NOTE — PATIENT INSTRUCTIONS
1. Continue the trazodone at 50mgm EVER Y nite     2. To decrease your blood pressure:     1) decrease your salt in your diet   2) increase greens   3) decrease red meat   4) increase fiber in diet   5) increase exercise   6) lose weight (if you are over weight )       3.  Weight Loss Tips  1. Do not eat after 6 hrs before your expected bedtime  2. Have your heaviest meal for breakfast, a slightly lighter meal at lunch and a snack 6 hrs before bed  3. No sugar/calorie drinks except milk ie no fruit juice, pop, alcohol.  4. Drink milk 30min before meals to decrease your hunger. Also it is excellent as part of your last meal of the day snack  5. Drink lots of water  6. Increase fiber in diet: all bran cereal, salads, popcorn etc  7. Have only one small serving of fruit a day about 1/2 cup (as this is high in sugar)  8. EXERCISE is the bottom line. Without it, you will gain weight even on a low calorie diet. Best if done 2-3X a day as can    Being overweight contributes to high blood pressure and high cholesterol, both of which cause heart attacks, strokes and kidney failure, prediabetes and diabetes, arthritis, and liver disease     6. Shingrex is a 2 shot series that prevents shingles 97% of the time, as opposed to the old shingles shot that only prevented it at 40-50%  It costs less for medicare at a pharmacy  You should get it starting at 50 yrs old get the 2nd shot 5-6 mo after the first one    Your 1st one was on 8-22-19    thelast one should be > 6 mo from that     7. You have a colonoscopy coming up :)    8. I need  , in a mo, the PHQ-9 , JUAN C and some BPs     9. .Your blood pressure has been high or you are starting a new medication for it :   Please take 2-4 blood pressures and heart rates a week and write them down with date, time of day and location and bring this record in in 3-5 weeks. The optimal blood  pressure is < 140/80 or close to this most of the time.

## 2019-09-12 NOTE — PROGRESS NOTES
"Subjective     Homer Wells is a 49 year old male who presents to clinic today for the following health issues:    HPI     Hypertension Follow-Up      Do you check your blood pressure regularly outside of the clinic? Yes     Much better now with only systolic hi at 150 on  2nd wk of meds and has quit alcohol     Are you following a low salt diet? No    Are your blood pressures ever more than 140 on the top number (systolic) OR more   than 90 on the bottom number (diastolic), for example 140/90? Yes    Depression and Anxiety Follow-Up      How are you doing with your depression since your last visit? Feels better     How are you doing with your anxiety since your last visit?              \"         \"      Are you having other symptoms that might be associated with depression or anxiety? No    Have you had a significant life event? No     Do you have any concerns with your use of alcohol or other drugs? Has stopped the alcohol for 6 d now     Social History     Tobacco Use     Smoking status: Former Smoker     Last attempt to quit:      Years since quittin.7     Smokeless tobacco: Former User   Substance Use Topics     Alcohol use: Yes     Drug use: No     PHQ 2018-   PHQ-9 Total Score 5 15 11                      7   Q9: Thoughts of better off dead/self-harm past 2 weeks Not at all Not at all Not at all   Some encounter information is confidential and restricted. Go to Review [employer] activity to see all data.     JUAN C-7 SCORE 2018   Total Score 9 2 9                            9   Some encounter information is confidential and restricted. Go to Review [employer] activity to see all data.     ALCOHOL     - q d use since teens at 4 / d and now none to a feew   -going to AA    - positive [facility]  -if stays off, will help the HTN, glu intol, wt, and thus the low oxygen, VONNIE           How many servings of fruits and vegetables do you eat " daily?  2-3    On average, how many sweetened beverages do you drink each day (soda, juice, sweet tea, etc)?   0    How many days per week do you miss taking your medication? 0      MORBID OBESITY    --BMI = 44  - -sedentary life style   Except for work   -comorbid hi glu, depression , VONNIE, hypoxia , HTN         Insomnia      Duration: lifelong     Description  Frequency of insomnia:  nightly  Time to fall asleep: 1 hour  Middle of night awakening:  nightly  Early morning awakening:  nightly    Accompanying signs and symptoms:  depression/mood changes and q d alcohol     History  Similar episodes in past:  YES  Previous evaluation/sleep study:  YES- has VONNIE     Precipitating or alleviating factors:  New stressful situation: no   Caffeine intake after lunchtime: no   OTC decongestants: no   Any new medications: no   Therapies tried and outcome: trazodone 50mgm and sleeps the nite     VONNIE AND HYPOXIA     Oximetry 95%   -obese     Glucose Intolerance     Follow-up      How often are you checking your blood sugar? Not at all    What time of day are you checking your blood sugars (select all that apply)?      Have you had any blood sugars above 200?  No    Have you had any blood sugars below 70?  No    What symptoms do you notice when your blood sugar is low?  None    What concerns do you have today about your diabetes? None     Do you have any of these symptoms? (Select all that apply)  No numbness or tingling in feet.  No redness, sores or blisters on feet.  No complaints of excessive thirst.  No reports of blurry vision.  No significant changes to weight.     Have you had a diabetic eye exam in the last 12 months? No     Hi FBS     fam hx DM     BP Readings from Last 2 Encounters:   09/12/19 (!) 150/80   08/20/19 (!) 158/116     LDL Cholesterol Calculated (mg/dL)   Date Value   06/04/2015 86     LDL Cholesterol Direct (mg/dL)   Date Value   06/13/2013 126       Diabetes Management Resources  Reviewed and updated as  "needed this visit by Provider  Tobacco  Allergies  Meds  Problems  Med Hx  Surg Hx  Fam Hx         Review of Systems   ROS COMP: CONSTITUTIONAL: NEGATIVE for fever, chills, change in weight  INTEGUMENTARY/SKIN: NEGATIVE for worrisome rashes, moles or lesions  EYES: NEGATIVE for vision changes or irritation  ENT/MOUTH: NEGATIVE for ear, mouth and throat problems  RESP: NEGATIVE for significant cough or SOB  BREAST: NEGATIVE for masses, tenderness or discharge  CV: NEGATIVE for chest pain, palpitations or peripheral edema  GI: NEGATIVE for nausea, abdominal pain, heartburn, or change in bowel habits  : NEGATIVE for frequency, dysuria, or hematuria  MUSCULOSKELETAL: NEGATIVE for significant arthralgias or myalgia  NEURO: NEGATIVE for weakness, dizziness or paresthesias  ENDOCRINE: NEGATIVE for temperature intolerance, skin/hair changes  HEME: NEGATIVE for bleeding problems  PSYCHIATRIC: NEGATIVE for changes in mood or affect      Objective    BP (!) 150/80   Pulse 60   Temp 98  F (36.7  C) (Tympanic)   Resp 16   Ht 1.854 m (6' 1\")   Wt (!) 151 kg (333 lb)   SpO2 94%   BMI 43.93 kg/m    Body mass index is 43.93 kg/m .  Physical Exam   GENERAL: healthy, alert, no distress and obese  EYES: Eyes grossly normal to inspection, PERRL and conjunctivae and sclerae normal  RESP: lungs clear to auscultation - no rales, rhonchi or wheezes  CV: regular rate and rhythm, normal S1 S2, no S3 or S4, no murmur, click or rub, no peripheral edema and peripheral pulses strong  MS: no gross musculoskeletal defects noted, no edema  SKIN: no suspicious lesions or rashes  NEURO: Normal strength and tone, mentation intact and speech normal  PSYCH: mentation appears normal, affect normal/bright    Diagnostic Test Results:  Labs reviewed in Epic  Results for orders placed or performed in visit on 08/20/19   Prostate spec antigen screen   Result Value Ref Range    PSA 0.71 0 - 4 ug/L   Uric acid   Result Value Ref Range    Uric " "Acid 4.7 3.5 - 7.2 mg/dL   ALT   Result Value Ref Range    ALT 40 0 - 70 U/L   AST   Result Value Ref Range    AST 36 0 - 45 U/L   Glucose   Result Value Ref Range    Glucose 93 70 - 99 mg/dL           Assessment & Plan       ICD-10-CM    1. Recurrent major depressive disorder, in partial remission (H)-ISSUE OF treating  F33.41    2. Benign essential hypertension- uncontrolled on meds  since 2012- better control on meds started on 8-19  I10    3. Persistent insomnia G47.00    4. Alcoholism (H)=- none for a wk  F10.20    5. Impaired fasting glucose R73.01    6. Hypoxia R09.02    7. Morbid obesity due to excess calories (H) E66.01    8. Obstructive sleep apnea syndrome G47.33    9. Family history of diabetes mellitus Z83.3    10. Family history of colon cancer Z80.0    11. Gout, unspecified cause, unspecified chronicity, unspecified site M10.9    12. Alcohol abuse since teens -off 1999-2012-restart  F10.10         BMI:   Estimated body mass index is 43.93 kg/m  as calculated from the following:    Height as of this encounter: 1.854 m (6' 1\").    Weight as of this encounter: 151 kg (333 lb).   Weight management plan: Discussed healthy diet and exercise guidelines& pt is determined t o cont to lose wt         Patient Instructions   1. Continue the trazodone at 50mgm EVER Y nite     2. To decrease your blood pressure:     1) decrease your salt in your diet   2) increase greens   3) decrease red meat   4) increase fiber in diet   5) increase exercise   6) lose weight (if you are over weight )       3.  Weight Loss Tips  1. Do not eat after 6 hrs before your expected bedtime  2. Have your heaviest meal for breakfast, a slightly lighter meal at lunch and a snack 6 hrs before bed  3. No sugar/calorie drinks except milk ie no fruit juice, pop, alcohol.  4. Drink milk 30min before meals to decrease your hunger. Also it is excellent as part of your last meal of the day snack  5. Drink lots of water  6. Increase fiber in diet: " all bran cereal, salads, popcorn etc  7. Have only one small serving of fruit a day about 1/2 cup (as this is high in sugar)  8. EXERCISE is the bottom line. Without it, you will gain weight even on a low calorie diet. Best if done 2-3X a day as can    Being overweight contributes to high blood pressure and high cholesterol, both of which cause heart attacks, strokes and kidney failure, prediabetes and diabetes, arthritis, and liver disease     6. Shingrex is a 2 shot series that prevents shingles 97% of the time, as opposed to the old shingles shot that only prevented it at 40-50%  It costs less for medicare at a pharmacy  You should get it starting at 50 yrs old get the 2nd shot 5-6 mo after the first one    Your 1st one was on 8-22-19    thelast one should be > 6 mo from that     7. You have a colonoscopy coming up :)    8. I need  , in a mo, the PHQ-9 , JUAN C and some BPs     9. .Your blood pressure has been high or you are starting a new medication for it :   Please take 2-4 blood pressures and heart rates a week and write them down with date, time of day and location and bring this record in in 3-5 weeks. The optimal blood  pressure is < 140/80 or close to this most of the time.    I  Explained the treatment and the reason for it   And encouraged pt in his new healthy life style     Return in about 1 month (around 10/12/2019) for BP Recheck, depression, Physical Exam.    Liliana Mattson MD  [facility]      NOTE  Pt is doing better on all :   -quit alcohol and is going to AA   -BPis getting better   -losing wt- improved depression     Liliana Mattson MD

## 2019-09-12 NOTE — NURSING NOTE
"Chief Complaint   Patient presents with     Recheck Medication     BP (!) 160/80   Pulse 60   Temp 98  F (36.7  C) (Tympanic)   Resp 16   Ht 1.854 m (6' 1\")   Wt (!) 151 kg (333 lb)   SpO2 94%   BMI 43.93 kg/m   Estimated body mass index is 43.93 kg/m  as calculated from the following:    Height as of this encounter: 1.854 m (6' 1\").    Weight as of this encounter: 151 kg (333 lb).  BP completed using cuff size: renetta Harrington CMA    Health Maintenance Due   Topic Date Due     PREVENTIVE CARE VISIT  1970     COLONOSCOPY  03/05/1978     HIV SCREENING  03/05/1983     ZOSTER IMMUNIZATION (1 of 2) 03/05/2018     INFLUENZA VACCINE (1) 09/01/2019     Health Maintenance reviewed at today's visit patient asked to schedule/complete:   Routine Health Visit: Patient agrees to schedule  Colon Cancer:  Patient agrees to schedule  Immunizations:  Patient agrees to schedule    "

## 2019-09-13 ASSESSMENT — ANXIETY QUESTIONNAIRES: GAD7 TOTAL SCORE: 9

## 2019-09-27 ENCOUNTER — TRANSFERRED RECORDS (OUTPATIENT)
Dept: HEALTH INFORMATION MANAGEMENT | Facility: CLINIC | Age: 49
End: 2019-09-27

## 2019-10-10 ENCOUNTER — OFFICE VISIT (OUTPATIENT)
Dept: FAMILY MEDICINE | Facility: CLINIC | Age: 49
End: 2019-10-10
Payer: COMMERCIAL

## 2019-10-10 ENCOUNTER — NURSE TRIAGE (OUTPATIENT)
Dept: FAMILY MEDICINE | Facility: CLINIC | Age: 49
End: 2019-10-10

## 2019-10-10 VITALS
DIASTOLIC BLOOD PRESSURE: 80 MMHG | HEIGHT: 73 IN | OXYGEN SATURATION: 97 % | BODY MASS INDEX: 41.75 KG/M2 | RESPIRATION RATE: 12 BRPM | TEMPERATURE: 97.7 F | SYSTOLIC BLOOD PRESSURE: 138 MMHG | HEART RATE: 57 BPM | WEIGHT: 315 LBS

## 2019-10-10 DIAGNOSIS — R53.83 FATIGUE, UNSPECIFIED TYPE: Primary | ICD-10-CM

## 2019-10-10 DIAGNOSIS — F41.1 ANXIETY STATE: ICD-10-CM

## 2019-10-10 DIAGNOSIS — F32.5 MAJOR DEPRESSION IN COMPLETE REMISSION (H): ICD-10-CM

## 2019-10-10 DIAGNOSIS — R73.01 IMPAIRED FASTING GLUCOSE: ICD-10-CM

## 2019-10-10 DIAGNOSIS — E66.01 MORBID OBESITY DUE TO EXCESS CALORIES (H): ICD-10-CM

## 2019-10-10 DIAGNOSIS — G47.00 PERSISTENT INSOMNIA: ICD-10-CM

## 2019-10-10 DIAGNOSIS — G47.33 OBSTRUCTIVE SLEEP APNEA SYNDROME: ICD-10-CM

## 2019-10-10 DIAGNOSIS — I10 UNCONTROLLED HYPERTENSION: ICD-10-CM

## 2019-10-10 DIAGNOSIS — Z83.3 FAMILY HISTORY OF DIABETES MELLITUS: ICD-10-CM

## 2019-10-10 PROCEDURE — 99214 OFFICE O/P EST MOD 30 MIN: CPT | Performed by: FAMILY MEDICINE

## 2019-10-10 RX ORDER — METOPROLOL SUCCINATE 25 MG/1
25 TABLET, EXTENDED RELEASE ORAL DAILY
Qty: 90 TABLET | Refills: 0 | Status: SHIPPED | OUTPATIENT
Start: 2019-10-10 | End: 2019-11-19

## 2019-10-10 RX ORDER — METOPROLOL SUCCINATE 100 MG/1
100 TABLET, EXTENDED RELEASE ORAL DAILY
Qty: 90 TABLET | Refills: 0 | Status: SHIPPED | OUTPATIENT
Start: 2019-10-10 | End: 2020-01-08

## 2019-10-10 ASSESSMENT — ANXIETY QUESTIONNAIRES
IF YOU CHECKED OFF ANY PROBLEMS ON THIS QUESTIONNAIRE, HOW DIFFICULT HAVE THESE PROBLEMS MADE IT FOR YOU TO DO YOUR WORK, TAKE CARE OF THINGS AT HOME, OR GET ALONG WITH OTHER PEOPLE: VERY DIFFICULT
7. FEELING AFRAID AS IF SOMETHING AWFUL MIGHT HAPPEN: NOT AT ALL
GAD7 TOTAL SCORE: 4
6. BECOMING EASILY ANNOYED OR IRRITABLE: MORE THAN HALF THE DAYS
5. BEING SO RESTLESS THAT IT IS HARD TO SIT STILL: NOT AT ALL
3. WORRYING TOO MUCH ABOUT DIFFERENT THINGS: SEVERAL DAYS
2. NOT BEING ABLE TO STOP OR CONTROL WORRYING: NOT AT ALL
1. FEELING NERVOUS, ANXIOUS, OR ON EDGE: SEVERAL DAYS

## 2019-10-10 ASSESSMENT — MIFFLIN-ST. JEOR: SCORE: 2410.29

## 2019-10-10 ASSESSMENT — PATIENT HEALTH QUESTIONNAIRE - PHQ9
SUM OF ALL RESPONSES TO PHQ QUESTIONS 1-9: 3
5. POOR APPETITE OR OVEREATING: NOT AT ALL

## 2019-10-10 NOTE — NURSING NOTE
"Chief Complaint   Patient presents with     Fatigue     There were no vitals taken for this visit. Estimated body mass index is 43.93 kg/m  as calculated from the following:    Height as of 9/12/19: 1.854 m (6' 1\").    Weight as of 9/12/19: 151 kg (333 lb).  BP completed using cuff size: renetta Harrington CMA    Health Maintenance Due   Topic Date Due     PREVENTIVE CARE VISIT  1970     HIV SCREENING  03/05/1983     PNEUMOCOCCAL IMMUNIZATION 19-64 MEDIUM RISK (1 of 1 - PPSV23) 03/05/1987     INFLUENZA VACCINE (1) 09/01/2019     ZOSTER IMMUNIZATION (2 of 2) 10/16/2019     Health Maintenance reviewed at today's visit patient asked to schedule/complete:   Routine Health Visit: Patient agrees to schedule  Immunizations:  Patient agrees to schedule    "

## 2019-10-10 NOTE — PATIENT INSTRUCTIONS
1. You are on 40mgm lisinopril   And metoprolol   chek to see if it is metoprolol succinate ie XL lasting 24 hrs or is it   Metoprolol tartrate which last s only 12 hr    We need to consider increasing to 150mgm a day or twice a day    Call me re what you have now     We need to chek the BPs on whatever you are on , 3-4 weeks after on it     2.  Weight Loss Tips  1. Do not eat after 6 hrs before your expected bedtime  2. Have your heaviest meal for breakfast, a slightly lighter meal at lunch and a snack 6 hrs before bed  3. No sugar/calorie drinks except milk ie no fruit juice, pop, alcohol.  4. Drink milk 30min before meals to decrease your hunger. Also it is excellent as part of your last meal of the day snack  5. Drink lots of water  6. Increase fiber in diet: all bran cereal, salads, popcorn etc  7. Have only one small serving of fruit a day about 1/2 cup (as this is high in sugar)  8. EXERCISE is the bottom line. Without it, you will gain weight even on a low calorie diet. Best if done 2-3X a day as can    Being overweight contributes to high blood pressure and high cholesterol, both of which cause heart attacks, strokes and kidney failure, prediabetes and diabetes, arthritis, and liver disease     3. To decrease your blood pressure:     1) decrease your salt in your diet   2) increase greens   3) decrease red meat   4) increase fiber in diet   5) increase exercise   6) lose weight (if you are over weight )

## 2019-10-10 NOTE — PROGRESS NOTES
"Subjective     Homer Wells is a 49 year old male who presents to clinic today for the following health issues:    HPI     Fatigue      Duration: x 1 week    Description (location/character/radiation): Feeling Tired and Exhausted    -onset with gthick green nasal discharge one am but no other symptoms     Intensity:  moderate    Accompanying signs and symptoms: None    History (similar episodes/previous evaluation): None    Precipitating or alleviating factors: None    Therapies tried and outcome: Rest/No Relief    Hypertension Follow-Up      Do you check your blood pressure regularly outside of the clinic? Yes     Much better now with only systolic hi at 150 on  2nd wk of meds and has quit alcohol     Are you following a low salt diet? No    Are your blood pressures ever more than 140 on the top number (systolic) OR more   than 90 on the bottom number (diastolic), for example 140/90? Yes   3 weeks ago inceased thelisinopril to 40 mgm and BP is comning down   HR is 65-70       Depression and Anxiety Follow-Up      How are you doing with your depression since your last visit? Feels better     How are you doing with your anxiety since your last visit?              \"         \"      Are you having other symptoms that might be associated with depression or anxiety? No    Have you had a significant life event? No     Do you have any concerns with your use of alcohol or other drugs? Has stopped the alcohol for 6 d now     Social History     Tobacco Use     Smoking status: Former Smoker     Last attempt to quit: 1999     Years since quittin.7     Smokeless tobacco: Former User   Substance Use Topics     Alcohol use: Yes     Drug use: No     PHQ 2018            9-12-19  10-10   PHQ-9 Total Score 5 15 11                      7          3   Q9: Thoughts of better off dead/self-harm past 2 weeks Not at all Not at all Not at all   Some encounter information is confidential and restricted. Go to " Review Flowsheets activity to see all data.     JUAN C-7 SCORE 5/4/2018 5/31/2018 8/20/2019   Total Score 9 2 9                            9        4     Some encounter information is confidential and restricted. Go to Review Flowsheets activity to see all data.     ALCOHOL Abuse      - q d use since teens at 4 / d and now none to a feew     -going to AA      - positive atituede    -if stays off, will help the HTN, glu intol, wt, and thus the low oxygen, VONNIE     staqtes ;none for 3 weeks     MORBID OBESITY      --BMI = 44    - -sedentary life style     Except for work     -comorbid hi glu, depression , VONNIE, hypoxia , HTN     Insomnia      Duration: lifelong     Description  Frequency of insomnia:  nightly  Time to fall asleep: 1 hour  Middle of night awakening:  nightly  Early morning awakening:  nightly    Accompanying signs and symptoms:  depression/mood changes and q d alcohol     History  Similar episodes in past:  YES  Previous evaluation/sleep study:  YES- has VONNIE     Precipitating or alleviating factors:  New stressful situation: no   Caffeine intake after lunchtime: no   OTC decongestants: no   Any new medications: no   Therapies tried and outcome: trazodone 50mgm and sleeps the nite     VONNIE AND HYPOXIA       Oximetry 95%     -obese    c PAP     Glucose Intolerance Follow-up      How often are you checking your blood sugar? Not at all    What time of day are you checking your blood sugars (select all that apply)?      Have you had any blood sugars above 200?  No    Have you had any blood sugars below 70?  No    What symptoms do you notice when your blood sugar is low?  None    What concerns do you have today about your diabetes? None     Do you have any of these symptoms? (Select all that apply)  No numbness or tingling in feet.  No redness, sores or blisters on feet.  No complaints of excessive thirst.  No reports of blurry vision.  No significant changes to weight.     Have you had a diabetic eye exam in the  "last 12 months? No     Hi FBS     fam hx DM     BP Readings from Last 2 Encounters:   09/12/19 (!) 150/80  = same today    08/20/19 (!) 158/116     LDL Cholesterol Calculated (mg/dL)   Date Value   06/04/2015 86     LDL Cholesterol Direct (mg/dL)   Date Value   06/13/2013 126   Diabetes Management Resources      How many servings of fruits and vegetables do you eat daily?  2-3    On average, how many sweetened beverages do you drink each day (soda, juice, sweet tea, etc)?   0    How many days per week do you miss taking your medication? 0              Reviewed and updated as needed this visit by Provider  Tobacco  Allergies  Meds  Problems  Med Hx  Surg Hx  Fam Hx         Review of Systems   ROS COMP: CONSTITUTIONAL: NEGATIVE for fever, chills, change in weight  POS  Fatigue for 7 d   INTEGUMENTARY/SKIN: NEGATIVE for worrisome rashes, moles or lesions  EYES: NEGATIVE for vision changes or irritation  ENT/MOUTH: NEGATIVE for ear, mouth and throat problems  RESP: NEGATIVE for significant cough or SOB  BREAST: NEGATIVE for masses, tenderness or discharge  CV: NEGATIVE for chest pain, palpitations or peripheral edema  GI: NEGATIVE for nausea, abdominal pain, heartburn, or change in bowel habits  : NEGATIVE for frequency, dysuria, or hematuria  MUSCULOSKELETAL: NEGATIVE for significant arthralgias or myalgia  NEURO: NEGATIVE for weakness, dizziness or paresthesias  ENDOCRINE: NEGATIVE for temperature intolerance, skin/hair changes  HEME: NEGATIVE for bleeding problems  PSYCHIATRIC: NEGATIVE for changes in mood or affect      Objective    /80   Pulse 57   Temp 97.7  F (36.5  C) (Tympanic)   Resp 12   Ht 1.854 m (6' 1\")   Wt (!) 150.1 kg (331 lb)   SpO2 97%   BMI 43.67 kg/m    Body mass index is 43.67 kg/m .  Physical Exam   GENERAL: healthy, alert, no distress and obese  EYES: Eyes grossly normal to inspection, PERRL and conjunctivae and sclerae normal  RESP: lungs clear to auscultation - no rales, " "rhonchi or wheezes  CV: regular rate and rhythm, normal S1 S2, no S3 or S4, no murmur, click or rub, no peripheral edema and peripheral pulses strong  MS: no gross musculoskeletal defects noted, no edema  SKIN: no suspicious lesions or rashes  NEURO: Normal strength and tone, mentation intact and speech normal  PSYCH: mentation appears normal, affect normal/bright    Diagnostic Test Results:  Labs reviewed in Epic  Results for orders placed or performed in visit on 08/20/19   Prostate spec antigen screen   Result Value Ref Range    PSA 0.71 0 - 4 ug/L   Uric acid   Result Value Ref Range    Uric Acid 4.7 3.5 - 7.2 mg/dL   ALT   Result Value Ref Range    ALT 40 0 - 70 U/L   AST   Result Value Ref Range    AST 36 0 - 45 U/L   Glucose   Result Value Ref Range    Glucose 93 70 - 99 mg/dL           Assessment & Plan       ICD-10-CM    1. Fatigue, unspecified type since end of 9-19 R53.83    2. Uncontrolled hypertension-though better on increased lisinopril  I10    3. Anxiety state F41.1    4. Major depression in complete remission (H) F32.5    5. Obstructive sleep apnea syndrome G47.33    6. Morbid obesity due to excess calories (H) E66.01    7. Impaired fasting glucose R73.01    8. Family history of diabetes mellitus Z83.3    9. Persistent insomnia-resolved on trazodone  G47.00         BMI:   Estimated body mass index is 43.67 kg/m  as calculated from the following:    Height as of this encounter: 1.854 m (6' 1\").    Weight as of this encounter: 150.1 kg (331 lb).   Weight management plan: Discussed healthy diet and exercise guidelines        Patient Instructions   1. You are on 40mgm lisinopril   And metoprolol   chek to see if it is metoprolol succinate ie XL lasting 24 hrs or is it   Metoprolol tartrate which last s only 12 hr    We need to consider increasing to 150mgm a day or twice a day    Call me re what you have now     We need to chek the BPs on whatever you are on , 3-4 weeks after on it     2.  Weight Loss " Tips  1. Do not eat after 6 hrs before your expected bedtime  2. Have your heaviest meal for breakfast, a slightly lighter meal at lunch and a snack 6 hrs before bed  3. No sugar/calorie drinks except milk ie no fruit juice, pop, alcohol.  4. Drink milk 30min before meals to decrease your hunger. Also it is excellent as part of your last meal of the day snack  5. Drink lots of water  6. Increase fiber in diet: all bran cereal, salads, popcorn etc  7. Have only one small serving of fruit a day about 1/2 cup (as this is high in sugar)  8. EXERCISE is the bottom line. Without it, you will gain weight even on a low calorie diet. Best if done 2-3X a day as can    Being overweight contributes to high blood pressure and high cholesterol, both of which cause heart attacks, strokes and kidney failure, prediabetes and diabetes, arthritis, and liver disease     3. To decrease your blood pressure:     1) decrease your salt in your diet   2) increase greens   3) decrease red meat   4) increase fiber in diet   5) increase exercise   6) lose weight (if you are over weight )       the FATIGUE    COULD E FROM no longer drinking, obesity , the toprol but we have not increased this   Which we will do now to 150mgm XL     Return in about 1 month (around 11/10/2019) for BP Recheck.    Liliana Mattson MD  [facility]

## 2019-10-10 NOTE — TELEPHONE ENCOUNTER
"Call from patient this afternoon states he has been feeling tired and exhausted over the last week even though he has been getting >8 hours of sleep each night. He had to leave work early today because he was feeling so tired and felt like he could not continue. Coming to clinic today for OV.     No other symptoms noted.     He was scheduled for OV today 2:20pm    Reason for Disposition    [1] MODERATE weakness (i.e., interferes with work, school, normal activities) AND [2] cause unknown  (Exceptions: weakness with acute minor illness, or weakness from poor fluid intake)    Additional Information    Negative: Severe difficulty breathing (e.g., struggling for each breath, speaks in single words)    Negative: Shock suspected (e.g., cold/pale/clammy skin, too weak to stand, low BP, rapid pulse)    Negative: Difficult to awaken or acting confused (e.g., disoriented, slurred speech)    Negative: [1] Fainted > 15 minutes ago AND [2] still feels too weak or dizzy to stand    Negative: [1] SEVERE weakness (i.e., unable to walk or barely able to walk, requires support) AND     [2] new onset or worsening    Negative: Sounds like a life-threatening emergency to the triager    Negative: Weakness of the face, arm or leg on one side of the body    Negative: [1] Has diabetes (diabetes mellitus) AND [2] weakness from low blood sugar (i.e., < 60 mg/dl or 3.5 mmol/l)    Negative: Heat exhaustion suspected (i.e., dehydration from heat exposure)    Negative: Vomiting is main symptom    Negative: Diarrhea is main symptom    Negative: Difficulty breathing    Negative: Heart beating < 50 beats per minute OR > 140 beats per minute    Negative: Extra heart beats OR irregular heart beating   (i.e., \"palpitations\")    Negative: Follows bleeding (e.g., from vomiting, rectum, vagina; EXCEPTION: small brief weakness from sight of a small amount blood)    Negative: Black or tarry bowel movements    Answer Assessment - Initial Assessment " "Questions  1. DESCRIPTION: \"Describe how you are feeling.\"      Tired, exhausted   2. SEVERITY: \"How bad is it?\"  \"Can you stand and walk?\"    - MILD - Feels weak or tired, but does not interfere with work, school or normal activities    - MODERATE - Able to stand and walk; weakness interferes with work, school, or normal activities    - SEVERE - Unable to stand or walk      Moderate, had to leave work today because too tired   3. ONSET:  \"When did the weakness begin?\"      1 week  4. CAUSE: \"What do you think is causing the weakness?\"      States overweight and has high blood pressure.   5. MEDICINES: \"Have you recently started a new medicine or had a change in the amount of a medicine?\"      none  6. OTHER SYMPTOMS: \"Do you have any other symptoms?\" (e.g., chest pain, fever, cough, SOB, vomiting, diarrhea, bleeding, other areas of pain)      none    Protocols used: WEAKNESS (GENERALIZED) AND FATIGUE-A-AH      "

## 2019-10-11 ASSESSMENT — ANXIETY QUESTIONNAIRES: GAD7 TOTAL SCORE: 4

## 2019-10-15 ENCOUNTER — TELEPHONE (OUTPATIENT)
Dept: FAMILY MEDICINE | Facility: CLINIC | Age: 49
End: 2019-10-15

## 2019-10-15 DIAGNOSIS — R53.83 FATIGUE, UNSPECIFIED TYPE: ICD-10-CM

## 2019-10-15 DIAGNOSIS — R82.998 DARK URINE: Primary | ICD-10-CM

## 2019-10-15 DIAGNOSIS — R34 DECREASED URINATION: ICD-10-CM

## 2019-10-15 NOTE — TELEPHONE ENCOUNTER
Call from patient today    States he has an update on his symptoms after meeting with provider 10/10/2019.     During OV talked about extreme fatigue. States this is still ongoing, almost 2 weeks now and seems to have trouble walking and getting up and moving because of it.     Also noted this his urine is dark and concentrated when he uses the bathroom. Also states that his urine bubbles in the toilet when he uses that bathroom for a couple seconds.     Not Urinating as often even through he has increase his water fluid intake greatly within the last couple of days. He does not know exactly how much he is drinking, but it is consistent throughout the day.     Is there any follow up testing/labs provider would like to to at this time?

## 2019-10-16 ENCOUNTER — TELEPHONE (OUTPATIENT)
Dept: FAMILY MEDICINE | Facility: CLINIC | Age: 49
End: 2019-10-16

## 2019-10-16 ENCOUNTER — TRANSFERRED RECORDS (OUTPATIENT)
Dept: HEALTH INFORMATION MANAGEMENT | Facility: CLINIC | Age: 49
End: 2019-10-16

## 2019-10-16 NOTE — TELEPHONE ENCOUNTER
I called the patient with his abnormal lab test results.  His AST is 961, his ALT is 1205, and his bilirubin is 14.  I suggested since he lives in [place] that he go to the emergency room at [facility] to be evaluated.  He tells me that he has stopped drinking alcohol which was noted in the last 2 notes by Dr. Mattson.  In August his liver tests were normal.  He is agreeable with the plan.

## 2019-10-17 ENCOUNTER — TELEPHONE (OUTPATIENT)
Dept: FAMILY MEDICINE | Facility: CLINIC | Age: 49
End: 2019-10-17

## 2019-10-17 LAB
ALT SERPL-CCNC: 1.07 IU/L (ref 12–68)
AST SERPL-CCNC: 835 IU/L (ref 12–37)
CREAT SERPL-MCNC: 1.35 MG/DL (ref 0.7–1.3)
GFR SERPL CREATININE-BSD FRML MDRD: 56 ML/MIN
INR PPP: 1.5 (ref 1–1.2)
POTASSIUM SERPL-SCNC: 4.2 MMOL/L (ref 3.5–5.1)

## 2019-10-17 NOTE — TELEPHONE ENCOUNTER
Call made back to the patient to ask why he needs a gastroenterology referral.    It is for a follow up to his hospitalization for acute alcoholic hepatitis.     Routing to provider for referral.

## 2019-10-17 NOTE — TELEPHONE ENCOUNTER
Reason for Call:  Other call back    Detailed comments: Homer was recently in the ER and was told to see a Gastroenterologist. He is requesting Dr. DANIELA Mattson give him a referral.    Phone Number Patient can be reached at: Cell number on file:    Telephone Information:   Mobile [phone]       Best Time: any    Can we leave a detailed message on this number? YES    Call taken on 10/17/2019 at 11:06 AM by Macie Manrique

## 2019-10-18 NOTE — TELEPHONE ENCOUNTER
He was admitted on 10-16-19   They will either refer him from the hospital   OR  More likely ,,,,,he needs to return here in follow up of the admission hepatitis and his other medical problems BEFORE referral to GI

## 2019-10-19 DIAGNOSIS — M10.9 GOUT, UNSPECIFIED CAUSE, UNSPECIFIED CHRONICITY, UNSPECIFIED SITE: ICD-10-CM

## 2019-10-19 NOTE — TELEPHONE ENCOUNTER
"Requested Prescriptions   Pending Prescriptions Disp Refills     allopurinol (ZYLOPRIM) 300 MG tablet [Pharmacy Med Name: Allopurinol Oral Tablet 300 MG]    Last Written Prescription Date:  02/25/2019  Last Fill Quantity: 30 tablet,  # refills: 5   Last office visit: 10/10/2019 with prescribing provider:  DANIELA Mattson   Future Office Visit:     Next 5 appointments (look out 90 days)    Oct 22, 2019  7:40 AM CDT  PHYSICAL with Liliana Mattson MD  [facility] ([facility]) [address] [place] [id number]  [phone]          30 tablet 4     Sig: TAKE ONE TABLET BY MOUTH ONE TIME DAILY       Gout Agents Protocol Passed - 10/19/2019  7:03 AM        Passed - CBC on file in past 12 months     Recent Labs   Lab Test 10/15/19  1557   WBC 5.5   RBC 5.25   HGB 15.1   HCT 42.7                    Passed - ALT on file in past 12 months     Recent Labs   Lab Test 10/15/19  1557   ALT 1,205*             Passed - Has Uric Acid on file in past 12 months and value is less than 6     Recent Labs   Lab Test 08/20/19  1603   URIC 4.7     If level is 6mg/dL or greater, ok to refill one time and refer to provider.           Passed - Recent (12 mo) or future (30 days) visit within the authorizing provider's specialty     Patient has had an office visit with the authorizing provider or a provider within the authorizing providers department within the previous 12 mos or has a future within next 30 days. See \"Patient Info\" tab in inbasket, or \"Choose Columns\" in Meds & Orders section of the refill encounter.              Passed - Medication is active on med list        Passed - Patient is age 18 or older        Passed - Normal serum creatinine on file in the past 12 months     Recent Labs   Lab Test 10/15/19  1557   CR 0.75                "

## 2019-10-22 RX ORDER — ALLOPURINOL 300 MG/1
TABLET ORAL
Qty: 90 TABLET | Refills: 2 | Status: SHIPPED | OUTPATIENT
Start: 2019-10-22 | End: 2022-06-21

## 2019-10-22 NOTE — TELEPHONE ENCOUNTER
Prescription approved per [facility] Protocol for 12 months of refills since last appointment, which was 10/10/2019.

## 2019-11-04 DIAGNOSIS — I10 BENIGN ESSENTIAL HYPERTENSION: ICD-10-CM

## 2019-11-04 RX ORDER — LISINOPRIL 40 MG/1
40 TABLET ORAL DAILY
Qty: 90 TABLET | Refills: 3 | OUTPATIENT
Start: 2019-11-04

## 2019-11-04 NOTE — TELEPHONE ENCOUNTER
"Requested Prescriptions   Pending Prescriptions Disp Refills     lisinopril (PRINIVIL/ZESTRIL) 40 MG tablet  Last Written Prescription Date:  7/22/2019  Last Fill Quantity: 90 tablet,  # refills: 0   Last office visit: 10/10/2019 with prescribing provider:  DANIELA Mattson   Future Office Visit:     90 tablet 0     Sig: Take 1 tablet (40 mg) by mouth daily       ACE Inhibitors (Including Combos) Protocol Failed - 11/4/2019  8:35 AM        Failed - Normal serum creatinine on file in past 12 months     Recent Labs   Lab Test 10/17/19   CR 1.35*             Passed - Blood pressure under 140/90 in past 12 months     BP Readings from Last 3 Encounters:   10/10/19 138/80   09/12/19 (!) 150/80   08/20/19 (!) 158/116                 Passed - Recent (12 mo) or future (30 days) visit within the authorizing provider's specialty     Patient has had an office visit with the authorizing provider or a provider within the authorizing providers department within the previous 12 mos or has a future within next 30 days. See \"Patient Info\" tab in inbasket, or \"Choose Columns\" in Meds & Orders section of the refill encounter.              Passed - Medication is active on med list        Passed - Patient is age 18 or older        Passed - Normal serum potassium on file in past 12 months     Recent Labs   Lab Test 10/17/19   POTASSIUM 4.2                "

## 2019-11-04 NOTE — TELEPHONE ENCOUNTER
Routing refill request to provider for review/approval because:  Labs out of range:  MARISELA OntiverosN, RN  [employer] [facility]

## 2019-11-19 ENCOUNTER — OFFICE VISIT (OUTPATIENT)
Dept: FAMILY MEDICINE | Facility: CLINIC | Age: 49
End: 2019-11-19
Payer: COMMERCIAL

## 2019-11-19 VITALS
TEMPERATURE: 97.3 F | SYSTOLIC BLOOD PRESSURE: 138 MMHG | BODY MASS INDEX: 41.75 KG/M2 | DIASTOLIC BLOOD PRESSURE: 80 MMHG | HEART RATE: 62 BPM | OXYGEN SATURATION: 97 % | RESPIRATION RATE: 18 BRPM | WEIGHT: 315 LBS | HEIGHT: 73 IN

## 2019-11-19 DIAGNOSIS — Z83.3 FAMILY HISTORY OF DIABETES MELLITUS: ICD-10-CM

## 2019-11-19 DIAGNOSIS — G47.00 PERSISTENT INSOMNIA: ICD-10-CM

## 2019-11-19 DIAGNOSIS — M1A.40X0 OTHER SECONDARY CHRONIC GOUT WITHOUT TOPHUS, UNSPECIFIED SITE: ICD-10-CM

## 2019-11-19 DIAGNOSIS — K70.10 ACUTE ALCOHOLIC HEPATITIS (H): Primary | ICD-10-CM

## 2019-11-19 DIAGNOSIS — R73.01 IMPAIRED FASTING GLUCOSE: ICD-10-CM

## 2019-11-19 DIAGNOSIS — F10.10 ALCOHOL ABUSE: ICD-10-CM

## 2019-11-19 DIAGNOSIS — I10 BENIGN ESSENTIAL HYPERTENSION: ICD-10-CM

## 2019-11-19 DIAGNOSIS — R17 JAUNDICE: ICD-10-CM

## 2019-11-19 DIAGNOSIS — F41.1 ANXIETY STATE: ICD-10-CM

## 2019-11-19 DIAGNOSIS — F32.5 MAJOR DEPRESSION IN COMPLETE REMISSION (H): ICD-10-CM

## 2019-11-19 DIAGNOSIS — E66.01 MORBID OBESITY DUE TO EXCESS CALORIES (H): ICD-10-CM

## 2019-11-19 DIAGNOSIS — R74.8 ELEVATED LIVER ENZYMES: ICD-10-CM

## 2019-11-19 DIAGNOSIS — Z13.220 LIPID SCREENING: ICD-10-CM

## 2019-11-19 PROCEDURE — 99496 TRANSJ CARE MGMT HIGH F2F 7D: CPT | Performed by: FAMILY MEDICINE

## 2019-11-19 PROCEDURE — 80061 LIPID PANEL: CPT | Performed by: FAMILY MEDICINE

## 2019-11-19 PROCEDURE — 84550 ASSAY OF BLOOD/URIC ACID: CPT | Performed by: FAMILY MEDICINE

## 2019-11-19 PROCEDURE — 36415 COLL VENOUS BLD VENIPUNCTURE: CPT | Performed by: FAMILY MEDICINE

## 2019-11-19 PROCEDURE — 80076 HEPATIC FUNCTION PANEL: CPT | Performed by: FAMILY MEDICINE

## 2019-11-19 RX ORDER — LISINOPRIL 40 MG/1
40 TABLET ORAL DAILY
Qty: 90 TABLET | Refills: 1 | Status: SHIPPED | OUTPATIENT
Start: 2019-11-19 | End: 2020-07-08

## 2019-11-19 ASSESSMENT — ANXIETY QUESTIONNAIRES

## 2019-11-19 ASSESSMENT — PATIENT HEALTH QUESTIONNAIRE - PHQ9
SUM OF ALL RESPONSES TO PHQ QUESTIONS 1-9: 4
5. POOR APPETITE OR OVEREATING: NOT AT ALL

## 2019-11-19 ASSESSMENT — MIFFLIN-ST. JEOR: SCORE: 2360.39

## 2019-11-19 NOTE — NURSING NOTE
"Chief Complaint   Patient presents with     RECHECK     There were no vitals taken for this visit. Estimated body mass index is 43.67 kg/m  as calculated from the following:    Height as of 10/10/19: 1.854 m (6' 1\").    Weight as of 10/10/19: 150.1 kg (331 lb).  BP completed using cuff size: large   Fanta Harrington, [facility]    Health Maintenance Due   Topic Date Due     PREVENTIVE CARE VISIT  1970     HIV SCREENING  03/05/1983     PNEUMOCOCCAL IMMUNIZATION 19-64 MEDIUM RISK (1 of 1 - PPSV23) 03/05/1987     INFLUENZA VACCINE (1) 09/01/2019     ZOSTER IMMUNIZATION (2 of 2) 10/16/2019     Health Maintenance reviewed at today's visit patient asked to schedule/complete:   Routine Health Visit: Patient agrees to schedule  Immunizations:  Patient agrees to schedule    "

## 2019-11-19 NOTE — LETTER
[facility]  [address]  [place] [address]  [phone]                                                                                                           Homer Wells  [address]  [place] [address]    November 19, 2019      Dear Homer,    Seen by me today.    You should stay off work with RTW on MOnday, NOv 25, 2019    Thank you for choosing [facility].  We appreciate the opportunity to serve you and look forward to supporting your healthcare needs in the future.    If you have any questions or concerns, please call me or my staff at [phone].      Sincerely,    Liliana Mattson MD

## 2019-11-19 NOTE — PATIENT INSTRUCTIONS
1.  Weight Loss Tips  1. Do not eat after 6 hrs before your expected bedtime  2. Have your heaviest meal for breakfast, a slightly lighter meal at lunch and a snack 6 hrs before bed  3. No sugar/calorie drinks except milk ie no fruit juice, pop, alcohol.  4. Drink milk 30min before meals to decrease your hunger. Also it is excellent as part of your last meal of the day snack  5. Drink lots of water  6. Increase fiber in diet: all bran cereal, salads, popcorn etc  7. Have only one small serving of fruit a day about 1/2 cup (as this is high in sugar)  8. EXERCISE is the bottom line. Without it, you will gain weight even on a low calorie diet. Best if done 2-3X a day as can    Being overweight contributes to high blood pressure and high cholesterol, both of which cause heart attacks, strokes and kidney failure, prediabetes and diabetes, arthritis, and liver disease     You must also decrease your caloric intake and especially decrease the carbs or carbohydrates as these are the most harmful regarding the above health risks     Good work!!! Keep on losing wt!!!   Especially staying off the alcohol !!!

## 2019-11-19 NOTE — PROGRESS NOTES
"Subjective     Homer Wells is a 49 year old male who presents to clinic today for the following health issues:    HPI     Hospital Follow-up Visit:    Hospital/Nursing Home/IP Rehab Facility: [facility]  Date of Admission: 10/16/2019  Date of Discharge: 11/18a/2019  Reason(s) for Admission: Acute Alcoholic Hepatitis            Problems taking medications regularly:  None       Medication changes since discharge: None       Problems adhering to non-medication therapy:  None    Summary of hospitalization:  [facility] discharge summary reviewed  Diagnostic Tests/Treatments reviewed.  Follow up needed: of high LFTs   Other Healthcare Providers Involved in Patient s Care:         AA and post rehab   Update since discharge: improved.     Post Discharge Medication Reconciliation: discharge medications reconciled, continue medications without change.  Plan of care communicated with patient     Coding guidelines for this visit:  Type of Medical   Decision Making Face-to-Face Visit       within 7 Days of discharge Face-to-Face Visit        within 14 days of discharge   Moderate Complexity [id number]   High Complexity [id number]        ALCOHOL ABUSE  Caused ALCOHOLIC HEPATITIS with ELEVATED LFTs -1000 +       - q d use since teens at 4 / [age] and admitted to rehab for a mo -out 11-18-19     -plans to return  to AA      - positive atitude now     -if stays off, will help the HTN, glu intol, wt, and thus the low oxygen, VONNIE     lftS WERE coming downon discharge     Depression and Anxiety Follow-Up      How are you doing with your depression since your last visit? Feels better     How are you doing with your anxiety since your last visit?              \"         \"      Are you having other symptoms that might be associated with depression or anxiety? No    Have you had a significant life event? No     Do you have any concerns with your use of alcohol or other drugs?     Social History     Tobacco Use     " Smoking status: Former Smoker     Last attempt to quit:      Years since quittin.7     Smokeless tobacco: Former User   Substance Use Topics     Alcohol use: Yes     Drug use: No     PHQ 2018            9-12-19  10-10   PHQ-9 Total Score 5 15 11                      7          3   Q9: Thoughts of better off dead/self-harm past 2 weeks Not at all Not at all Not at all   Some encounter information is confidential and restricted. Go to Review Flowsheets activity to see all data.     JUAN C-7 SCORE 2018   Total Score 9 2 9                            9        4     Some encounter information is confidential and restricted. Go to Review Flowsheets activity to see all data.   PHQ= 4 & JUAN C = 0 today     Fatigue      Duration: x 6 weeks    Improving now that liver is recovering and isooff the alcohol     Description (location/character/radiation): Feeling Tired and Exhausted    -onset with gthick green nasal discharge one am but no other symptoms     Intensity:  moderate    Accompanying signs and symptoms: None    History (similar episodes/previous evaluation): None    Precipitating or alleviating factors: None    Therapies tried and outcome: Rest/No Relief    Hypertension Follow-Up      Do you check your blood pressure regularly outside of the clinic? Yes     wnlnow on meds and has quit alcohol     Are you following a low salt diet? No    Are your blood pressures ever more than 140 on the top number (systolic) OR more   than 90 on the bottom number (diastolic), for example 140/90? Yes    MORBID OBESITY      --BMI = 42     -decreased on no alcohol for a mo     - -sedentary life style     Except for work     -comorbid hi glu, depression , VONNIE, hypoxia , HTN     Insomnia      Duration: lifelong     Description  Frequency of insomnia:  nightly  Time to fall asleep: 1 hour  Middle of night awakening:  nightly  Early morning awakening:  nightly    Accompanying signs and  symptoms:  depression/mood changes and q d alcohol     History  Similar episodes in past:  YES  Previous evaluation/sleep study:  YES- has VONNIE     Precipitating or alleviating factors:  New stressful situation: no   Caffeine intake after lunchtime: no   OTC decongestants: no   Any new medications: no   Therapies tried and outcome: trazodone 50mgm and sleeps the nite  But is groggy in th e am     VONNIE AND HYPOXIA       Oximetry 95%     -obese    c PAP     Glucose Intolerance Follow-Up      How often are you checking your blood sugar? Not at all    High FS    What time of day are you checking your blood sugars (select all that apply)?      Have you had any blood sugars above 200?  No    Have you had any blood sugars below 70?  No    What symptoms do you notice when your blood sugar is low?  None    What concerns do you have today about your diabetes? None     Do you have any of these symptoms? (Select all that apply)  No numbness or tingling in feet.  No redness, sores or blisters on feet.  No complaints of excessive thirst.  No reports of blurry vision.  No significant changes to weight.     Have you had a diabetic eye exam in the last 12 months? No     Hi FBS     fam hx DM     BP Readings from Last 2 Encounters:   09/12/19 (!) 150/80  = same today    08/20/19 (!) 158/116     LDL Cholesterol Calculated (mg/dL)   Date Value   06/04/2015 86     LDL Cholesterol Direct (mg/dL)   Date Value   06/13/2013 126   Diabetes Management Resources      How many servings of fruits and vegetables do you eat daily?  2-3    On average, how many sweetened beverages do you drink each day (soda, juice, sweet tea, etc)?   0    How many days per week do you miss taking your medication? 0    Gout  Duration: for several yrs  Related to the alcohol   Description   Location: big toe - bilateral  Joint Swelling: YES  Redness: YES  Pain intensity:  moderate  Accompanying signs and symptoms: None  History  Previous history of gout: YES   Trauma to  "the area: no   Precipitating factors:   Alcohol usage: quit drinking tara ago   Diuretic use: no   Recent illness: no   Therapies tried and outcome: None  Quit alcohol a mo ago   Allopurinol was stopped 3 weeks ago in rehab as might effect the liver and LFTs were hi  Last uric acid a wk ago was wnl per rehab             Reviewed and updated as needed this visit by Provider  Tobacco  Allergies  Meds  Problems  Med Hx  Surg Hx  Fam Hx         Review of Systems   ROS COMP: CONSTITUTIONAL: NEGATIVE for fever, chills, change in weight  INTEGUMENTARY/SKIN: NEGATIVE for worrisome rashes, moles or lesions  EYES: NEGATIVE for vision changes or irritation  ENT/MOUTH: NEGATIVE for ear, mouth and throat problems  RESP: NEGATIVE for significant cough or SOB  BREAST: NEGATIVE for masses, tenderness or discharge  CV: NEGATIVE for chest pain, palpitations or peripheral edema  GI: NEGATIVE for nausea, abdominal pain, heartburn, or change in bowel habits  : NEGATIVE for frequency, dysuria, or hematuria  MUSCULOSKELETAL: NEGATIVE for significant arthralgias or myalgia  NEURO: NEGATIVE for weakness, dizziness or paresthesias  ENDOCRINE: NEGATIVE for temperature intolerance, skin/hair changes  HEME: NEGATIVE for bleeding problems  PSYCHIATRIC: NEGATIVE for changes in mood or affect      Objective    /80   Pulse 62   Temp 97.3  F (36.3  C) (Tympanic)   Resp 18   Ht 1.854 m (6' 1\")   Wt 145.2 kg (320 lb)   SpO2 97%   BMI 42.22 kg/m    Body mass index is 42.22 kg/m .  Physical Exam   GENERAL: healthy, alert, no distress and obese  EYES: Eyes grossly normal to inspection, PERRL and conjunctivae and sclerae normal  RESP: lungs clear to auscultation - no rales, rhonchi or wheezes  CV: regular rate and rhythm, normal S1 S2, no S3 or S4, no murmur, click or rub, no peripheral edema and peripheral pulses strong  MS: no gross musculoskeletal defects noted, no edema  SKIN: no suspicious lesions or rashes POS jaundiced " "conjunctiva and bronze tone toskin   NEURO: Normal strength and tone, mentation intact and speech normal  PSYCH: mentation appears normal, tangential, affect normal/bright, speech pressured and appearance well groomed    Diagnostic Test Results:  Labs reviewed in Epic        Assessment & Plan       ICD-10-CM    1. Acute alcoholic hepatitis K70.10 Hepatic panel     CANCELED: ALT     CANCELED: AST   2. Elevated liver enzymes R74.8    3. Jaundice R17    4. Alcohol abuse since teens -off 1999-2012-restart  F10.10    5. Impaired fasting glucose R73.01    6. Family history of diabetes mellitus Z83.3    7. Benign essential hypertension I10    8. 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 Major depression in complete remission (H) F32.5    10. Anxiety state F41.1    11. Persistent insomnia-resolved on trazodone  G47.00    12. Morbid obesity due to excess calories (H) E66.01 Hepatic panel   13. Lipid screening Z13.220 Lipid panel reflex to direct LDL Fasting        BMI:   Estimated body mass index is 42.22 kg/m  as calculated from the following:    Height as of this encounter: 1.854 m (6' 1\").    Weight as of this encounter: 145.2 kg (320 lb).   Weight management plan: Discussed healthy diet and exercise guidelines        Patient Instructions   1.  Weight Loss Tips  1. Do not eat after 6 hrs before your expected bedtime  2. Have your heaviest meal for breakfast, a slightly lighter meal at lunch and a snack 6 hrs before bed  3. No sugar/calorie drinks except milk ie no fruit juice, pop, alcohol.  4. Drink milk 30min before meals to decrease your hunger. Also it is excellent as part of your last meal of the day snack  5. Drink lots of water  6. Increase fiber in diet: all bran cereal, salads, popcorn etc  7. Have only one small serving of fruit a day about 1/2 cup (as this is high in sugar)  8. EXERCISE is the bottom line. Without it, you will gain weight even on a low calorie diet. " Best if done 2-3X a day as can    Being overweight contributes to high blood pressure and high cholesterol, both of which cause heart attacks, strokes and kidney failure, prediabetes and diabetes, arthritis, and liver disease     You must also decrease your caloric intake and especially decrease the carbs or carbohydrates as these are the most harmful regarding the above health risks     Good work!!! Keep on losing wt!!!   Especially staying off the alcohol !!!           Return in about 2 weeks (around 12/3/2019) for liver, Physical Exam.    Liliana Mattson MD  [facility]

## 2019-11-20 LAB
ALBUMIN SERPL-MCNC: 3.5 G/DL (ref 3.4–5)
ALP SERPL-CCNC: 116 U/L (ref 40–150)
ALT SERPL W P-5'-P-CCNC: 332 U/L (ref 0–70)
AST SERPL W P-5'-P-CCNC: 278 U/L (ref 0–45)
BILIRUB DIRECT SERPL-MCNC: 3.6 MG/DL (ref 0–0.2)
BILIRUB SERPL-MCNC: 4.4 MG/DL (ref 0.2–1.3)
CHOLEST SERPL-MCNC: 122 MG/DL
HDLC SERPL-MCNC: 22 MG/DL
LDLC SERPL CALC-MCNC: 63 MG/DL
NONHDLC SERPL-MCNC: 100 MG/DL
PROT SERPL-MCNC: 6.9 G/DL (ref 6.8–8.8)
TRIGL SERPL-MCNC: 185 MG/DL
URATE SERPL-MCNC: 4.5 MG/DL (ref 3.5–7.2)

## 2019-11-20 ASSESSMENT — ANXIETY QUESTIONNAIRES: GAD7 TOTAL SCORE: 0

## 2019-11-26 ENCOUNTER — OFFICE VISIT (OUTPATIENT)
Dept: FAMILY MEDICINE | Facility: CLINIC | Age: 49
End: 2019-11-26
Payer: COMMERCIAL

## 2019-11-26 VITALS
OXYGEN SATURATION: 96 % | BODY MASS INDEX: 42.42 KG/M2 | SYSTOLIC BLOOD PRESSURE: 132 MMHG | TEMPERATURE: 98.2 F | HEART RATE: 65 BPM | DIASTOLIC BLOOD PRESSURE: 78 MMHG | WEIGHT: 315 LBS

## 2019-11-26 DIAGNOSIS — R74.8 ELEVATED LIVER ENZYMES: ICD-10-CM

## 2019-11-26 DIAGNOSIS — R73.01 IMPAIRED FASTING GLUCOSE: ICD-10-CM

## 2019-11-26 DIAGNOSIS — F10.10 ALCOHOL ABUSE: ICD-10-CM

## 2019-11-26 DIAGNOSIS — Z00.00 ROUTINE GENERAL MEDICAL EXAMINATION AT A HEALTH CARE FACILITY: Primary | ICD-10-CM

## 2019-11-26 DIAGNOSIS — E66.01 MORBID OBESITY DUE TO EXCESS CALORIES (H): ICD-10-CM

## 2019-11-26 DIAGNOSIS — F41.1 ANXIETY STATE: ICD-10-CM

## 2019-11-26 DIAGNOSIS — E78.1 HYPERTRIGLYCERIDEMIA: ICD-10-CM

## 2019-11-26 DIAGNOSIS — K70.10 ACUTE ALCOHOLIC HEPATITIS (H): ICD-10-CM

## 2019-11-26 DIAGNOSIS — M10.9 GOUT, UNSPECIFIED CAUSE, UNSPECIFIED CHRONICITY, UNSPECIFIED SITE: ICD-10-CM

## 2019-11-26 DIAGNOSIS — I10 BENIGN ESSENTIAL HYPERTENSION: ICD-10-CM

## 2019-11-26 DIAGNOSIS — Z83.3 FAMILY HISTORY OF DIABETES MELLITUS: ICD-10-CM

## 2019-11-26 DIAGNOSIS — R09.02 HYPOXIA: ICD-10-CM

## 2019-11-26 LAB — HBA1C MFR BLD: 5.4 % (ref 0–5.6)

## 2019-11-26 PROCEDURE — 83036 HEMOGLOBIN GLYCOSYLATED A1C: CPT | Performed by: FAMILY MEDICINE

## 2019-11-26 PROCEDURE — 99396 PREV VISIT EST AGE 40-64: CPT | Performed by: FAMILY MEDICINE

## 2019-11-26 PROCEDURE — 80076 HEPATIC FUNCTION PANEL: CPT | Performed by: FAMILY MEDICINE

## 2019-11-26 PROCEDURE — 99214 OFFICE O/P EST MOD 30 MIN: CPT | Mod: 25 | Performed by: FAMILY MEDICINE

## 2019-11-26 PROCEDURE — 36415 COLL VENOUS BLD VENIPUNCTURE: CPT | Performed by: FAMILY MEDICINE

## 2019-11-26 NOTE — PATIENT INSTRUCTIONS
Preventive Health Recommendations  Male Ages 50 - 64    Yearly exam:             See your health care provider every year in order to  o   Review health changes.   o   Discuss preventive care.    o   Review your medicines if your doctor has prescribed any.     Have a cholesterol test every 5 years, or more frequently if you are at risk for high cholesterol/heart disease.     Have a diabetes test (fasting glucose) every three years. If you are at risk for diabetes, you should have this test more often.     Have a colonoscopy at age 50, or have a yearly FIT test (stool test). These exams will check for colon cancer.      Talk with your health care provider about whether or not a prostate cancer screening test (PSA) is right for you.    You should be tested each year for STDs (sexually transmitted diseases), if you re at risk.     Shots: Get a flu shot each year. Get a tetanus shot every 10 years.     Nutrition:    Eat at least 5 servings of fruits and vegetables daily.     Eat whole-grain bread, whole-wheat pasta and brown rice instead of white grains and rice.     Get adequate Calcium and Vitamin D.     Lifestyle    Exercise for at least 150 minutes a week (30 minutes a day, 5 days a week). This will help you control your weight and prevent disease.     Limit alcohol to one drink per day.     No smoking.     Wear sunscreen to prevent skin cancer.     See your dentist every six months for an exam and cleaning.     See your eye doctor every 1 to 2 years.    1. Please do your breast exam every mo, when you  Change the  calendar page or set an alarm on your cell phone Do a  visual check for dimples, inversion or indentation or any different position of the nipple Feel manually  for any 1cm or larger  size mass ie about the size of an almond Be sure to cover the entire area of both breasts : this extends back to the back on either side and from the collar bone to the bottom of the breasts where you can begin to feel  ribs.  Men are advised to do this exam also as they now have a higher rate of breast cancer , like women do .       2.  Weight Loss Tips  1. Do not eat after 6 hrs before your expected bedtime  2. Have your heaviest meal for breakfast, a slightly lighter meal at lunch and a snack 6 hrs before bed  3. No sugar/calorie drinks except milk ie no fruit juice, pop, alcohol.  4. Drink milk 30min before meals to decrease your hunger. Also it is excellent as part of your last meal of the day snack  5. Drink lots of water  6. Increase fiber in diet: all bran cereal, salads, popcorn etc  7. Have only one small serving of fruit a day about 1/2 cup (as this is high in sugar)  8. EXERCISE is the bottom line. Without it, you will gain weight even on a low calorie diet. Best if done 2-3X a day as can    Being overweight contributes to high blood pressure and high cholesterol, both of which cause heart attacks, strokes and kidney failure, prediabetes and diabetes, arthritis, and liver disease     You must also decrease your caloric intake and especially decrease the carbs or carbohydrates as these are the most harmful regarding the above health risks

## 2019-11-26 NOTE — LETTER
November 29, 2019      Homer Wells  [address] [place] [address]        Dear ,    We are writing to inform you of your test results.    Please see attached lab results   All of your lab results are normal, except as noted below.     The following are explanations of some of our lab tests     THIS DOES NOT MEAN THAT YOU HAD ALL OF THESE DONE     Please continue on the same medications unless   a change is noted above     These are some general explanations for tests:     Hgb is the blood iron level   WBC means White Blood Cells   Platelets are small blood cells that help with forming the blood clots along with other blood factors.   Electrolytes ar   e Sodium, Potassium, Calcium, Magnesium, Phosphorus.   Liver tests are: AST, ALT, Bilirubin, Alkaline Phosphatase.###though a lot better , still high ###we need to rechek in 2-4 weeks   If not down , you will need further eval of your liver       Kidney tests are Creatinine, GFR.   HDL Cholesterol - is the good cholesterol and it is good to have it high.   LDL cholesterol is the bad ch   olesterol and it is good to have it low.   It is recommended to have LDL less than 130 for people with hypertension and to have it less than 100 for people with heart disease, diabetes and chronic kidney disease.   Triglycerides are another type of lipid a   nd can also cause heart disease so should be kept low.   Thyroid tests are TSH, T4, T3   Glucose is sugar   A1c is a test that gives us an idea about how well was controlled the diabetes for the last 3 months.   PSA stands for Prostate Specific Antigen and    it can be elevated with prostate cancer or prostate inflammation.     Resulted Orders   Hepatic panel   Result Value Ref Range    Bilirubin Direct 2.9 (H) 0.0 - 0.2 mg/dL    Bilirubin Total 3.6 (H) 0.2 - 1.3 mg/dL    Albumin 3.3 (L) 3.4 - 5.0 g/dL    Protein Total 6.7 (L) 6.8 - 8.8 g/dL    Alkaline Phosphatase 123 40 - 150 U/L     (H) 0 - 70  U/L     (H) 0 - 45 U/L   Hemoglobin A1c   Result Value Ref Range    Hemoglobin A1C 5.4 0 - 5.6 %      Comment:      Normal <5.7% Prediabetes 5.7-6.4%  Diabetes 6.5% or higher - adopted from ADA   consensus guidelines.         If you have any questions or concerns, please call the clinic at the number listed above.       Sincerely,        Liliana Mattson MD

## 2019-11-26 NOTE — PROGRESS NOTES
3  SUBJECTIVE:   CC: Homer Wells is an 49 year old male who presents for preventive health visit.     Healthy Habits:    Do you get at least three servings of calcium containing foods daily (dairy, green leafy vegetables, etc.)? yes    Amount of exercise or daily activities, outside of work: none in last month    Problems taking medications regularly No    Medication side effects: No    Have you had an eye exam in the past two years? no    Do you see a dentist twice per year? yes    Do you have sleep apnea, excessive snoring or daytime drowsiness?yes      Pt would like a follow up on liver  And paperwork for short term disability    Today's PHQ-2 Score:   PHQ-2 ( 1999 Pfizer) 11/19/2019 10/10/2019   Q1: Little interest or pleasure in doing things 0 0   Q2: Feeling down, depressed or hopeless 0 0   PHQ-2 Score 0 0       Abuse: Current or Past(Physical, Sexual or Emotional)- No  Do you feel safe in your environment? Yes    Glucose Intolerance   Follow-up      How often are you checking your blood sugar? Not at all    Hi FBS     Could be from the alcohol intake in past     What concerns do you have today about your diabetes? None     Do you have any of these symptoms? (Select all that apply)  No numbness or tingling in feet.  No redness, sores or blisters on feet.  No complaints of excessive thirst.  No reports of blurry vision.  No significant changes to weight.     Have you had a diabetic eye exam in the last 12 months? No    BP Readings from Last 2 Encounters:   11/26/19 132/78   11/19/19 138/80     LDL Cholesterol Calculated (mg/dL)   Date Value   11/19/2019 63   06/04/2015 86       Diabetes Management Resources    Hypertriglyceridemia Follow-Up      Are you regularly taking any medication or supplement to lower your cholesterol?   No    Are you having muscle aches or other side effects that you think could be caused by your cholesterol lowering medication?  No     \just stopped alcohol which could have  caused it         MORBID OBESITY    -BMI = 42   -comorbid hi triglycerides, glu intol , gout , HTN ,   -fairly active  - [employer] on job         Hypertension Follow-up      Do you check your blood pressure regularly outside of the clinic? No     Are you following a low salt diet? No    Are your blood pressures ever more than 140 on the top number (systolic) OR more   than 90 on the bottom number (diastolic), for example 140/90? No        ALCOHOL ABUSE -LIFE LONG   with ELEVATED LFTs     -QUIT LAST MO   -LFTs slowly decreasing   -jaundice now gone today   --abuse since teens       Gout  Duration: yrs but has always been drinking   Description   Location: big toe - bilateral  Joint Swelling: YES  Redness: YES  Pain intensity:  moderate  Accompanying signs and symptoms: None  History  Previous history of gout: no    Trauma to the area: no   Precipitating factors:   Alcohol usage: Frequent  Diuretic use: no   Recent illness: no   Therapies tried and outcome: None         Social History     Tobacco Use     Smoking status: Former Smoker     Last attempt to quit:      Years since quittin.9     Smokeless tobacco: Current User     Types: Snuff   Substance Use Topics     Alcohol use: Yes     If you drink alcohol do you typically have >3 drinks per day or >7 drinks per week? No                      Last PSA:   PSA   Date Value Ref Range Status   2019 0.71 0 - 4 ug/L Final     Comment:     Assay Method:  Chemiluminescence using Siemens Vista analyzer       Reviewed orders with patient. Reviewed health maintenance and updated orders accordingly - Yes  Labs reviewed in EPIC    Reviewed and updated as needed this visit by clinical staff  Allergies  Meds         Reviewed and updated as needed this visit by Provider            ROS:  CONSTITUTIONAL: NEGATIVE for fever, chills, change in weight  INTEGUMENTARY/SKIN: NEGATIVE for worrisome rashes, moles or lesions  EYES: NEGATIVE for vision changes or irritation  ENT:  NEGATIVE for ear, mouth and throat problems  RESP: NEGATIVE for significant cough or SOB  CV: NEGATIVE for chest pain, palpitations or peripheral edema  GI: NEGATIVE for nausea, abdominal pain, heartburn, or change in bowel habits   male: negative for dysuria, hematuria, decreased urinary stream, erectile dysfunction, urethral discharge  MUSCULOSKELETAL: NEGATIVE for significant arthralgias or myalgia  NEURO: NEGATIVE for weakness, dizziness or paresthesias  ENDOCRINE: NEGATIVE for temperature intolerance, skin/hair changes  HEME/ALLERGY/IMMUNE: NEGATIVE for bleeding problems  PSYCHIATRIC: NEGATIVE for changes in mood or affect    OBJECTIVE:   There were no vitals taken for this visit.  EXAM:  GENERAL: healthy, alert, no distress and obese  EYES: Eyes grossly normal to inspection, PERRL and conjunctivae and sclerae normal  NECK: no adenopathy, no asymmetry, masses, or scars and thyroid normal to palpation  RESP: lungs clear to auscultation - no rales, rhonchi or wheezes  CV: regular rate and rhythm, normal S1 S2, no S3 or S4, no murmur, click or rub, no peripheral edema and peripheral pulses strong  ABDOMEN: soft, nontender, no hepatosplenomegaly, no masses and bowel sounds normal  MS: no gross musculoskeletal defects noted, no edema  SKIN: no suspicious lesions or rashes  NEURO: Normal strength and tone, mentation intact and speech normal  PSYCH: mentation appears normal, affect normal/bright  LYMPH: no cervical, supraclavicular, axillary, or inguinal adenopathy    Diagnostic Test Results:  Labs reviewed in Epic  Results for orders placed or performed in visit on 11/26/19   Hepatic panel     Status: Abnormal   Result Value Ref Range    Bilirubin Direct 2.9 (H) 0.0 - 0.2 mg/dL    Bilirubin Total 3.6 (H) 0.2 - 1.3 mg/dL    Albumin 3.3 (L) 3.4 - 5.0 g/dL    Protein Total 6.7 (L) 6.8 - 8.8 g/dL    Alkaline Phosphatase 123 40 - 150 U/L     (H) 0 - 70 U/L     (H) 0 - 45 U/L   Hemoglobin A1c     Status:  "None   Result Value Ref Range    Hemoglobin A1C 5.4 0 - 5.6 %       ASSESSMENT/PLAN:       ICD-10-CM    1. Routine general medical examination at a health care facility Z00.00    2. Acute alcoholic hepatitis K70.10 Hepatic panel   3. Elevated liver enzymes R74.8 Hepatic panel   4. Alcohol abuse since teens -off 1999-2012-restart  F10.10 Hepatic panel   5. Benign essential hypertension I10    6. Impaired fasting glucose R73.01 Hemoglobin A1c   7. Family history of diabetes mellitus Z83.3    8. Hypertriglyceridemia E78.1    9. Gout, unspecified cause, unspecified chronicity, unspecified site M10.9    10. Hypoxia R09.02    11. Morbid obesity due to excess calories (H):BMI 40-50 w comorbid HTN  E66.01 Hepatic panel   12. Anxiety state F41.1        COUNSELING:  Reviewed preventive health counseling, as reflected in patient instructions       Regular exercise       Healthy diet/nutrition    Estimated body mass index is 42.22 kg/m  as calculated from the following:    Height as of 11/19/19: 1.854 m (6' 1\").    Weight as of 11/19/19: 145.2 kg (320 lb).    Weight management plan: Discussed healthy diet and exercise guidelines     reports that he quit smoking about 20 years ago. His smokeless tobacco use includes snuff.     Patient Instructions     Preventive Health Recommendations  Male Ages 50 - 64    Yearly exam:             See your health care provider every year in order to  o   Review health changes.   o   Discuss preventive care.    o   Review your medicines if your doctor has prescribed any.     Have a cholesterol test every 5 years, or more frequently if you are at risk for high cholesterol/heart disease.     Have a diabetes test (fasting glucose) every three years. If you are at risk for diabetes, you should have this test more often.     Have a colonoscopy at age 50, or have a yearly FIT test (stool test). These exams will check for colon cancer.      Talk with your health care provider about whether or not a " prostate cancer screening test (PSA) is right for you.    You should be tested each year for STDs (sexually transmitted diseases), if you re at risk.     Shots: Get a flu shot each year. Get a tetanus shot every 10 years.     Nutrition:    Eat at least 5 servings of fruits and vegetables daily.     Eat whole-grain bread, whole-wheat pasta and brown rice instead of white grains and rice.     Get adequate Calcium and Vitamin D.     Lifestyle    Exercise for at least 150 minutes a week (30 minutes a day, 5 days a week). This will help you control your weight and prevent disease.     Limit alcohol to one drink per day.     No smoking.     Wear sunscreen to prevent skin cancer.     See your dentist every six months for an exam and cleaning.     See your eye doctor every 1 to 2 years.    1. Please do your breast exam every mo, when you  Change the  calendar page or set an alarm on your cell phone Do a  visual check for dimples, inversion or indentation or any different position of the nipple Feel manually  for any 1cm or larger  size mass ie about the size of an almond Be sure to cover the entire area of both breasts : this extends back to the back on either side and from the collar bone to the bottom of the breasts where you can begin to feel ribs.  Men are advised to do this exam also as they now have a higher rate of breast cancer , like women do .       2.  Weight Loss Tips  1. Do not eat after 6 hrs before your expected bedtime  2. Have your heaviest meal for breakfast, a slightly lighter meal at lunch and a snack 6 hrs before bed  3. No sugar/calorie drinks except milk ie no fruit juice, pop, alcohol.  4. Drink milk 30min before meals to decrease your hunger. Also it is excellent as part of your last meal of the day snack  5. Drink lots of water  6. Increase fiber in diet: all bran cereal, salads, popcorn etc  7. Have only one small serving of fruit a day about 1/2 cup (as this is high in sugar)  8. EXERCISE is the  bottom line. Without it, you will gain weight even on a low calorie diet. Best if done 2-3X a day as can    Being overweight contributes to high blood pressure and high cholesterol, both of which cause heart attacks, strokes and kidney failure, prediabetes and diabetes, arthritis, and liver disease     You must also decrease your caloric intake and especially decrease the carbs or carbohydrates as these are the most harmful regarding the above health risks              Counseling Resources:  ATP IV Guidelines  Pooled Cohorts Equation Calculator  FRAX Risk Assessment  ICSI Preventive Guidelines  Dietary Guidelines for Americans, 2010  USDA's MyPlate  ASA Prophylaxis  Lung CA Screening    Liliana Mattson MD  [facility]

## 2019-11-26 NOTE — LETTER
[facility]  [address]  [place] [address]  [phone]                                                                                                           Homer Wells  [address]  [place] [address]    November 26, 2019      Dear Homer,    Seen by me today.    On vacation on 11-25&26-19 and RTW  11-27-19     Thank you for choosing [facility].  We appreciate the opportunity to serve you and look forward to supporting your healthcare needs in the future.    If you have any questions or concerns, please call me or my staff at [phone].      Sincerely,    Liliana Mattson MD

## 2019-11-27 LAB
ALBUMIN SERPL-MCNC: 3.3 G/DL (ref 3.4–5)
ALP SERPL-CCNC: 123 U/L (ref 40–150)
ALT SERPL W P-5'-P-CCNC: 361 U/L (ref 0–70)
AST SERPL W P-5'-P-CCNC: 388 U/L (ref 0–45)
BILIRUB DIRECT SERPL-MCNC: 2.9 MG/DL (ref 0–0.2)
BILIRUB SERPL-MCNC: 3.6 MG/DL (ref 0.2–1.3)
PROT SERPL-MCNC: 6.7 G/DL (ref 6.8–8.8)

## 2019-11-29 NOTE — RESULT ENCOUNTER NOTE
Please see attached lab results  All of your lab results are normal, except as noted below.    The following are explanations of some of our lab tests    THIS DOES NOT MEAN THAT YOU HAD ALL OF THESE DONE    Please continue on the same medications unless   a change is noted above    These are some general explanations for tests:    Hgb is the blood iron level  WBC means White Blood Cells  Platelets are small blood cells that help with forming the blood clots along with other blood factors.  Electrolytes ar  e Sodium, Potassium, Calcium, Magnesium, Phosphorus.  Liver tests are: AST, ALT, Bilirubin, Alkaline Phosphatase.###though a lot better , still high ###we need to rechek in 2-4 weeks   If not down , you will need further eval of your liver       Kidney tests are Creatinine, GFR.  HDL Cholesterol - is the good cholesterol and it is good to have it high.  LDL cholesterol is the bad ch  olesterol and it is good to have it low.  It is recommended to have LDL less than 130 for people with hypertension and to have it less than 100 for people with heart disease, diabetes and chronic kidney disease.  Triglycerides are another type of lipid a  nd can also cause heart disease so should be kept low.   Thyroid tests are TSH, T4, T3  Glucose is sugar  A1c is a test that gives us an idea about how well was controlled the diabetes for the last 3 months.   PSA stands for Prostate Specific Antigen and   it can be elevated with prostate cancer or prostate inflammation.

## 2019-12-02 ENCOUNTER — TELEPHONE (OUTPATIENT)
Dept: FAMILY MEDICINE | Facility: CLINIC | Age: 49
End: 2019-12-02

## 2019-12-02 PROBLEM — E78.1 HYPERTRIGLYCERIDEMIA: Status: ACTIVE | Noted: 2019-12-02

## 2019-12-02 PROBLEM — M10.9 GOUT, UNSPECIFIED CAUSE, UNSPECIFIED CHRONICITY, UNSPECIFIED SITE: Status: ACTIVE | Noted: 2017-05-19

## 2019-12-02 NOTE — TELEPHONE ENCOUNTER
"Reason for Call:  Other note    Detailed comments: needs new return to work note.  Needs to state \"no restrictions\"  Please mail to patient    Phone Number Patient can be reached at: Home number on file [phone] (home)    Best Time: asap    Can we leave a detailed message on this number? YES    Call taken on 12/2/2019 at 11:55 AM by EDWIN DE LA O    "

## 2019-12-02 NOTE — LETTER
[facility]  [address]  [place] [address]  [phone]                                                                                                           Homer Wells  [address] [place] [address]    December 3, 2019      Dear Jose Rafael Coronel to RTW on 11-27-19 without restrictions     Thank you for choosing [facility].  We appreciate the opportunity to serve you and look forward to supporting your healthcare needs in the future.    If you have any questions or concerns, please call me or my staff at [phone].      Sincerely,    Liliana Mattson MD

## 2020-01-07 DIAGNOSIS — I10 UNCONTROLLED HYPERTENSION: ICD-10-CM

## 2020-01-07 NOTE — TELEPHONE ENCOUNTER
"Requested Prescriptions   Pending Prescriptions Disp Refills     metoprolol succinate ER (TOPROL-XL) 25 MG 24 hr tablet [Pharmacy Med Name: Metoprolol Succinate ER Oral Tablet Extended Release 24 Hour 25 MG]  DISCONTINUED  Last Written Prescription Date:  10/10/2019 END: 11/19/2019  Last Fill Quantity: 90 tablet,  # refills: 0   Last office visit: 11/26/2019 with prescribing provider:  DANIELA Mattson   Future Office Visit:     90 tablet 0     Sig: TAKE ONE TABLET BY MOUTH ONE TIME DAILY       Beta-Blockers Protocol Passed - 1/7/2020  7:02 AM        Passed - Blood pressure under 140/90 in past 12 months     BP Readings from Last 3 Encounters:   11/26/19 132/78   11/19/19 138/80   10/10/19 138/80                 Passed - Patient is age 6 or older        Passed - Recent (12 mo) or future (30 days) visit within the authorizing provider's specialty     Patient has had an office visit with the authorizing provider or a provider within the authorizing providers department within the previous 12 mos or has a future within next 30 days. See \"Patient Info\" tab in inbasket, or \"Choose Columns\" in Meds & Orders section of the refill encounter.              Passed - Medication is active on med list        metoprolol succinate ER (TOPROL-XL) 100 MG 24 hr tablet [Pharmacy Med Name: Metoprolol Succinate ER Oral Tablet Extended Release 24 Hour 100 MG]  Last Written Prescription Date:  10/10/2019  Last Fill Quantity: 90 tablet,  # refills: 0   Last office visit: 11/26/2019 with prescribing provider:  DANIELA Mattson   Future Office Visit:     90 tablet 0     Sig: TAKE ONE TABLET BY MOUTH ONE TIME DAILY       Beta-Blockers Protocol Passed - 1/7/2020  7:02 AM        Passed - Blood pressure under 140/90 in past 12 months     BP Readings from Last 3 Encounters:   11/26/19 132/78   11/19/19 138/80   10/10/19 138/80                 Passed - Patient is age 6 or older        Passed - Recent (12 mo) or future (30 days) visit within the authorizing " "provider's specialty     Patient has had an office visit with the authorizing provider or a provider within the authorizing providers department within the previous 12 mos or has a future within next 30 days. See \"Patient Info\" tab in inbasket, or \"Choose Columns\" in Meds & Orders section of the refill encounter.              Passed - Medication is active on med list           "

## 2020-01-08 RX ORDER — METOPROLOL SUCCINATE 100 MG/1
TABLET, EXTENDED RELEASE ORAL
Qty: 90 TABLET | Refills: 2 | Status: SHIPPED | OUTPATIENT
Start: 2020-01-08 | End: 2020-10-12

## 2020-01-08 RX ORDER — METOPROLOL SUCCINATE 25 MG/1
TABLET, EXTENDED RELEASE ORAL
Qty: 90 TABLET | Refills: 0 | OUTPATIENT
Start: 2020-01-08

## 2020-01-08 NOTE — TELEPHONE ENCOUNTER
Prescription approved per [facility] Refill Protocol for 12 months of refills since last appointment, which was 11/26/2019.     25mg was declined. Patient is not on this dose of medication.

## 2020-02-16 ENCOUNTER — HEALTH MAINTENANCE LETTER (OUTPATIENT)
Age: 50
End: 2020-02-16

## 2020-03-25 ENCOUNTER — MYC REFILL (OUTPATIENT)
Dept: FAMILY MEDICINE | Facility: CLINIC | Age: 50
End: 2020-03-25

## 2020-03-25 DIAGNOSIS — F33.41 RECURRENT MAJOR DEPRESSIVE DISORDER, IN PARTIAL REMISSION (H): ICD-10-CM

## 2020-03-26 RX ORDER — TRAZODONE HYDROCHLORIDE 50 MG/1
50 TABLET, FILM COATED ORAL AT BEDTIME
Qty: 90 TABLET | Refills: 1 | Status: SHIPPED | OUTPATIENT
Start: 2020-03-26 | End: 2020-09-14

## 2020-03-26 NOTE — TELEPHONE ENCOUNTER
Prescription approved per [facility] Refill Protocol for 12 months of refills since last appointment, which was 11/26/2019.

## 2020-04-20 ENCOUNTER — E-VISIT (OUTPATIENT)
Dept: FAMILY MEDICINE | Facility: CLINIC | Age: 50
End: 2020-04-20
Payer: COMMERCIAL

## 2020-04-20 DIAGNOSIS — R74.8 ELEVATED LIVER ENZYMES: ICD-10-CM

## 2020-04-20 DIAGNOSIS — G62.9 PERIPHERAL POLYNEUROPATHY: ICD-10-CM

## 2020-04-20 DIAGNOSIS — R30.0 DYSURIA: ICD-10-CM

## 2020-04-20 DIAGNOSIS — M10.9 GOUT, UNSPECIFIED CAUSE, UNSPECIFIED CHRONICITY, UNSPECIFIED SITE: ICD-10-CM

## 2020-04-20 DIAGNOSIS — R30.0 DYSURIA: Primary | ICD-10-CM

## 2020-04-20 LAB
ALBUMIN UR-MCNC: NEGATIVE MG/DL
AMORPH CRY #/AREA URNS HPF: ABNORMAL /HPF
APPEARANCE UR: CLEAR
BILIRUB UR QL STRIP: NEGATIVE
COLOR UR AUTO: YELLOW
ERYTHROCYTE [DISTWIDTH] IN BLOOD BY AUTOMATED COUNT: 13.2 % (ref 10–15)
GLUCOSE UR STRIP-MCNC: NEGATIVE MG/DL
HBA1C MFR BLD: 6.4 % (ref 0–5.6)
HCT VFR BLD AUTO: 44.5 % (ref 40–53)
HGB BLD-MCNC: 14.4 G/DL (ref 13.3–17.7)
HGB UR QL STRIP: NEGATIVE
KETONES UR STRIP-MCNC: NEGATIVE MG/DL
LEUKOCYTE ESTERASE UR QL STRIP: NEGATIVE
MCH RBC QN AUTO: 28.2 PG (ref 26.5–33)
MCHC RBC AUTO-ENTMCNC: 32.4 G/DL (ref 31.5–36.5)
MCV RBC AUTO: 87 FL (ref 78–100)
NITRATE UR QL: NEGATIVE
PH UR STRIP: 7.5 PH (ref 5–7)
PLATELET # BLD AUTO: 145 10E9/L (ref 150–450)
RBC # BLD AUTO: 5.1 10E12/L (ref 4.4–5.9)
RBC #/AREA URNS AUTO: ABNORMAL /HPF
SOURCE: ABNORMAL
SP GR UR STRIP: 1.02 (ref 1–1.03)
UROBILINOGEN UR STRIP-ACNC: >=8 EU/DL (ref 0.2–1)
WBC # BLD AUTO: 4.2 10E9/L (ref 4–11)
WBC #/AREA URNS AUTO: ABNORMAL /HPF

## 2020-04-20 PROCEDURE — 99422 OL DIG E/M SVC 11-20 MIN: CPT | Performed by: FAMILY MEDICINE

## 2020-04-20 PROCEDURE — 84550 ASSAY OF BLOOD/URIC ACID: CPT | Performed by: FAMILY MEDICINE

## 2020-04-20 PROCEDURE — 80053 COMPREHEN METABOLIC PANEL: CPT | Performed by: FAMILY MEDICINE

## 2020-04-20 PROCEDURE — 87086 URINE CULTURE/COLONY COUNT: CPT | Performed by: FAMILY MEDICINE

## 2020-04-20 PROCEDURE — 83036 HEMOGLOBIN GLYCOSYLATED A1C: CPT | Performed by: FAMILY MEDICINE

## 2020-04-20 PROCEDURE — 36415 COLL VENOUS BLD VENIPUNCTURE: CPT | Performed by: FAMILY MEDICINE

## 2020-04-20 PROCEDURE — 81001 URINALYSIS AUTO W/SCOPE: CPT | Performed by: FAMILY MEDICINE

## 2020-04-20 PROCEDURE — 85027 COMPLETE CBC AUTOMATED: CPT | Performed by: FAMILY MEDICINE

## 2020-04-21 DIAGNOSIS — R73.03 PRE-DIABETES: Primary | ICD-10-CM

## 2020-04-21 LAB
ALBUMIN SERPL-MCNC: 4.1 G/DL (ref 3.4–5)
ALP SERPL-CCNC: 101 U/L (ref 40–150)
ALT SERPL W P-5'-P-CCNC: 288 U/L (ref 0–70)
ANION GAP SERPL CALCULATED.3IONS-SCNC: 2 MMOL/L (ref 3–14)
AST SERPL W P-5'-P-CCNC: 212 U/L (ref 0–45)
BACTERIA SPEC CULT: NO GROWTH
BILIRUB SERPL-MCNC: 1.1 MG/DL (ref 0.2–1.3)
BUN SERPL-MCNC: 11 MG/DL (ref 7–30)
CALCIUM SERPL-MCNC: 9.3 MG/DL (ref 8.5–10.1)
CHLORIDE SERPL-SCNC: 105 MMOL/L (ref 94–109)
CO2 SERPL-SCNC: 31 MMOL/L (ref 20–32)
CREAT SERPL-MCNC: 0.77 MG/DL (ref 0.66–1.25)
GFR SERPL CREATININE-BSD FRML MDRD: >90 ML/MIN/{1.73_M2}
GLUCOSE SERPL-MCNC: 86 MG/DL (ref 70–99)
POTASSIUM SERPL-SCNC: 3.9 MMOL/L (ref 3.4–5.3)
PROT SERPL-MCNC: 7.9 G/DL (ref 6.8–8.8)
SODIUM SERPL-SCNC: 138 MMOL/L (ref 133–144)
SPECIMEN SOURCE: NORMAL
URATE SERPL-MCNC: 7.2 MG/DL (ref 3.5–7.2)

## 2020-04-24 ENCOUNTER — TELEPHONE (OUTPATIENT)
Dept: FAMILY MEDICINE | Facility: CLINIC | Age: 50
End: 2020-04-24

## 2020-04-24 NOTE — TELEPHONE ENCOUNTER
Diabetes Education Scheduling Outreach #1:    Call to patient to schedule. Left message with phone number to call to schedule.    Plan for 2nd outreach attempt within 1 week.    Nila Grey  [facility]  Diabetes and Nutrition Scheduling

## 2020-05-06 NOTE — TELEPHONE ENCOUNTER
Diabetes Education Scheduling Outreach #2:    Call to patient to schedule. Patient declined to schedule.    Nila Grey  [facility]  Diabetes and Nutrition Scheduling

## 2020-06-30 ENCOUNTER — VIRTUAL VISIT (OUTPATIENT)
Dept: FAMILY MEDICINE | Facility: CLINIC | Age: 50
End: 2020-06-30
Payer: COMMERCIAL

## 2020-06-30 DIAGNOSIS — Z53.9 ERRONEOUS ENCOUNTER--DISREGARD: Primary | ICD-10-CM

## 2020-07-08 DIAGNOSIS — I10 UNCONTROLLED HYPERTENSION: Primary | ICD-10-CM

## 2020-07-08 RX ORDER — LISINOPRIL 40 MG/1
TABLET ORAL
Qty: 90 TABLET | Refills: 3 | Status: SHIPPED | OUTPATIENT
Start: 2020-07-08 | End: 2021-06-29

## 2020-10-11 DIAGNOSIS — I10 UNCONTROLLED HYPERTENSION: ICD-10-CM

## 2020-10-12 RX ORDER — METOPROLOL SUCCINATE 100 MG/1
TABLET, EXTENDED RELEASE ORAL
Qty: 90 TABLET | Refills: 1 | Status: SHIPPED | OUTPATIENT
Start: 2020-10-12 | End: 2021-04-07

## 2020-10-12 NOTE — TELEPHONE ENCOUNTER
Routing refill request to provider for review/approval because:  Srinivasan barnett  Another [facility] provider approval needed

## 2020-11-17 ENCOUNTER — VIRTUAL VISIT (OUTPATIENT)
Dept: FAMILY MEDICINE | Facility: OTHER | Age: 50
End: 2020-11-17

## 2020-11-17 DIAGNOSIS — Z20.822 SUSPECTED 2019 NOVEL CORONAVIRUS INFECTION: Primary | ICD-10-CM

## 2020-11-17 NOTE — PROGRESS NOTES
"Date: 2020 09:22:01  Clinician: Vineet Arthur  Clinician NPI: [id number]  Patient: Homer Wells  Patient : 1970  Patient Address[address] [place] [address]  Patient Phone: [phone]  Visit Protocol: URI  Patient Summary:  Homer is a 50 year old ( : 1970 ) male who initiated a [facility] Visit for COVID-19 (Coronavirus) evaluation and screening. When asked the question \"Please sign me up to receive news, health information and promotions. \", Homer responded \"Yes\".    When asked when his symptoms started, Homer reported that he does not have any symptoms.   He denies taking antibiotic medication in the past month and having recent facial or sinus surgery in the past 60 days.    Pertinent COVID-19 (Coronavirus) information  Homer does not work or volunteer as healthcare worker or a . In the past 14 days, Homer has not worked or volunteered at a healthcare facility or group living setting.   In the past 14 days, he also has not lived in a congregate living setting.   Homer has not had a close contact with a laboratory-confirmed COVID-19 patient within the last 14 days.    Since 2019, Homer has not been tested for COVID-19 and has not had upper respiratory infection or influenza-like illness.   Pertinent medical history  Homer needs a return to work/school note.   Weight: 300 lbs   Homer does not smoke or use smokeless tobacco.   Additional information as reported by the patient (free text): I can not return to work without a negative COVID test   Weight: 300 lbs    MEDICATIONS: lisinopril oral, ALLERGIES: NKDA  Clinician Response:  Dear Homer,   Based on your exposure to COVID-19 (coronavirus), we would like to test you for this virus.  1. Please call [phone] to schedule your visit. Explain that you were referred by [facility] to have a COVID-19 test. Be ready to share your [facility] visit ID number.  * If you need to schedule in [place] please " call [phone] or for [place] employees please call [phone].   * If you need to schedule in the [place] area please call [phone]. [place] employees call [phone].   The following will serve as your written order for this COVID Test, ordered by me, for the indication of suspected COVID [Z20.828]: The test will be ordered in [employer], our electronic health record, after you are scheduled. It will show as ordered and authorized by Darren Grey MD.  Order: COVID-19 (coronavirus) PCR for ASYMPTOMATIC EXPOSURE testing from [facility]placed [facility].   If you know you have had close contact with someone who tested positive, you should be quarantined for 14 days after this exposure. You should stay in quarantine for the14 days even if the covid test is negative, the optimal time to test after exposure is 5-7 days from the exposure  Quarantine means   What should I do?  For safety, it's very important to follow these rules. Do this for 14 days after the date you were last exposed to the virus..  Stay home and away from others. Don't go to school or anywhere else. Generally quarantine means staying home from work but there are some exceptions to this. Please contact your workplace.   No hugging, kissing or shaking hands.  Don't let anyone visit.  Cover your mouth and nose with a mask, tissue or washcloth to avoid spreading germs.  Wash your hands and face often. Use soap and water.  What are the symptoms of COVID-19?  The most common symptoms are cough, fever and trouble breathing. Less common symptoms include headache, body aches, fatigue (feeling very tired), chills, sore throat, stuffy or runny nose, diarrhea (loose poop), loss of taste or smell, belly pain, and nausea or vomiting (feeling sick to your stomach or throwing up).  After 14 days, if you have still don't have symptoms, you likely don't have this virus.  If you develop symptoms, follow these guidelines.  If you're normally healthy: Please start another [facility] visit to  report your symptoms. Go to [email or web address].  If you have a serious health problem (like cancer, heart failure, an organ transplant or kidney disease): Call your specialty clinic. Let them know that you might have COVID-19.  2. When it's time for your COVID test:  Stay at least 6 feet away from others. (If someone will drive you to your test, stay in the backseat, as far away from the  as you can.)  Cover your mouth and nose with a mask, tissue or washcloth.  Go straight to the testing site. Don't make any stops on the way there or back.  Please note  Caregivers in these groups are at risk for severe illness due to COVID-19:  o People 65 years and older  o People who live in a nursing home or long-term care facility  o People with chronic disease (lung, heart, cancer, diabetes, kidney, liver, immunologic)  o People who have a weakened immune system, including those who:  Are in cancer treatment  Take medicine that weakens the immune system, such as corticosteroids  Had a bone marrow or organ transplant  Have an immune deficiency  Have poorly controlled HIV or AIDS  Are obese (body mass index of 40 or higher)  Smoke regularly  Where can I get more information?  [facility] -- About COVID-19: [email or web address]  CDC -- What to Do If You're Sick: www.cdc.gov/coronavirus/2019-ncov/about/steps-when-sick.html  CDC -- Ending Home Isolation: www.cdc.gov/coronavirus/2019-ncov/hcp/disposition-in-home-patients.html  CDC -- Caring for Someone: www.cdc.gov/coronavirus/2019-ncov/if-you-are-sick/care-for-someone.html  [facility] -- Interim Guidance for Hospital Discharge to Home: www.health.[facility].[place]/diseases/coronavirus/hcp/hospdischarge.pdf  [facility] clinical trials (COVID-19 research studies): clinicalaffairs.[facility]/umn-clinical-trials  Below are the COVID-19 hotlines at the [place] Department of Health ([facility]). Interpreters are available.  For health questions: Call 394-822-9477 or  1-537.436.6571 (7 a.m. to 7 p.m.)  For questions about schools and childcare: Call 476-163-8818 or 1-950.414.3926 (7 a.m. to 7 p.m.)    Diagnosis: Contact with and (suspected) exposure to other viral communicable diseases  Diagnosis ICD: Z20.828

## 2020-11-18 DIAGNOSIS — Z20.822 SUSPECTED 2019 NOVEL CORONAVIRUS INFECTION: ICD-10-CM

## 2020-11-18 PROCEDURE — U0003 INFECTIOUS AGENT DETECTION BY NUCLEIC ACID (DNA OR RNA); SEVERE ACUTE RESPIRATORY SYNDROME CORONAVIRUS 2 (SARS-COV-2) (CORONAVIRUS DISEASE [COVID-19]), AMPLIFIED PROBE TECHNIQUE, MAKING USE OF HIGH THROUGHPUT TECHNOLOGIES AS DESCRIBED BY CMS-2020-01-R: HCPCS | Performed by: FAMILY MEDICINE

## 2020-11-19 LAB
SARS-COV-2 RNA SPEC QL NAA+PROBE: NOT DETECTED
SPECIMEN SOURCE: NORMAL

## 2020-11-22 ENCOUNTER — HEALTH MAINTENANCE LETTER (OUTPATIENT)
Age: 50
End: 2020-11-22

## 2020-12-30 ENCOUNTER — VIRTUAL VISIT (OUTPATIENT)
Dept: INTERNAL MEDICINE | Facility: CLINIC | Age: 50
End: 2020-12-30
Payer: COMMERCIAL

## 2020-12-30 ENCOUNTER — MYC MEDICAL ADVICE (OUTPATIENT)
Dept: INTERNAL MEDICINE | Facility: CLINIC | Age: 50
End: 2020-12-30

## 2020-12-30 DIAGNOSIS — I10 UNCONTROLLED HYPERTENSION: Primary | Chronic | ICD-10-CM

## 2020-12-30 DIAGNOSIS — M10.9 GOUT, UNSPECIFIED CAUSE, UNSPECIFIED CHRONICITY, UNSPECIFIED SITE: Primary | ICD-10-CM

## 2020-12-30 DIAGNOSIS — F10.20 ALCOHOLISM (H): ICD-10-CM

## 2020-12-30 DIAGNOSIS — F33.0 MAJOR DEPRESSIVE DISORDER, RECURRENT EPISODE, MILD (H): ICD-10-CM

## 2020-12-30 DIAGNOSIS — E66.01 MORBID OBESITY DUE TO EXCESS CALORIES (H): ICD-10-CM

## 2020-12-30 PROBLEM — F33.42 MAJOR DEPRESSIVE DISORDER, RECURRENT EPISODE, IN FULL REMISSION (H): Status: ACTIVE | Noted: 2018-05-31

## 2020-12-30 PROCEDURE — 99214 OFFICE O/P EST MOD 30 MIN: CPT | Mod: 95 | Performed by: FAMILY MEDICINE

## 2020-12-30 RX ORDER — PAROXETINE 20 MG/1
20 TABLET, FILM COATED ORAL EVERY MORNING
Qty: 30 TABLET | Refills: 1 | Status: SHIPPED | OUTPATIENT
Start: 2020-12-30 | End: 2021-03-01

## 2020-12-30 ASSESSMENT — ANXIETY QUESTIONNAIRES
6. BECOMING EASILY ANNOYED OR IRRITABLE: MORE THAN HALF THE DAYS
IF YOU CHECKED OFF ANY PROBLEMS ON THIS QUESTIONNAIRE, HOW DIFFICULT HAVE THESE PROBLEMS MADE IT FOR YOU TO DO YOUR WORK, TAKE CARE OF THINGS AT HOME, OR GET ALONG WITH OTHER PEOPLE: SOMEWHAT DIFFICULT
3. WORRYING TOO MUCH ABOUT DIFFERENT THINGS: SEVERAL DAYS
GAD7 TOTAL SCORE: 5
1. FEELING NERVOUS, ANXIOUS, OR ON EDGE: SEVERAL DAYS
7. FEELING AFRAID AS IF SOMETHING AWFUL MIGHT HAPPEN: NOT AT ALL
2. NOT BEING ABLE TO STOP OR CONTROL WORRYING: SEVERAL DAYS
5. BEING SO RESTLESS THAT IT IS HARD TO SIT STILL: NOT AT ALL

## 2020-12-30 ASSESSMENT — PATIENT HEALTH QUESTIONNAIRE - PHQ9
5. POOR APPETITE OR OVEREATING: NOT AT ALL
SUM OF ALL RESPONSES TO PHQ QUESTIONS 1-9: 10

## 2020-12-30 NOTE — PROGRESS NOTES
"Homer Wells is a 50 year old male who is being evaluated via a billable video visit.      The patient has been notified of following:     \"This video visit will be conducted via a call between you and your physician/provider. We have found that certain health care needs can be provided without the need for an in-person physical exam.  This service lets us provide the care you need with a video conversation.  If a prescription is necessary we can send it directly to your pharmacy.  If lab work is needed we can place an order for that and you can then stop by our lab to have the test done at a later time.    Video visits are billed at different rates depending on your insurance coverage.  Please reach out to your insurance provider with any questions.    If during the course of the call the physician/provider feels a video visit is not appropriate, you will not be charged for this service.\"    Patient has given verbal consent for Video visit? Yes  How would you like to obtain your AVS? MyChart  If you are dropped from the video visit, the video invite should be resent to: MyChart   Will anyone else be joining your video visit? No      Subjective     Homer Wells is a 50 year old male who presents today via video visit for the following health issues:    HPI     Hypertension Follow-up      Do you check your blood pressure regularly outside of the clinic? No     Are you following a low salt diet? No    Are your blood pressures ever more than 140 on the top number (systolic) OR more   than 90 on the bottom number (diastolic), for example 140/90? n/a    Depression Followup    How are you doing with your depression since your last visit? Worsened due to life events     Are you having other symptoms that might be associated with depression? Yes:  insomnia    Have you had a significant life event?  No     Are you feeling anxious or having panic attacks?   No    Do you have any concerns with your use of alcohol or " other drugs? Yes:  alcohol    Social History     Tobacco Use     Smoking status: Former Smoker     Quit date:      Years since quittin.0     Smokeless tobacco: Current User     Types: Snuff   Substance Use Topics     Alcohol use: Yes     Drug use: No     PHQ 10/10/2019 2019 2020   PHQ-9 Total Score 3 4 10   Q9: Thoughts of better off dead/self-harm past 2 weeks Not at all Not at all Not at all   Some encounter information is confidential and restricted. Go to Review Diomics activity to see all data.     JUAN C-7 SCORE 10/10/2019 2019 2020   Total Score 4 0 5   Some encounter information is confidential and restricted. Go to Review [employer] Smart[employer] activity to see all data.     Last PHQ-9 2020   1.  Little interest or pleasure in doing things 1   2.  Feeling down, depressed, or hopeless 1   3.  Trouble falling or staying asleep, or sleeping too much 3   4.  Feeling tired or having little energy 1   5.  Poor appetite or overeating 3   6.  Feeling bad about yourself 1   7.  Trouble concentrating 0   8.  Moving slowly or restless 0   Q9: Thoughts of better off dead/self-harm past 2 weeks 0   PHQ-9 Total Score 10   Difficulty at work, home, or with people Somewhat difficult   Some encounter information is confidential and restricted. Go to Review Diomics activity to see all data.     JUAN C-7  2020   1. Feeling nervous, anxious, or on edge 1   2. Not being able to stop or control worrying 1   3. Worrying too much about different things 1   4. Trouble relaxing 0   5. Being so restless that it is hard to sit still 0   6. Becoming easily annoyed or irritable 2   7. Feeling afraid, as if something awful might happen 0   JUAN C-7 Total Score 5   If you checked any problems, how difficult have they made it for you to do your work, take care of things at home, or get along with other people? Somewhat difficult   Some encounter information is confidential and restricted. Go to Review  Flowsheets activity to see all data.     In the past two weeks have you had thoughts of suicide or self-harm?  No.    Do you have concerns about your personal safety or the safety of others?   No    Suicide Assessment Five-step Evaluation and Treatment (SAFE-T)      How many servings of fruits and vegetables do you eat daily?  2-3    On average, how many sweetened beverages do you drink each day (Examples: soda, juice, sweet tea, etc.  Do NOT count diet or artificially sweetened beverages)?   1-3    How many days per week do you miss taking your medication? 0    Pt admits to drinking more alcohol in past 3-4 weeks, drinking 8-12 beers most every day     Video Start Time: 3:52 PM        Review of Systems   CONSTITUTIONAL: NEGATIVE for fever, chills, change in weight  ENT/MOUTH: NEGATIVE for ear, mouth and throat problems  RESP: NEGATIVE for significant cough or SOB  CV: NEGATIVE for chest pain, palpitations or peripheral edema  PSYCHIATRIC: POSITIVE foralcohol abuse, anxiety, depressed mood, Hx anxiety and Hx depression      Objective           Vitals:  No vitals were obtained today due to virtual visit.  Pt reports that his BP was 173/98 when he checked it earlier today    Physical Exam     GENERAL: Healthy, alert and no distress  EYES: Eyes grossly normal to inspection.  No discharge or erythema, or obvious scleral/conjunctival abnormalities.  RESP: No audible wheeze, cough, or visible cyanosis.  No visible retractions or increased work of breathing.    SKIN: Visible skin clear. No significant rash, abnormal pigmentation or lesions.  NEURO: Cranial nerves grossly intact.  Mentation and speech appropriate for age.  PSYCH: Mentation appears normal, affect normal/bright, judgement and insight intact, normal speech and appearance well-groomed.      Future lab orders placed        Assessment & Plan     Homer was seen today for hypertension and depression.    Diagnoses and all orders for this visit:    Uncontrolled  hypertension  -     Comprehensive metabolic panel; Future  -     Hemoglobin A1c; Future    Alcoholism (H)  -     Hemoglobin A1c; Future    Major depressive disorder, recurrent episode, mild (H)  -     PARoxetine (PAXIL) 20 MG tablet; Take 1 tablet (20 mg) by mouth every morning    Morbid obesity due to excess calories (H)            FUTURE APPOINTMENTS:       - Follow-up visit in 1 mo   Patient Instructions   Need to reconnect with AA  Need to decrease and stop drinking alcohol      Return in about 1 month (around 1/30/2021) for Depression, Anxiety, Hypertension.    Elton Ron MD  [facility]      Video-Visit Details    Type of service:  Video Visit    Video End Time:4:08 PM    Originating Location (pt. Location): Home    Distant Location (provider location):  [facility]     Platform used for Video Visit: Maureen

## 2020-12-31 ASSESSMENT — ANXIETY QUESTIONNAIRES: GAD7 TOTAL SCORE: 5

## 2021-01-05 DIAGNOSIS — M10.9 GOUT, UNSPECIFIED CAUSE, UNSPECIFIED CHRONICITY, UNSPECIFIED SITE: ICD-10-CM

## 2021-01-05 DIAGNOSIS — I10 UNCONTROLLED HYPERTENSION: Chronic | ICD-10-CM

## 2021-01-05 DIAGNOSIS — F10.20 ALCOHOLISM (H): ICD-10-CM

## 2021-01-05 LAB — HBA1C MFR BLD: 6.7 % (ref 0–5.6)

## 2021-01-05 PROCEDURE — 83036 HEMOGLOBIN GLYCOSYLATED A1C: CPT | Performed by: FAMILY MEDICINE

## 2021-01-05 PROCEDURE — 36415 COLL VENOUS BLD VENIPUNCTURE: CPT | Performed by: FAMILY MEDICINE

## 2021-01-05 PROCEDURE — 84550 ASSAY OF BLOOD/URIC ACID: CPT | Performed by: FAMILY MEDICINE

## 2021-01-05 PROCEDURE — 80053 COMPREHEN METABOLIC PANEL: CPT | Performed by: FAMILY MEDICINE

## 2021-01-06 LAB
ALBUMIN SERPL-MCNC: 4 G/DL (ref 3.4–5)
ALP SERPL-CCNC: 108 U/L (ref 40–150)
ALT SERPL W P-5'-P-CCNC: 104 U/L (ref 0–70)
ANION GAP SERPL CALCULATED.3IONS-SCNC: 5 MMOL/L (ref 3–14)
AST SERPL W P-5'-P-CCNC: 72 U/L (ref 0–45)
BILIRUB SERPL-MCNC: 0.8 MG/DL (ref 0.2–1.3)
BUN SERPL-MCNC: 12 MG/DL (ref 7–30)
CALCIUM SERPL-MCNC: 9.2 MG/DL (ref 8.5–10.1)
CHLORIDE SERPL-SCNC: 105 MMOL/L (ref 94–109)
CO2 SERPL-SCNC: 28 MMOL/L (ref 20–32)
CREAT SERPL-MCNC: 0.8 MG/DL (ref 0.66–1.25)
GFR SERPL CREATININE-BSD FRML MDRD: >90 ML/MIN/{1.73_M2}
GLUCOSE SERPL-MCNC: 98 MG/DL (ref 70–99)
POTASSIUM SERPL-SCNC: 3.6 MMOL/L (ref 3.4–5.3)
PROT SERPL-MCNC: 7.5 G/DL (ref 6.8–8.8)
SODIUM SERPL-SCNC: 138 MMOL/L (ref 133–144)
URATE SERPL-MCNC: 5.9 MG/DL (ref 3.5–7.2)

## 2021-01-15 ENCOUNTER — HEALTH MAINTENANCE LETTER (OUTPATIENT)
Age: 51
End: 2021-01-15

## 2021-02-19 ENCOUNTER — TELEPHONE (OUTPATIENT)
Dept: INTERNAL MEDICINE | Facility: CLINIC | Age: 51
End: 2021-02-19

## 2021-02-22 ENCOUNTER — VIRTUAL VISIT (OUTPATIENT)
Dept: INTERNAL MEDICINE | Facility: CLINIC | Age: 51
End: 2021-02-22
Payer: COMMERCIAL

## 2021-02-22 DIAGNOSIS — F10.20 ALCOHOLISM (H): ICD-10-CM

## 2021-02-22 DIAGNOSIS — I10 UNCONTROLLED HYPERTENSION: Primary | ICD-10-CM

## 2021-02-22 DIAGNOSIS — L98.9 SKIN LESION: ICD-10-CM

## 2021-02-22 PROCEDURE — 99214 OFFICE O/P EST MOD 30 MIN: CPT | Mod: 95 | Performed by: INTERNAL MEDICINE

## 2021-02-22 RX ORDER — LISINOPRIL AND HYDROCHLOROTHIAZIDE 12.5; 2 MG/1; MG/1
1 TABLET ORAL DAILY
Qty: 180 TABLET | Refills: 1 | Status: CANCELLED | OUTPATIENT
Start: 2021-02-22

## 2021-02-22 RX ORDER — AMLODIPINE BESYLATE 5 MG/1
5 TABLET ORAL EVERY EVENING
Qty: 90 TABLET | Refills: 1 | Status: SHIPPED | OUTPATIENT
Start: 2021-02-22 | End: 2021-03-10 | Stop reason: DRUGHIGH

## 2021-02-22 NOTE — PROGRESS NOTES
Homer is a 50 year old who is being evaluated via a billable video visit.      How would you like to obtain your AVS? MyChart  Will anyone else be joining your video visit? No      Video Start Time: 351PM unable to use video visit    Assessment & Plan :    Uncontrolled hypertension  Discussed with patient potentially adding low-dose diuretic to his regimen but he states he has been on this in the past and this was discontinued for unclear reason.  He is unclear if it made his gout worse or his kidney function test went up but he does not appear to have any drug allergies.  We decided at this point to consider trial of amlodipine as he states he has not been on in the past.  Suggest amlodipine 5 mg in the evening and continuing with metoprolol and lisinopril in the a.m. with a recheck and close following of his blood pressure in 2 weeks.  - amLODIPine (NORVASC) 5 MG tablet; Take 1 tablet (5 mg) by mouth every evening    Skin lesion  Discussed issues in regards to skin lesions as not able to visualize due to patient's inability to connect with video visit.  May need liquid nitrogen therapy will assess at clinic visit.    Alcoholism (H)  Discussed patient is still drinking quite heavily several times a day making control of his blood pressure challenging.  Discussed the issues of importance of alcohol cessation during this window of treatment.  Discussed issues of alcohol and high blood pressure is increased risk for significant health complications.    Work on weight loss  Regular exercise  Quit drinking advised    Return in about 2 weeks (around 3/8/2021) for BP Recheck.    Piter Jefferson MD  [facility]   Homer is a 50 year old who presents for the following health issues     HPI     New Patient/Transfer of Care- previously seen by Dr. Ron     Hypertension Follow-up      Do you check your blood pressure regularly outside of the clinic? Yes     Are you following a  low salt diet? No    Are your blood pressures ever more than 140 on the top number (systolic) OR more   than 90 on the bottom number (diastolic), for example 140/90? Yes 170/104 last reading     Recheck BP at home during visit:  p 68, 197/124.    Notes now drinking 10-12 beers daily several days a week.  No withdrawal symptoms.      How many servings of fruits and vegetables do you eat daily?  2-3    On average, how many sweetened beverages do you drink each day (Examples: soda, juice, sweet tea, etc.  Do NOT count diet or artificially sweetened beverages)?   1    How many days per week do you miss taking your medication? 0    WART(S)      Onset: 2 months     Description (location/number): left hand middle finger     Accompanying signs and symptoms: Painful: YES    History: prior warts: YES - as a child, were frozen      Therapies tried and outcome: Antiseptic ointment and band-aid        Review of Systems:    CONSTITUTIONAL: NEGATIVE for fever, chills, change in weight  EYES: NEGATIVE for vision changes or irritation  ENT/MOUTH: NEGATIVE for ear, mouth and throat problems  RESP: NEGATIVE for significant cough or SOB  CV: NEGATIVE for chest pain, palpitations or peripheral edema  GI: NEGATIVE for nausea, abdominal pain, heartburn, or change in bowel habits  : NEGATIVE for frequency, dysuria, or hematuria  MUSCULOSKELETAL: NEGATIVE for significant arthralgias or myalgia  NEURO: NEGATIVE for weakness, dizziness or paresthesias  HEME: NEGATIVE for bleeding problems  PSYCHIATRIC: NEGATIVE for changes in mood or affect      Objective       Vitals:  No vitals were obtained today due to virtual visit.    Physical Exam   GENERAL: Healthy, alert and no distress  PSYCH: Mentation appears normal, affect normal/bright, judgement and insight intact, normal speech and appearance well-groomed.  Left hand middle finger appears simple wart per patient report.        Video-Visit Details    Type of service:  Video Visit     Video  End Time: unable to connect on patients end    Originating Location (pt. Location): Home    Distant Location (provider location):  [facility]     Platform used for Video Visit: Maureen

## 2021-02-28 DIAGNOSIS — F33.0 MAJOR DEPRESSIVE DISORDER, RECURRENT EPISODE, MILD (H): ICD-10-CM

## 2021-03-01 RX ORDER — PAROXETINE 20 MG/1
TABLET, FILM COATED ORAL
Qty: 30 TABLET | Refills: 0 | Status: SHIPPED | OUTPATIENT
Start: 2021-03-01 | End: 2021-03-30

## 2021-03-07 DIAGNOSIS — F33.41 RECURRENT MAJOR DEPRESSIVE DISORDER, IN PARTIAL REMISSION (H): ICD-10-CM

## 2021-03-10 ENCOUNTER — OFFICE VISIT (OUTPATIENT)
Dept: INTERNAL MEDICINE | Facility: CLINIC | Age: 51
End: 2021-03-10
Payer: COMMERCIAL

## 2021-03-10 VITALS
BODY MASS INDEX: 41.75 KG/M2 | WEIGHT: 315 LBS | TEMPERATURE: 96.9 F | DIASTOLIC BLOOD PRESSURE: 98 MMHG | HEART RATE: 67 BPM | HEIGHT: 73 IN | SYSTOLIC BLOOD PRESSURE: 168 MMHG | OXYGEN SATURATION: 96 % | RESPIRATION RATE: 16 BRPM

## 2021-03-10 DIAGNOSIS — Z12.5 SCREENING PSA (PROSTATE SPECIFIC ANTIGEN): ICD-10-CM

## 2021-03-10 DIAGNOSIS — B07.8 COMMON WART: ICD-10-CM

## 2021-03-10 DIAGNOSIS — E66.01 MORBID OBESITY DUE TO EXCESS CALORIES (H): ICD-10-CM

## 2021-03-10 DIAGNOSIS — K70.10 ACUTE ALCOHOLIC HEPATITIS (H): ICD-10-CM

## 2021-03-10 DIAGNOSIS — I10 BENIGN ESSENTIAL HYPERTENSION: Primary | ICD-10-CM

## 2021-03-10 DIAGNOSIS — E11.69 TYPE 2 DIABETES MELLITUS WITH OTHER SPECIFIED COMPLICATION, WITHOUT LONG-TERM CURRENT USE OF INSULIN (H): ICD-10-CM

## 2021-03-10 PROBLEM — M54.50 CHRONIC RIGHT-SIDED LOW BACK PAIN WITHOUT SCIATICA: Status: RESOLVED | Noted: 2017-05-19 | Resolved: 2021-03-10

## 2021-03-10 PROBLEM — R53.83 FATIGUE, UNSPECIFIED TYPE: Status: RESOLVED | Noted: 2019-10-10 | Resolved: 2021-03-10

## 2021-03-10 PROBLEM — R09.02 HYPOXIA: Status: RESOLVED | Noted: 2017-05-19 | Resolved: 2021-03-10

## 2021-03-10 PROBLEM — E11.9 DIABETES MELLITUS, TYPE 2 (H): Status: ACTIVE | Noted: 2021-03-10

## 2021-03-10 PROBLEM — G89.29 CHRONIC RIGHT-SIDED LOW BACK PAIN WITHOUT SCIATICA: Status: RESOLVED | Noted: 2017-05-19 | Resolved: 2021-03-10

## 2021-03-10 PROCEDURE — 99214 OFFICE O/P EST MOD 30 MIN: CPT | Mod: 25 | Performed by: INTERNAL MEDICINE

## 2021-03-10 PROCEDURE — 17110 DESTRUCTION B9 LES UP TO 14: CPT | Performed by: INTERNAL MEDICINE

## 2021-03-10 RX ORDER — HYDROCHLOROTHIAZIDE 12.5 MG/1
12.5 TABLET ORAL DAILY
Qty: 90 TABLET | Refills: 1 | Status: SHIPPED | OUTPATIENT
Start: 2021-03-10 | End: 2021-08-30

## 2021-03-10 RX ORDER — AMLODIPINE BESYLATE 10 MG/1
10 TABLET ORAL DAILY
Qty: 90 TABLET | Refills: 1 | Status: SHIPPED | OUTPATIENT
Start: 2021-03-10 | End: 2021-08-30

## 2021-03-10 ASSESSMENT — MIFFLIN-ST. JEOR: SCORE: 2421.61

## 2021-03-10 NOTE — PROGRESS NOTES
"    Assessment & Plan     Benign essential hypertension- uncontrolled on meds   Poorly controlled.  I have suggested we increase his amlodipine to 10 mg daily and continue with his lisinopril and metoprolol as ordered and add low-dose hydrochlorothiazide in a.m. with a recheck of his blood pressure in 4 to 6 weeks  - amLODIPine (NORVASC) 10 MG tablet; Take 1 tablet (10 mg) by mouth daily  - hydrochlorothiazide (HYDRODIURIL) 12.5 MG tablet; Take 1 tablet (12.5 mg) by mouth daily  - Comprehensive metabolic panel; Future    If no improvement the patient may need evaluation for secondary sources for hypertension.    Acute alcoholic hepatitis  Patient does continue to use alcohol.  The risk of such was discussed.  I have suggested a repeat of his liver function panel.  - Comprehensive metabolic panel; Future    Morbid obesity due to excess calories (H)  Encouraged ongoing weight loss.    Screening PSA (prostate specific antigen)  Routine screening based on age.  - PSA, screen; Future    Type 2 diabetes mellitus with other specified complication, without long-term current use of insulin (H)  Repeat A1c and lipid panel recommended.  Statin therapy would be of benefit although liver function profile in setting of alcohol use is concerning.  - Hemoglobin A1c; Future  - Lipid panel reflex to direct LDL Fasting; Future    Common wart  Liquid nitrogen was applied to the skin lesion of the left hand x 2.  Local care was discussed and follow-up also reiterated  - DESTRUCT BENIGN LESION, UP TO 14       BMI:   Estimated body mass index is 44.29 kg/m  as calculated from the following:    Height as of this encounter: 1.854 m (6' 1\").    Weight as of this encounter: 152.3 kg (335 lb 11.2 oz).   Weight management plan: Discussed healthy diet and exercise guidelines    See Patient Instructions    Return in about 1 month (around 4/10/2021) for Lab Work appointment, BP Recheck.    Piter Jefferson MD  [facility] " "LATHA Coronel is a 51 year old who presents for the following health issues     HPI     Is the first time I have seen the patient in the clinic.  We did do a virtual visit on 2/22/2021.  He is a former patient of Dr. Brandt.    Has had a notable history of uncontrolled hypertension as well as diabetes mellitus in the setting of chronic alcohol use and prior history of elevated liver function tests as well as numerous other underlying health concerns as listed.    He is here today primarily to follow-up for his blood pressure and also a skin lesion noted on his left hand.    Hypertension Follow-up  He states he has been compliant with taking his medication although he describes his medication dosing as \"sometimes\".    Do you check your blood pressure regularly outside of the clinic? Yes     Are you following a low salt diet? No    Are your blood pressures ever more than 140 on the top number (systolic) OR more   than 90 on the bottom number (diastolic), for example 140/90? Yes 160's/90's     WART(S)      Onset: months    Description (location/number): Scaly nodular skin lesion noted on the left hand third digit    Accompanying signs and symptoms: Painful: YES    History: prior warts: YES    Therapies tried and outcome: Antiseptic ointment and band-aid        Review of Systems   CONSTITUTIONAL: NEGATIVE for fever, chills, change in weight  EYES: NEGATIVE for vision changes or irritation  ENT/MOUTH: NEGATIVE for ear, mouth and throat problems  RESP: NEGATIVE for significant cough or SOB  CV: NEGATIVE for chest pain, palpitations or peripheral edema  GI: NEGATIVE for nausea, abdominal pain, heartburn, or change in bowel habits  : NEGATIVE for frequency, dysuria, or hematuria  MUSCULOSKELETAL: NEGATIVE for significant arthralgias or myalgia  NEURO: NEGATIVE for weakness, dizziness or paresthesias  HEME: NEGATIVE for bleeding problems  PSYCHIATRIC: NEGATIVE for changes in mood or affect      Objective  " "  BP (!) 168/98   Pulse 67   Temp 96.9  F (36.1  C) (Temporal)   Resp 16   Ht 1.854 m (6' 1\")   Wt (!) 152.3 kg (335 lb 11.2 oz)   SpO2 96%   BMI 44.29 kg/m    Body mass index is 44.29 kg/m .     Physical Exam   GENERAL: healthy, alert and no distress  EYES: Eyes grossly normal to inspection, PERRL and conjunctivae and sclerae normal  HENT: ear canals and TM's normal, nose and mouth without ulcers or lesions  NECK: no adenopathy, no asymmetry, masses, or scars and thyroid normal to palpation  RESP: lungs clear to auscultation - no rales, rhonchi or wheezes  CV: regular rate and rhythm, normal S1 S2, no S3 or S4, no click or rub, no peripheral edema and peripheral pulses strong  MS: no gross musculoskeletal defects noted  Patient is morbidly obese.  Left hand 3rd finger, nail bed line, verrucae in appearance.  Liquid nitrogen was applied x 2 with local care discussed  NEURO: No focal changes  PSYCH: mentation appears normal, affect normal/bright    Lab Results   Component Value Date    A1C 6.7 01/05/2021    A1C 6.4 04/20/2020    A1C 5.4 11/26/2019     LDL Cholesterol Calculated   Date Value Ref Range Status   11/19/2019 63 <100 mg/dL Final     Comment:     Desirable:       <100 mg/dl               "

## 2021-03-12 ENCOUNTER — MYC REFILL (OUTPATIENT)
Dept: FAMILY MEDICINE | Facility: CLINIC | Age: 51
End: 2021-03-12

## 2021-03-12 DIAGNOSIS — F33.41 RECURRENT MAJOR DEPRESSIVE DISORDER, IN PARTIAL REMISSION (H): ICD-10-CM

## 2021-03-15 RX ORDER — TRAZODONE HYDROCHLORIDE 50 MG/1
TABLET, FILM COATED ORAL
Qty: 90 TABLET | Refills: 0 | Status: SHIPPED | OUTPATIENT
Start: 2021-03-15 | End: 2021-06-01

## 2021-03-16 RX ORDER — TRAZODONE HYDROCHLORIDE 50 MG/1
50 TABLET, FILM COATED ORAL AT BEDTIME
Qty: 90 TABLET | Refills: 0 | OUTPATIENT
Start: 2021-03-16

## 2021-03-25 ENCOUNTER — IMMUNIZATION (OUTPATIENT)
Dept: NURSING | Facility: CLINIC | Age: 51
End: 2021-03-25
Payer: COMMERCIAL

## 2021-03-25 PROCEDURE — 0031A PR COVID VAC JANSSEN AD26 0.5ML: CPT

## 2021-03-25 PROCEDURE — 91303 PR COVID VAC JANSSEN AD26 0.5ML: CPT

## 2021-03-27 DIAGNOSIS — Z12.5 SCREENING PSA (PROSTATE SPECIFIC ANTIGEN): ICD-10-CM

## 2021-03-27 DIAGNOSIS — E11.69 TYPE 2 DIABETES MELLITUS WITH OTHER SPECIFIED COMPLICATION, WITHOUT LONG-TERM CURRENT USE OF INSULIN (H): ICD-10-CM

## 2021-03-27 DIAGNOSIS — K70.10 ACUTE ALCOHOLIC HEPATITIS (H): ICD-10-CM

## 2021-03-27 DIAGNOSIS — I10 BENIGN ESSENTIAL HYPERTENSION: ICD-10-CM

## 2021-03-27 LAB
ALBUMIN SERPL-MCNC: 3.8 G/DL (ref 3.4–5)
ALP SERPL-CCNC: 99 U/L (ref 40–150)
ALT SERPL W P-5'-P-CCNC: 69 U/L (ref 0–70)
ANION GAP SERPL CALCULATED.3IONS-SCNC: 1 MMOL/L (ref 3–14)
AST SERPL W P-5'-P-CCNC: 47 U/L (ref 0–45)
BILIRUB SERPL-MCNC: 0.6 MG/DL (ref 0.2–1.3)
BUN SERPL-MCNC: 16 MG/DL (ref 7–30)
CALCIUM SERPL-MCNC: 9.2 MG/DL (ref 8.5–10.1)
CHLORIDE SERPL-SCNC: 105 MMOL/L (ref 94–109)
CHOLEST SERPL-MCNC: 166 MG/DL
CO2 SERPL-SCNC: 33 MMOL/L (ref 20–32)
CREAT SERPL-MCNC: 0.82 MG/DL (ref 0.66–1.25)
GFR SERPL CREATININE-BSD FRML MDRD: >90 ML/MIN/{1.73_M2}
GLUCOSE SERPL-MCNC: 131 MG/DL (ref 70–99)
HBA1C MFR BLD: 7.7 % (ref 0–5.6)
HDLC SERPL-MCNC: 47 MG/DL
LDLC SERPL CALC-MCNC: 98 MG/DL
NONHDLC SERPL-MCNC: 119 MG/DL
POTASSIUM SERPL-SCNC: 3.6 MMOL/L (ref 3.4–5.3)
PROT SERPL-MCNC: 7.6 G/DL (ref 6.8–8.8)
PSA SERPL-ACNC: 0.46 UG/L (ref 0–4)
SODIUM SERPL-SCNC: 139 MMOL/L (ref 133–144)
TRIGL SERPL-MCNC: 103 MG/DL

## 2021-03-27 PROCEDURE — 83036 HEMOGLOBIN GLYCOSYLATED A1C: CPT | Performed by: INTERNAL MEDICINE

## 2021-03-27 PROCEDURE — 80053 COMPREHEN METABOLIC PANEL: CPT | Performed by: INTERNAL MEDICINE

## 2021-03-27 PROCEDURE — G0103 PSA SCREENING: HCPCS | Performed by: INTERNAL MEDICINE

## 2021-03-27 PROCEDURE — 80061 LIPID PANEL: CPT | Performed by: INTERNAL MEDICINE

## 2021-03-27 PROCEDURE — 36415 COLL VENOUS BLD VENIPUNCTURE: CPT | Performed by: INTERNAL MEDICINE

## 2021-03-28 DIAGNOSIS — F33.0 MAJOR DEPRESSIVE DISORDER, RECURRENT EPISODE, MILD (H): ICD-10-CM

## 2021-03-30 RX ORDER — PAROXETINE 20 MG/1
TABLET, FILM COATED ORAL
Qty: 30 TABLET | Refills: 0 | Status: SHIPPED | OUTPATIENT
Start: 2021-03-30 | End: 2021-04-29

## 2021-03-30 NOTE — TELEPHONE ENCOUNTER
PHQ-9 score:    PHQ 12/30/2020   PHQ-9 Total Score 10   Q9: Thoughts of better off dead/self-harm past 2 weeks Not at all   Some encounter information is confidential and restricted. Go to Review Flowsheets activity to see all data.           Routing refill request to provider for review/approval because:  [place] given x1 and patient did not follow up, please advise  Patient needs to be seen because:  Due for medication follow up and PHQ-9 recheck    Jannie ANTONION, RN, PHN

## 2021-04-05 DIAGNOSIS — I10 UNCONTROLLED HYPERTENSION: ICD-10-CM

## 2021-04-06 NOTE — TELEPHONE ENCOUNTER
According to prior refills patient should have enough medicine through July 2021 per last refill done for 1 year on 7/2020

## 2021-04-06 NOTE — TELEPHONE ENCOUNTER
Routing refill request to provider for review/approval because:  Beta-Blockers Protocol [id number]/05/2021 02:01 AM   Blood pressure under 140/90 in past 12 months

## 2021-04-07 RX ORDER — METOPROLOL SUCCINATE 100 MG/1
TABLET, EXTENDED RELEASE ORAL
Qty: 90 TABLET | Refills: 0 | Status: SHIPPED | OUTPATIENT
Start: 2021-04-07 | End: 2021-07-08

## 2021-04-07 NOTE — TELEPHONE ENCOUNTER
The last refill for Metoprolol was 10/12/2020 #90 x one. I do not see a July x one year?    Please advise    Ene SOLANORN BSN  [facility]  [phone]

## 2021-05-26 DIAGNOSIS — F33.0 MAJOR DEPRESSIVE DISORDER, RECURRENT EPISODE, MILD (H): ICD-10-CM

## 2021-05-30 DIAGNOSIS — F33.41 RECURRENT MAJOR DEPRESSIVE DISORDER, IN PARTIAL REMISSION (H): ICD-10-CM

## 2021-06-01 RX ORDER — PAROXETINE 20 MG/1
TABLET, FILM COATED ORAL
Qty: 30 TABLET | Refills: 0 | Status: SHIPPED | OUTPATIENT
Start: 2021-06-01 | End: 2021-06-29

## 2021-06-01 RX ORDER — TRAZODONE HYDROCHLORIDE 50 MG/1
TABLET, FILM COATED ORAL
Qty: 90 TABLET | Refills: 0 | Status: SHIPPED | OUTPATIENT
Start: 2021-06-01 | End: 2021-08-30

## 2021-06-28 DIAGNOSIS — F33.0 MAJOR DEPRESSIVE DISORDER, RECURRENT EPISODE, MILD (H): ICD-10-CM

## 2021-06-28 DIAGNOSIS — I10 UNCONTROLLED HYPERTENSION: ICD-10-CM

## 2021-06-29 DIAGNOSIS — I10 UNCONTROLLED HYPERTENSION: ICD-10-CM

## 2021-06-29 RX ORDER — PAROXETINE 20 MG/1
TABLET, FILM COATED ORAL
Qty: 30 TABLET | Refills: 0 | Status: SHIPPED | OUTPATIENT
Start: 2021-06-29 | End: 2022-06-21

## 2021-06-29 NOTE — TELEPHONE ENCOUNTER
Paroxetine   Routing refill request to provider for review/approval because:  PHQ-9 out of range -- [facility]

## 2021-06-29 NOTE — TELEPHONE ENCOUNTER
BP Readings from Last 3 Encounters:   03/10/21 (!) 168/98   11/26/19 132/78   11/19/19 138/80     Need updated BP as per PCP.    Please call patient for BP follow up and forward refill to PCP.    Meche Ramos RN  [facility]

## 2021-06-30 RX ORDER — LISINOPRIL 40 MG/1
TABLET ORAL
Qty: 90 TABLET | Refills: 3 | Status: SHIPPED | OUTPATIENT
Start: 2021-06-30 | End: 2023-05-31

## 2021-06-30 NOTE — TELEPHONE ENCOUNTER
Routing refill request to provider for review/approval because:  BP not at goal    BP Readings from Last 3 Encounters:   03/10/21 (!) 168/98   11/26/19 132/78   11/19/19 138/80

## 2021-07-08 RX ORDER — METOPROLOL SUCCINATE 100 MG/1
TABLET, EXTENDED RELEASE ORAL
Qty: 90 TABLET | Refills: 0 | Status: SHIPPED | OUTPATIENT
Start: 2021-07-08 | End: 2021-07-13

## 2021-07-09 DIAGNOSIS — I10 UNCONTROLLED HYPERTENSION: ICD-10-CM

## 2021-07-13 RX ORDER — METOPROLOL SUCCINATE 100 MG/1
TABLET, EXTENDED RELEASE ORAL
Qty: 90 TABLET | Refills: 0 | Status: SHIPPED | OUTPATIENT
Start: 2021-07-13 | End: 2021-10-25

## 2021-07-13 NOTE — TELEPHONE ENCOUNTER
Routing refill request to provider for review/approval because:  Blood pressure out of range

## 2021-07-24 ENCOUNTER — HEALTH MAINTENANCE LETTER (OUTPATIENT)
Age: 51
End: 2021-07-24

## 2021-08-28 DIAGNOSIS — I10 BENIGN ESSENTIAL HYPERTENSION: ICD-10-CM

## 2021-08-28 DIAGNOSIS — F33.41 RECURRENT MAJOR DEPRESSIVE DISORDER, IN PARTIAL REMISSION (H): ICD-10-CM

## 2021-08-30 RX ORDER — HYDROCHLOROTHIAZIDE 12.5 MG/1
12.5 TABLET ORAL DAILY
Qty: 90 TABLET | Refills: 0 | Status: SHIPPED | OUTPATIENT
Start: 2021-08-30 | End: 2021-11-22

## 2021-08-30 RX ORDER — TRAZODONE HYDROCHLORIDE 50 MG/1
TABLET, FILM COATED ORAL
Qty: 90 TABLET | Refills: 0 | Status: SHIPPED | OUTPATIENT
Start: 2021-08-30 | End: 2021-11-22

## 2021-08-30 RX ORDER — AMLODIPINE BESYLATE 10 MG/1
10 TABLET ORAL DAILY
Qty: 90 TABLET | Refills: 0 | Status: SHIPPED | OUTPATIENT
Start: 2021-08-30 | End: 2021-09-22

## 2021-08-30 NOTE — TELEPHONE ENCOUNTER
Failed protocol.  please route to  team if patient needs an appointment     Ene SOLANORN BSN  [facility]  [phone]

## 2021-08-30 NOTE — TELEPHONE ENCOUNTER
Routing refill request to provider for review/approval because:  Drug interaction warning  Lakshmi Stanley RN  [facility]

## 2021-09-18 ENCOUNTER — HEALTH MAINTENANCE LETTER (OUTPATIENT)
Age: 51
End: 2021-09-18

## 2021-09-22 ENCOUNTER — OFFICE VISIT (OUTPATIENT)
Dept: INTERNAL MEDICINE | Facility: CLINIC | Age: 51
End: 2021-09-22
Payer: COMMERCIAL

## 2021-09-22 VITALS
HEART RATE: 63 BPM | BODY MASS INDEX: 45.85 KG/M2 | OXYGEN SATURATION: 97 % | DIASTOLIC BLOOD PRESSURE: 78 MMHG | TEMPERATURE: 97.9 F | WEIGHT: 315 LBS | SYSTOLIC BLOOD PRESSURE: 136 MMHG

## 2021-09-22 DIAGNOSIS — E11.9 TYPE 2 DIABETES MELLITUS WITHOUT COMPLICATION, WITHOUT LONG-TERM CURRENT USE OF INSULIN (H): ICD-10-CM

## 2021-09-22 DIAGNOSIS — F10.10 ETOH ABUSE: ICD-10-CM

## 2021-09-22 DIAGNOSIS — I10 BENIGN ESSENTIAL HYPERTENSION: Primary | ICD-10-CM

## 2021-09-22 DIAGNOSIS — M10.9 GOUT OF FOOT, UNSPECIFIED CAUSE, UNSPECIFIED CHRONICITY, UNSPECIFIED LATERALITY: ICD-10-CM

## 2021-09-22 DIAGNOSIS — E78.5 HYPERLIPIDEMIA LDL GOAL <100: ICD-10-CM

## 2021-09-22 LAB
ANION GAP SERPL CALCULATED.3IONS-SCNC: 5 MMOL/L (ref 3–14)
BUN SERPL-MCNC: 12 MG/DL (ref 7–30)
CALCIUM SERPL-MCNC: 9 MG/DL (ref 8.5–10.1)
CHLORIDE BLD-SCNC: 104 MMOL/L (ref 94–109)
CO2 SERPL-SCNC: 29 MMOL/L (ref 20–32)
CREAT SERPL-MCNC: 0.86 MG/DL (ref 0.66–1.25)
CREAT UR-MCNC: 96 MG/DL
GFR SERPL CREATININE-BSD FRML MDRD: >90 ML/MIN/1.73M2
GLUCOSE BLD-MCNC: 110 MG/DL (ref 70–99)
HBA1C MFR BLD: 7 % (ref 0–5.6)
MICROALBUMIN UR-MCNC: 16 MG/L
MICROALBUMIN/CREAT UR: 16.67 MG/G CR (ref 0–17)
POTASSIUM BLD-SCNC: 3.7 MMOL/L (ref 3.4–5.3)
SODIUM SERPL-SCNC: 138 MMOL/L (ref 133–144)
URATE SERPL-MCNC: 6.2 MG/DL (ref 3.5–7.2)

## 2021-09-22 PROCEDURE — 99214 OFFICE O/P EST MOD 30 MIN: CPT | Performed by: INTERNAL MEDICINE

## 2021-09-22 PROCEDURE — 82043 UR ALBUMIN QUANTITATIVE: CPT | Performed by: INTERNAL MEDICINE

## 2021-09-22 PROCEDURE — 80048 BASIC METABOLIC PNL TOTAL CA: CPT | Performed by: INTERNAL MEDICINE

## 2021-09-22 PROCEDURE — 83036 HEMOGLOBIN GLYCOSYLATED A1C: CPT | Performed by: INTERNAL MEDICINE

## 2021-09-22 PROCEDURE — 84550 ASSAY OF BLOOD/URIC ACID: CPT | Performed by: INTERNAL MEDICINE

## 2021-09-22 PROCEDURE — 36415 COLL VENOUS BLD VENIPUNCTURE: CPT | Performed by: INTERNAL MEDICINE

## 2021-09-22 RX ORDER — AMLODIPINE BESYLATE 10 MG/1
5 TABLET ORAL DAILY
Qty: 90 TABLET | Refills: 0
Start: 2021-09-22 | End: 2021-11-22

## 2021-09-22 NOTE — PROGRESS NOTES
"    Assessment & Plan     Benign essential hypertension- uncontrolled on meds   Stable on therapy but suggest reducing amlodipine to 5 mg dose based on below symptoms.  - amLODIPine (NORVASC) 10 MG tablet; Take 0.5 tablets (5 mg) by mouth daily  - Basic metabolic panel  (Ca, Cl, CO2, Creat, Gluc, K, Na, BUN); Future  -Consider recheck blood pressure 3 months    Type 2 diabetes mellitus without complication, without long-term current use of insulin (H)  Recheck A1c.  Suggest diabetic education.  May benefit from initiation of oral therapy.  - Hemoglobin A1c; Future  - AMB Adult Diabetes Educator Referral; Future  - Albumin Random Urine Quantitative with Creat Ratio; Future    Hyperlipidemia LDL goal <100  Recommended and advised statin therapy, patient declined    ETOH abuse  Encouraged cessation of alcohol therapy as potential source for some symptoms.    Gout of foot, unspecified cause, unspecified chronicity, unspecified laterality  Unclear if symptoms more likely related to diabetes with early onset neuropathy versus atypical presentation for gout versus mild issues related to dependent edema exacerbated by amlodipine and steel toed shoes.  - Uric acid    Also advised influenza vaccination.     BMI:   Estimated body mass index is 45.85 kg/m  as calculated from the following:    Height as of 3/10/21: 1.854 m (6' 1\").    Weight as of this encounter: 157.6 kg (347 lb 8 oz).   Weight management plan: Discussed healthy diet and exercise guidelines    Work on weight loss  Regular exercise    No follow-ups on file.    Piter Jefferson MD  [facility]    Bryan Coronel is a 51 year old who presents for the following health issues  accompanied by his self:    HPI     Hypertension Follow-up      Do you check your blood pressure regularly outside of the clinic? No     Are you following a low salt diet? No    Are your blood pressures ever more than 140 on the top number (systolic) OR " more   than 90 on the bottom number (diastolic), for example 140/90? No      How many servings of fruits and vegetables do you eat daily?  2-3    On average, how many sweetened beverages do you drink each day (Examples: soda, juice, sweet tea, etc.  Do NOT count diet or artificially sweetened beverages)?   1    How many days per week do you exercise enough to make your heart beat faster? 5    How many minutes a day do you exercise enough to make your heart beat faster? 30 - 60  How many days per week do you miss taking your medication? 1    What makes it hard for you to take your medications?  remembering to take    Patient also reports having some nonspecific foot pain.  Pain is most notable in the left foot.  He states that the foot kind of swells towards the end of the day and he believes that the swelling causes issues with his steel toed boots which cause his toes to push up against the end of the boot.  He reports primarily symptoms in the middle 3 toes.  He states he has had a history of gout but his symptoms are not synonymous with gout in the sense that most commonly it occurs on the outer portion of his foot.  He does continue to drink prominently several times a week and subsequently stopped his allopurinol also a couple years ago upon recommendations.  Does not distinctly recall having an exacerbation of his gout recently.  He denies any trauma to his foot.    Review of Systems:    CONSTITUTIONAL: NEGATIVE for fever, chills, change in weight  EYES: NEGATIVE for vision changes or irritation  ENT/MOUTH: NEGATIVE for ear, mouth and throat problems  RESP: NEGATIVE for significant cough or SOB  CV: NEGATIVE for chest pain, palpitations or peripheral edema  GI: NEGATIVE for nausea, abdominal pain, heartburn, or change in bowel habits  : NEGATIVE for frequency, dysuria, or hematuria  NEURO: NEGATIVE for weakness, dizziness or paresthesias  HEME: NEGATIVE for bleeding problems  PSYCHIATRIC: NEGATIVE for  changes in mood or affect      Objective    /78   Pulse 63   Temp 97.9  F (36.6  C) (Oral)   Wt (!) 157.6 kg (347 lb 8 oz)   SpO2 97%   BMI 45.85 kg/m    There is no height or weight on file to calculate BMI.  Physical Exam   GENERAL:  alert and no distress  RESP: lungs clear to auscultation - no rales, rhonchi or wheezes  CV: regular rate and rhythm, normal S1 S2, no S3 or S4, no click or rub  ABDOMEN: obese  MS: Evaluation of the foot, left, demonstrates no obvious skin lesions.  The patient is able to flex the foot without reproducible pain.  No obvious skin ulceration or erythema is noted.  There is some mild trace edema bilaterally..  NEURO: No focal changes  PSYCH: mentation appears normal, affect normal/bright    Lab Results   Component Value Date    A1C 7.7 03/27/2021    A1C 6.7 01/05/2021    A1C 6.4 04/20/2020    A1C 5.4 11/26/2019       LDL Cholesterol Calculated   Date Value Ref Range Status   03/27/2021 98 <100 mg/dL Final     Comment:     Desirable:       <100 mg/dl

## 2021-09-23 ENCOUNTER — TELEPHONE (OUTPATIENT)
Dept: INTERNAL MEDICINE | Facility: CLINIC | Age: 51
End: 2021-09-23
Payer: COMMERCIAL

## 2021-09-23 NOTE — TELEPHONE ENCOUNTER
Diabetes Education Scheduling Outreach #1:    Call to patient to schedule. Left message with phone number to call to schedule.    Plan for 2nd outreach attempt within 1 week.    Nila Grey  [facility]  Diabetes and Nutrition Scheduling

## 2021-09-27 NOTE — TELEPHONE ENCOUNTER
Diabetes Education Scheduling Outreach #2:    Call to patient to schedule. Left message with phone number to call to schedule.    Fabiano Reese  [facility]  Diabetes and Nutrition Scheduling

## 2021-11-19 ENCOUNTER — IMMUNIZATION (OUTPATIENT)
Dept: FAMILY MEDICINE | Facility: CLINIC | Age: 51
End: 2021-11-19
Payer: COMMERCIAL

## 2021-11-19 PROCEDURE — 0004A PR COVID VAC PFIZER DIL RECON 30 MCG/0.3 ML IM: CPT

## 2021-11-19 PROCEDURE — 91300 PR COVID VAC PFIZER DIL RECON 30 MCG/0.3 ML IM: CPT

## 2021-11-21 DIAGNOSIS — F33.41 RECURRENT MAJOR DEPRESSIVE DISORDER, IN PARTIAL REMISSION (H): ICD-10-CM

## 2021-11-21 DIAGNOSIS — I10 BENIGN ESSENTIAL HYPERTENSION: ICD-10-CM

## 2021-11-22 RX ORDER — AMLODIPINE BESYLATE 10 MG/1
TABLET ORAL
Qty: 90 TABLET | Refills: 0 | Status: SHIPPED | OUTPATIENT
Start: 2021-11-22 | End: 2022-06-21

## 2021-11-22 RX ORDER — TRAZODONE HYDROCHLORIDE 50 MG/1
TABLET, FILM COATED ORAL
Qty: 90 TABLET | Refills: 0 | Status: SHIPPED | OUTPATIENT
Start: 2021-11-22 | End: 2022-02-17

## 2021-11-22 RX ORDER — HYDROCHLOROTHIAZIDE 12.5 MG/1
TABLET ORAL
Qty: 90 TABLET | Refills: 0 | Status: SHIPPED | OUTPATIENT
Start: 2021-11-22 | End: 2022-02-17

## 2022-01-08 ENCOUNTER — HEALTH MAINTENANCE LETTER (OUTPATIENT)
Age: 52
End: 2022-01-08

## 2022-01-20 DIAGNOSIS — I10 UNCONTROLLED HYPERTENSION: ICD-10-CM

## 2022-01-21 RX ORDER — METOPROLOL SUCCINATE 100 MG/1
TABLET, EXTENDED RELEASE ORAL
Qty: 90 TABLET | Refills: 2 | Status: SHIPPED | OUTPATIENT
Start: 2022-01-21 | End: 2023-05-31

## 2022-01-21 NOTE — TELEPHONE ENCOUNTER
Prescription approved per [facility] Refill Protocol.  Familia Clark RN  [facility] Triage Nurse

## 2022-02-17 DIAGNOSIS — F33.41 RECURRENT MAJOR DEPRESSIVE DISORDER, IN PARTIAL REMISSION (H): ICD-10-CM

## 2022-02-17 DIAGNOSIS — I10 BENIGN ESSENTIAL HYPERTENSION: ICD-10-CM

## 2022-02-17 RX ORDER — HYDROCHLOROTHIAZIDE 12.5 MG/1
TABLET ORAL
Qty: 90 TABLET | Refills: 1 | Status: SHIPPED | OUTPATIENT
Start: 2022-02-17 | End: 2022-08-11

## 2022-02-17 NOTE — TELEPHONE ENCOUNTER
Routing refill request to provider for review/approval because:  Drug interaction warning  Alessandra Rodriguez RN

## 2022-02-17 NOTE — TELEPHONE ENCOUNTER
Prescription approved per [facility] Refill Protocol.  Alessandra Rodriguez RN

## 2022-02-18 RX ORDER — TRAZODONE HYDROCHLORIDE 50 MG/1
TABLET, FILM COATED ORAL
Qty: 90 TABLET | Refills: 1 | Status: SHIPPED | OUTPATIENT
Start: 2022-02-18 | End: 2022-06-21

## 2022-03-05 ENCOUNTER — HEALTH MAINTENANCE LETTER (OUTPATIENT)
Age: 52
End: 2022-03-05

## 2022-04-30 ENCOUNTER — HEALTH MAINTENANCE LETTER (OUTPATIENT)
Age: 52
End: 2022-04-30

## 2022-06-21 ENCOUNTER — OFFICE VISIT (OUTPATIENT)
Dept: INTERNAL MEDICINE | Facility: CLINIC | Age: 52
End: 2022-06-21
Payer: COMMERCIAL

## 2022-06-21 VITALS
HEART RATE: 70 BPM | TEMPERATURE: 97.9 F | DIASTOLIC BLOOD PRESSURE: 90 MMHG | SYSTOLIC BLOOD PRESSURE: 144 MMHG | WEIGHT: 315 LBS | BODY MASS INDEX: 41.75 KG/M2 | RESPIRATION RATE: 16 BRPM | OXYGEN SATURATION: 92 % | HEIGHT: 73 IN

## 2022-06-21 DIAGNOSIS — E66.01 MORBID OBESITY DUE TO EXCESS CALORIES (H): ICD-10-CM

## 2022-06-21 DIAGNOSIS — M10.9 GOUT OF FOOT, UNSPECIFIED CAUSE, UNSPECIFIED CHRONICITY, UNSPECIFIED LATERALITY: ICD-10-CM

## 2022-06-21 DIAGNOSIS — Z11.59 NEED FOR HEPATITIS C SCREENING TEST: ICD-10-CM

## 2022-06-21 DIAGNOSIS — E11.69 TYPE 2 DIABETES MELLITUS WITH OTHER SPECIFIED COMPLICATION, WITHOUT LONG-TERM CURRENT USE OF INSULIN (H): Primary | ICD-10-CM

## 2022-06-21 DIAGNOSIS — F10.20 ALCOHOLISM (H): ICD-10-CM

## 2022-06-21 DIAGNOSIS — I10 BENIGN ESSENTIAL HYPERTENSION: ICD-10-CM

## 2022-06-21 DIAGNOSIS — Z11.4 SCREENING FOR HIV (HUMAN IMMUNODEFICIENCY VIRUS): ICD-10-CM

## 2022-06-21 DIAGNOSIS — F33.0 MAJOR DEPRESSIVE DISORDER, RECURRENT EPISODE, MILD (H): ICD-10-CM

## 2022-06-21 PROCEDURE — 91305 COVID-19,PF,PFIZER (12+ YRS): CPT | Performed by: INTERNAL MEDICINE

## 2022-06-21 PROCEDURE — 0054A COVID-19,PF,PFIZER (12+ YRS): CPT | Performed by: INTERNAL MEDICINE

## 2022-06-21 PROCEDURE — 99214 OFFICE O/P EST MOD 30 MIN: CPT | Mod: 25 | Performed by: INTERNAL MEDICINE

## 2022-06-21 PROCEDURE — 96127 BRIEF EMOTIONAL/BEHAV ASSMT: CPT | Performed by: INTERNAL MEDICINE

## 2022-06-21 ASSESSMENT — ANXIETY QUESTIONNAIRES
GAD7 TOTAL SCORE: 1
7. FEELING AFRAID AS IF SOMETHING AWFUL MIGHT HAPPEN: NOT AT ALL
8. IF YOU CHECKED OFF ANY PROBLEMS, HOW DIFFICULT HAVE THESE MADE IT FOR YOU TO DO YOUR WORK, TAKE CARE OF THINGS AT HOME, OR GET ALONG WITH OTHER PEOPLE?: NOT DIFFICULT AT ALL
2. NOT BEING ABLE TO STOP OR CONTROL WORRYING: NOT AT ALL
GAD7 TOTAL SCORE: 1
3. WORRYING TOO MUCH ABOUT DIFFERENT THINGS: NOT AT ALL
4. TROUBLE RELAXING: NOT AT ALL
6. BECOMING EASILY ANNOYED OR IRRITABLE: SEVERAL DAYS
7. FEELING AFRAID AS IF SOMETHING AWFUL MIGHT HAPPEN: NOT AT ALL
1. FEELING NERVOUS, ANXIOUS, OR ON EDGE: NOT AT ALL
GAD7 TOTAL SCORE: 1
5. BEING SO RESTLESS THAT IT IS HARD TO SIT STILL: NOT AT ALL

## 2022-06-21 ASSESSMENT — PATIENT HEALTH QUESTIONNAIRE - PHQ9
SUM OF ALL RESPONSES TO PHQ QUESTIONS 1-9: 3
SUM OF ALL RESPONSES TO PHQ QUESTIONS 1-9: 3
10. IF YOU CHECKED OFF ANY PROBLEMS, HOW DIFFICULT HAVE THESE PROBLEMS MADE IT FOR YOU TO DO YOUR WORK, TAKE CARE OF THINGS AT HOME, OR GET ALONG WITH OTHER PEOPLE: NOT DIFFICULT AT ALL

## 2022-06-21 NOTE — PROGRESS NOTES
"  Assessment & Plan     Type 2 diabetes mellitus with other specified complication, without long-term current use of insulin (H)  Suggest rechecking A1c level.  Encouraged ongoing dietary change.  - HEMOGLOBIN A1C; Future  - Lipid panel reflex to direct LDL Fasting; Future  - Comprehensive metabolic panel; Future    Alcoholism (H)  Continue with sobriety and follow-up in AA.    Major depressive disorder, recurrent episode, mild (H)  Per patient stable with noted PHQ-9 score today.    Morbid obesity due to excess calories (H)  Encouraged ongoing weight loss and weight reduction    Need for hepatitis C screening test  Ordered as routine screening  - Hepatitis C Screen Reflex to HCV RNA Quant and Genotype; Future    Screening for HIV (human immunodeficiency virus)  Offered as routine screening.  - HIV Antigen Antibody Combo; Future    Gout of foot, unspecified cause, unspecified chronicity, unspecified laterality  Holding on allopurinol due to decreased alcohol intake.  Check uric acid level.  Reviewed prior uric acid levels with patient  - Uric acid; Future    (I10) Benign essential hypertension- uncontrolled on meds   Comment: Advised patient work on being more compliant with taking blood pressure medicine.  He reports he does have a blood pressure monitor at home.  Advised that he consider checking his blood pressure and following it at home and reporting to me via a [employer] message his blood pressure results in a couple weeks.  Plan:            BMI:   Estimated body mass index is 43.35 kg/m  as calculated from the following:    Height as of this encounter: 1.854 m (6' 1\").    Weight as of this encounter: 149.1 kg (328 lb 9.6 oz).   Weight management plan: Discussed healthy diet and exercise guidelines    Work on weight loss  Regular exercise    Return in about 6 months (around 12/21/2022) for diabetes check 6 months, BP Recheck.    Piter Jefferson MD  [facility]    Subjective "   Homer is a 52 year old, presenting for the following health issues:  RECHECK      History of Present Illness       Diabetes:   He presents for follow up of diabetes.  He is not checking blood glucose. He is concerned about other.  He is not experiencing numbness or burning in feet, excessive thirst, blurry vision, weight changes or redness, sores or blisters on feet. The patient has not had a diabetic eye exam in the last 12 months.         Hypertension: He presents for follow up of hypertension.  He does not check blood pressure  regularly outside of the clinic. Outside blood pressures have been over 140/90. He does not follow a low salt diet.      Today's PHQ-9         PHQ-9 Total Score: 3    PHQ-9 Q9 Thoughts of better off dead/self-harm past 2 weeks :   Not at all    How difficult have these problems made it for you to do your work, take care of things at home, or get along with other people: Not difficult at all  Today's JUAN C-7 Score: 1       Other concerns:    Patient is here today for follow-up.  He has not been checking his blood sugars.  He has been trying to control things with diet alone.  Of note he used to drink heavily and now reports to me that he has been successful in being sober for about 2 months.  I congratulated him on such.  Unfortunately for some reason he has not been taking his blood pressure medicine.  He informs me that he normally would come home and consume his blood pressure meds with a couple beers but now that he is drinking he forgets quite frequently to take his medication.  We discussed the importance of such.    He also has not been taking any mental health medications which she has stopped on his own.  He also has stopped his allopurinol but has had no obvious recurrence of his gout.    He would like to update his COVID booster.    Review of Systems   CONSTITUTIONAL: NEGATIVE for fever, chills, mild change in weight  EYES: NEGATIVE for vision changes or irritation  ENT/MOUTH:  "NEGATIVE for ear, mouth and throat problems  RESP: NEGATIVE for significant cough or SOB  CV: NEGATIVE for chest pain, palpitations  GI: NEGATIVE for nausea, abdominal pain, heartburn, or change in bowel habits  : NEGATIVE for frequency, dysuria, or hematuria  NEURO: NEGATIVE for weakness, dizziness or paresthesias  HEME: NEGATIVE for bleeding problems  PSYCHIATRIC: NEGATIVE for changes in mood or affect    Current Outpatient Medications   Medication     hydrochlorothiazide (HYDRODIURIL) 12.5 MG tablet     lisinopril (ZESTRIL) 40 MG tablet     metoprolol succinate ER (TOPROL-XL) 100 MG 24 hr tablet     Multiple Vitamin (MULTI VITAMIN MENS PO)     No current facility-administered medications for this visit.           Objective    BP (!) 144/90   Pulse 70   Temp 97.9  F (36.6  C) (Temporal)   Resp 16   Ht 1.854 m (6' 1\")   Wt 149.1 kg (328 lb 9.6 oz)   SpO2 92%   BMI 43.35 kg/m    Body mass index is 43.35 kg/m .     Physical Exam   GENERAL:  alert and no distress  EYES: Eyes grossly normal to inspection, PERRL and conjunctivae and sclerae normal  HENT: ear canals and TM's normal, nose and mouth without ulcers or lesions  NECK: no adenopathy, no asymmetry, masses, or scars and thyroid normal to palpation  RESP: lungs clear to auscultation - no rales, rhonchi or wheezes  CV: regular rate and rhythm, normal S1 S2, no S3 or S4, no click or rub  ABDOMEN: obese  NEURO: No focal changes  PSYCH: mentation appears normal, affect flat        Lab Results   Component Value Date    A1C 7.0 09/22/2021    A1C 7.7 03/27/2021    A1C 6.7 01/05/2021    A1C 6.4 04/20/2020    A1C 5.4 11/26/2019               .  ..  "

## 2022-06-24 ENCOUNTER — LAB (OUTPATIENT)
Dept: LAB | Facility: CLINIC | Age: 52
End: 2022-06-24
Payer: COMMERCIAL

## 2022-06-24 DIAGNOSIS — E11.69 TYPE 2 DIABETES MELLITUS WITH OTHER SPECIFIED COMPLICATION, WITHOUT LONG-TERM CURRENT USE OF INSULIN (H): ICD-10-CM

## 2022-06-24 DIAGNOSIS — Z11.4 SCREENING FOR HIV (HUMAN IMMUNODEFICIENCY VIRUS): ICD-10-CM

## 2022-06-24 DIAGNOSIS — Z11.59 NEED FOR HEPATITIS C SCREENING TEST: ICD-10-CM

## 2022-06-24 DIAGNOSIS — M10.9 GOUT OF FOOT, UNSPECIFIED CAUSE, UNSPECIFIED CHRONICITY, UNSPECIFIED LATERALITY: ICD-10-CM

## 2022-06-24 LAB
ALBUMIN SERPL-MCNC: 4.4 G/DL (ref 3.4–5)
ALP SERPL-CCNC: 98 U/L (ref 40–150)
ALT SERPL W P-5'-P-CCNC: 72 U/L (ref 0–70)
ANION GAP SERPL CALCULATED.3IONS-SCNC: 9 MMOL/L (ref 3–14)
AST SERPL W P-5'-P-CCNC: 54 U/L (ref 0–45)
BILIRUB SERPL-MCNC: 1.2 MG/DL (ref 0.2–1.3)
BUN SERPL-MCNC: 10 MG/DL (ref 7–30)
CALCIUM SERPL-MCNC: 9.6 MG/DL (ref 8.5–10.1)
CHLORIDE BLD-SCNC: 102 MMOL/L (ref 94–109)
CHOLEST SERPL-MCNC: 138 MG/DL
CO2 SERPL-SCNC: 28 MMOL/L (ref 20–32)
CREAT SERPL-MCNC: 0.82 MG/DL (ref 0.66–1.25)
FASTING STATUS PATIENT QL REPORTED: NO
GFR SERPL CREATININE-BSD FRML MDRD: >90 ML/MIN/1.73M2
GLUCOSE BLD-MCNC: 93 MG/DL (ref 70–99)
HBA1C MFR BLD: 6.9 % (ref 0–5.6)
HDLC SERPL-MCNC: 38 MG/DL
LDLC SERPL CALC-MCNC: 82 MG/DL
NONHDLC SERPL-MCNC: 100 MG/DL
POTASSIUM BLD-SCNC: 3.6 MMOL/L (ref 3.4–5.3)
PROT SERPL-MCNC: 8.3 G/DL (ref 6.8–8.8)
SODIUM SERPL-SCNC: 139 MMOL/L (ref 133–144)
TRIGL SERPL-MCNC: 91 MG/DL
URATE SERPL-MCNC: 7.5 MG/DL (ref 3.5–7.2)

## 2022-06-24 PROCEDURE — 83036 HEMOGLOBIN GLYCOSYLATED A1C: CPT

## 2022-06-24 PROCEDURE — 36415 COLL VENOUS BLD VENIPUNCTURE: CPT

## 2022-06-24 PROCEDURE — 87389 HIV-1 AG W/HIV-1&-2 AB AG IA: CPT

## 2022-06-24 PROCEDURE — 80053 COMPREHEN METABOLIC PANEL: CPT

## 2022-06-24 PROCEDURE — 80061 LIPID PANEL: CPT

## 2022-06-24 PROCEDURE — 84550 ASSAY OF BLOOD/URIC ACID: CPT

## 2022-06-24 PROCEDURE — 86803 HEPATITIS C AB TEST: CPT

## 2022-06-25 LAB
HCV AB SERPL QL IA: NONREACTIVE
HIV 1+2 AB+HIV1 P24 AG SERPL QL IA: NONREACTIVE

## 2022-07-18 ENCOUNTER — E-VISIT (OUTPATIENT)
Dept: INTERNAL MEDICINE | Facility: CLINIC | Age: 52
End: 2022-07-18
Payer: COMMERCIAL

## 2022-07-18 DIAGNOSIS — M54.50 LOW BACK PAIN, UNSPECIFIED BACK PAIN LATERALITY, UNSPECIFIED CHRONICITY, UNSPECIFIED WHETHER SCIATICA PRESENT: Primary | ICD-10-CM

## 2022-07-18 PROCEDURE — 99421 OL DIG E/M SVC 5-10 MIN: CPT | Performed by: INTERNAL MEDICINE

## 2022-07-25 ENCOUNTER — OFFICE VISIT (OUTPATIENT)
Dept: INTERNAL MEDICINE | Facility: CLINIC | Age: 52
End: 2022-07-25
Payer: COMMERCIAL

## 2022-07-25 VITALS
BODY MASS INDEX: 41.08 KG/M2 | OXYGEN SATURATION: 98 % | WEIGHT: 310 LBS | DIASTOLIC BLOOD PRESSURE: 98 MMHG | HEIGHT: 73 IN | SYSTOLIC BLOOD PRESSURE: 168 MMHG | HEART RATE: 73 BPM | TEMPERATURE: 97.8 F

## 2022-07-25 DIAGNOSIS — I10 BENIGN ESSENTIAL HYPERTENSION: ICD-10-CM

## 2022-07-25 DIAGNOSIS — E66.01 MORBID OBESITY DUE TO EXCESS CALORIES (H): ICD-10-CM

## 2022-07-25 DIAGNOSIS — M54.50 CHRONIC RIGHT-SIDED LOW BACK PAIN WITHOUT SCIATICA: Primary | ICD-10-CM

## 2022-07-25 DIAGNOSIS — G89.29 CHRONIC RIGHT-SIDED LOW BACK PAIN WITHOUT SCIATICA: Primary | ICD-10-CM

## 2022-07-25 PROCEDURE — 99213 OFFICE O/P EST LOW 20 MIN: CPT | Performed by: INTERNAL MEDICINE

## 2022-07-25 ASSESSMENT — ENCOUNTER SYMPTOMS: BACK PAIN: 1

## 2022-07-25 NOTE — PATIENT INSTRUCTIONS
Restart Lisinopril., hydrochlorathiazide and Metoprolol  Referral to physical therapy if wish to work with therapist. Otherwise general stretching exercises in AM for low back  Get a blood pressure recheck   in  2 weeks at the  [facility] pharmacy. You will need to go to [email or web address] to schedule the blood pressure check appointment.  Consider pneumococcal 20 vaccine. Can get at pharmacy  or in clinic

## 2022-07-25 NOTE — PROGRESS NOTES
ASSESSMENT:   1. Chronic right-sided low back pain without sciatica  Sharp intermittent pain right paralumbar area.  No pain currently.  No red flags on exam.  Patient offered referral to physical therapy.  He will consider.  Will otherwise work on stretching exercises in the morning and continue weight loss.  - Physical Therapy Referral; Future    2. Benign essential hypertension- uncontrolled on meds   Uncontrolled as patient not taking opiate medication for last month.  Instructed patient to restart lisinopril, hydrochlorothiazide and metoprolol as previously prescribed by Dr. Jefferson and patient to get a blood pressure check at the [facility] pharmacy in 2 weeks    3. Morbid obesity due to excess calories (H):BMI 40-50 w comorbid HTN   Weight improved with discontinuation of alcohol and improved diet.  Encourage patient to continue diet and exercise as able      PLAN:  Restart Lisinopril., hydrochlorathiazide and Metoprolol  Referral to physical therapy if wish to work with therapist. Otherwise general stretching exercises in AM for low back  Get a blood pressure recheck   in  2 weeks at the  [facility] pharmacy. You will need to go to [email or web address] to schedule the blood pressure check appointment.  Consider pneumococcal 20 vaccine. Can get at pharmacy  or in clinic      (Chart documentation was completed, in part, with [employer] voice-recognition software. Even though reviewed, some grammatical, spelling, and word errors may remain.)    Silvano Joy MD  Internal Medicine Department  [facility]      Bryan Coronel is a 52 year old, presenting for the following health issues:  Back Pain      Back Pain   This is a new problem. The current episode started more than 1 week ago. The problem occurs daily. The problem has not changed since onset.The pain is associated with no known injury. The pain is present in the sacro-iliac joint. The quality of the pain is  described as stabbing. The pain does not radiate. The pain is at a severity of 4/10. The pain is moderate. The symptoms are aggravated by twisting and certain positions. The pain is the same all the time (worsens with the movement). He has tried nothing for the symptoms. The treatment provided no relief.   History of Present Illness       Back Pain:  He presents for follow up of back pain. Patient's back pain is a new problem.    Original cause of back pain: not sure  First noticed back pain: more than 1 month ago  Patient feels back pain: 2 to 4 times per dayLocation of back pain:  Right lower back and left lower back  Description of back pain: sharp  Back pain spreads: nowhere    Since patient first noticed back pain, pain is: unchanged  Does back pain interfere with his job:  No  On a scale of 1-10 (10 being the worst), patient describes pain as:  4  What makes back pain worse: certain positions  Acupuncture: not tried  Acetaminophen: not tried  Activity or exercise: not helpful  Chiropractor:  Not tried  Cold: not helpful  Heat: not helpful  Massage: not tried  Muscle relaxants: not tried  NSAIDS: not tried  Opioids: not tried  Physical Therapy: not tried  Rest: not helpful  Steroid Injection: not tried  Stretching: not helpful  Surgery: not tried  TENS unit: not tried  Topical pain relievers: not tried  Other healthcare providers patient is seeing for back pain: None    He eats 2-3 servings of fruits and vegetables daily.He consumes 3 sweetened beverage(s) daily.He exercises with enough effort to increase his heart rate 10 to 19 minutes per day.  He exercises with enough effort to increase his heart rate 5 days per week.   He is taking medications regularly.      Most recent lab results reviewed with pt.      Patient normally followed by Dr. Jefferson.  Seeing patient today in cross coverage.  3-month history of sharp intermittent low back pain right paralumbar area.  Pain will occur when patient is reaching out  "to turn off his clock alarm for example.  Will get a sharp pain lasting for 1 to 2 seconds and then the pain goes away.  Patient denies pain at this moment.  No radiation of pain into the extremities.  Patient denies trauma to the area.  Quit drinking alcohol 3 months ago.  Weight is down 18 pounds in a month and approximately 40 pounds in a year.  Patient has a history of diabetes and has improved his diet regarding that also.  Denies chest pain, shortness of breath or abdominal pain.  No blood in stools seen.  No hematuria or dysuria.  Most recent labs from June noted.  LFTs mildly elevated still but improved.  Not on diabetic medication or statin.  History uric acid elevation.  Patient states he was previously on allopurinol but not on currently.  No recent gout flare since stopping alcohol however.  Patient has not been given a pneumococcal vaccine and declines today  History elevated blood pressure.  Has not been taking his blood pressure medications for the last month.  Patient denies side effects with them.  Simply just has not been taking them    Additional ROS:   Constitutional, HEENT, Cardiovascular, Pulmonary, GI and , Neuro, MSK and Psych review of systems/symptoms are otherwise negative or unchanged from previous, except as noted above.      OBJECTIVE:  BP (!) 168/98   Pulse 73   Temp 97.8  F (36.6  C)   Ht 1.854 m (6' 1\")   Wt 140.6 kg (310 lb)   SpO2 98%   BMI 40.90 kg/m     Estimated body mass index is 40.9 kg/m  as calculated from the following:    Height as of this encounter: 1.854 m (6' 1\").    Weight as of this encounter: 140.6 kg (310 lb).     Neck: no adenopathy. Thyroid normal to palpation. No bruits  Pulm: Lungs clear to auscultation   CV: Regular rates and rhythm  GI: Soft, obese, nontender, Normal active bowel sounds, No hepatosplenomegaly or masses palpable  Ext: Peripheral pulses intact. No edema.  Back: Minimal tenderness to palpation right paralumbar musculature.  Negative " straight leg raise test right.  Nonantalgic gait                      .  ..

## 2022-08-10 DIAGNOSIS — I10 BENIGN ESSENTIAL HYPERTENSION: ICD-10-CM

## 2022-08-11 RX ORDER — HYDROCHLOROTHIAZIDE 12.5 MG/1
TABLET ORAL
Qty: 90 TABLET | Refills: 0 | Status: SHIPPED | OUTPATIENT
Start: 2022-08-11 | End: 2023-05-31

## 2022-08-11 NOTE — TELEPHONE ENCOUNTER
Routing refill request to provider for review/approval because:  BP Readings from Last 3 Encounters:   07/25/22 (!) 168/98   06/21/22 (!) 144/90   09/22/21 136/78         Familia Clark RN  [facility] Triage Nurse

## 2022-11-20 ENCOUNTER — HEALTH MAINTENANCE LETTER (OUTPATIENT)
Age: 52
End: 2022-11-20

## 2023-01-20 ENCOUNTER — TRANSFERRED RECORDS (OUTPATIENT)
Dept: MULTI SPECIALTY CLINIC | Facility: CLINIC | Age: 53
End: 2023-01-20

## 2023-01-20 LAB — RETINOPATHY: NORMAL

## 2023-04-15 ENCOUNTER — HEALTH MAINTENANCE LETTER (OUTPATIENT)
Age: 53
End: 2023-04-15

## 2023-05-31 ENCOUNTER — VIRTUAL VISIT (OUTPATIENT)
Dept: FAMILY MEDICINE | Facility: OTHER | Age: 53
End: 2023-05-31
Payer: COMMERCIAL

## 2023-05-31 DIAGNOSIS — F33.0 MAJOR DEPRESSIVE DISORDER, RECURRENT EPISODE, MILD (H): ICD-10-CM

## 2023-05-31 DIAGNOSIS — M10.9 GOUT, UNSPECIFIED CAUSE, UNSPECIFIED CHRONICITY, UNSPECIFIED SITE: ICD-10-CM

## 2023-05-31 DIAGNOSIS — F10.20 ALCOHOLISM (H): ICD-10-CM

## 2023-05-31 DIAGNOSIS — E66.01 MORBID OBESITY DUE TO EXCESS CALORIES (H): ICD-10-CM

## 2023-05-31 DIAGNOSIS — E11.69 TYPE 2 DIABETES MELLITUS WITH OTHER SPECIFIED COMPLICATION, WITHOUT LONG-TERM CURRENT USE OF INSULIN (H): ICD-10-CM

## 2023-05-31 DIAGNOSIS — I10 UNCONTROLLED HYPERTENSION: Primary | ICD-10-CM

## 2023-05-31 PROCEDURE — 99215 OFFICE O/P EST HI 40 MIN: CPT | Mod: 95 | Performed by: FAMILY MEDICINE

## 2023-05-31 RX ORDER — LISINOPRIL 40 MG/1
TABLET ORAL
Qty: 90 TABLET | Refills: 0 | Status: SHIPPED | OUTPATIENT
Start: 2023-05-31 | End: 2023-08-30

## 2023-05-31 RX ORDER — METOPROLOL SUCCINATE 100 MG/1
100 TABLET, EXTENDED RELEASE ORAL DAILY
Qty: 90 TABLET | Refills: 0 | Status: SHIPPED | OUTPATIENT
Start: 2023-05-31 | End: 2023-08-30

## 2023-05-31 RX ORDER — PAROXETINE 20 MG/1
20 TABLET, FILM COATED ORAL EVERY MORNING
Qty: 30 TABLET | Refills: 1 | Status: SHIPPED | OUTPATIENT
Start: 2023-05-31 | End: 2023-08-30

## 2023-05-31 ASSESSMENT — ANXIETY QUESTIONNAIRES
GAD7 TOTAL SCORE: 5
GAD7 TOTAL SCORE: 5
1. FEELING NERVOUS, ANXIOUS, OR ON EDGE: SEVERAL DAYS
8. IF YOU CHECKED OFF ANY PROBLEMS, HOW DIFFICULT HAVE THESE MADE IT FOR YOU TO DO YOUR WORK, TAKE CARE OF THINGS AT HOME, OR GET ALONG WITH OTHER PEOPLE?: SOMEWHAT DIFFICULT
3. WORRYING TOO MUCH ABOUT DIFFERENT THINGS: SEVERAL DAYS
5. BEING SO RESTLESS THAT IT IS HARD TO SIT STILL: NOT AT ALL
4. TROUBLE RELAXING: SEVERAL DAYS
IF YOU CHECKED OFF ANY PROBLEMS ON THIS QUESTIONNAIRE, HOW DIFFICULT HAVE THESE PROBLEMS MADE IT FOR YOU TO DO YOUR WORK, TAKE CARE OF THINGS AT HOME, OR GET ALONG WITH OTHER PEOPLE: SOMEWHAT DIFFICULT
7. FEELING AFRAID AS IF SOMETHING AWFUL MIGHT HAPPEN: NOT AT ALL
6. BECOMING EASILY ANNOYED OR IRRITABLE: SEVERAL DAYS
2. NOT BEING ABLE TO STOP OR CONTROL WORRYING: SEVERAL DAYS
GAD7 TOTAL SCORE: 5
7. FEELING AFRAID AS IF SOMETHING AWFUL MIGHT HAPPEN: NOT AT ALL

## 2023-05-31 ASSESSMENT — PATIENT HEALTH QUESTIONNAIRE - PHQ9
SUM OF ALL RESPONSES TO PHQ QUESTIONS 1-9: 8
10. IF YOU CHECKED OFF ANY PROBLEMS, HOW DIFFICULT HAVE THESE PROBLEMS MADE IT FOR YOU TO DO YOUR WORK, TAKE CARE OF THINGS AT HOME, OR GET ALONG WITH OTHER PEOPLE: SOMEWHAT DIFFICULT
SUM OF ALL RESPONSES TO PHQ QUESTIONS 1-9: 8

## 2023-05-31 NOTE — PATIENT INSTRUCTIONS
Thank you for visiting [facility]    Get back on your blood pressure medicines.  Your blood pressure was high today.  Please come back in 1-4 weeks for a nurse visit blood pressure check.     Please also get fasting labs in the next few weeks.    Start back on paroxetine at 20 mg daily.  Follow-up in 1 to 2 months to ensure improvement in your mood.    You are also overdue for a physical exam and diabetic foot exam.  Please schedule an in person visit with your regular provider in the next few months.    We will let you know your results as soon as we can.  If you have MyChart, you will be able to see your lab and imaging results shortly after they become available.  I will review these and let you know my interpretation, but this usually takes additional time.  If you have multiple labs, I usually wait until they are all back before sending a note about them unless something is worrisome.  If you do not have MyChart, we will let you know your lab results as soon as we can.  If they are normal, this can take up to a week.     Contact us or return if questions or concerns.     Have a nice day!    Dr. Howard     No follow-ups on file.      If you need medication refills, please contact your pharmacy 3 days before your prescriptions runs out or download the [facility] sharona for your smart phone. If you are out of refills, your pharmacy will contact contact the clinic.                                     [employer] [phone]

## 2023-05-31 NOTE — PROGRESS NOTES
Homer is a 53 year old who is being evaluated via a billable video visit.      How would you like to obtain your AVS? MyChart  If the video visit is dropped, the invitation should be resent by: Text to cell phone: [phone]  Will anyone else be joining your video visit? No      Patient completed E-check in. Questionnaires were blown in and Patient checked in without being called. Any additional information will need to be completed by the provider.    Assessment & Plan       ICD-10-CM    1. Uncontrolled hypertension-though better on increased lisinopril   I10 Comprehensive metabolic panel (BMP + Alb, Alk Phos, ALT, AST, Total. Bili, TP)     Lipid panel reflex to direct LDL Fasting     metoprolol succinate ER (TOPROL XL) 100 MG 24 hr tablet     lisinopril (ZESTRIL) 40 MG tablet      2. Type 2 diabetes mellitus with other specified complication, without long-term current use of insulin (H)  E11.69 Albumin Random Urine Quantitative with Creat Ratio     HEMOGLOBIN A1C      3. Major depressive disorder, recurrent episode, mild (H)  F33.0 PARoxetine (PAXIL) 20 MG tablet      4. Morbid obesity due to excess calories (H)  E66.01       5. Alcoholism (H)  F10.20       6. Gout, unspecified cause, unspecified chronicity, unspecified site  M10.9 Uric acid           1.  We will resume medications.  Recommended he get this rechecked in the next few weeks to ensure it is under better control.  We will also check monitoring labs.  I suspect at least some of his poor control previously was related to excessive alcohol intake.  Congratulated patient on his sobriety.  2.  Has been diet controlled.  He is due for labs.  These were ordered today.  Encourage patient to get fasting labs as soon as convenient in the next few weeks.  3.  Previously treated, and patient states he responded reasonably well to Paxil.  Discussed options of resuming this or trying another medication.  Patient wished to try Paxil again.  Offered extended  "release Paxil, but patient wished to continue with immediate release.  Recommended follow-up in 1 to 2 months.  4.  Encouraged lifestyle modifications.  He has made some progress, but has also suffered some setbacks.  May benefit from certain diabetes medications that have weight loss benefits.  5.  Patient remains sober.  Congratulated patient on his sobriety.  We will recheck monitoring labs today.  6.  Patient reports no recent gout flares.  Discussed that this may be related to his alcohol cessation.  Congratulated him on this.  We will check uric acid levels and consider starting a uricosuric if his liver enzymes have improved.    Portions of this note were completed using Dragon dictation software.  Although reviewed, there may be typographical and other inadvertent errors that remain.         Ordering of each unique test  Prescription drug management  40 minutes spent by me on the date of the encounter doing chart review, history and exam, documentation and further activities per the note       BMI:   Estimated body mass index is 40.9 kg/m  as calculated from the following:    Height as of 7/25/22: 1.854 m (6' 1\").    Weight as of 7/25/22: 140.6 kg (310 lb).   Weight management plan: Discussed healthy diet and exercise guidelines    Patient Instructions   Thank you for visiting [facility]    Get back on your blood pressure medicines.  Your blood pressure was high today.  Please come back in 1-4 weeks for a nurse visit blood pressure check.     Please also get fasting labs in the next few weeks.    Start back on paroxetine at 20 mg daily.  Follow-up in 1 to 2 months to ensure improvement in your mood.    You are also overdue for a physical exam and diabetic foot exam.  Please schedule an in person visit with your regular provider in the next few months.    We will let you know your results as soon as we can.  If you have MyChart, you will be able to see your lab and imaging results shortly " after they become available.  I will review these and let you know my interpretation, but this usually takes additional time.  If you have multiple labs, I usually wait until they are all back before sending a note about them unless something is worrisome.  If you do not have MyChart, we will let you know your lab results as soon as we can.  If they are normal, this can take up to a week.     Contact us or return if questions or concerns.     Have a nice day!    Dr. Howard     No follow-ups on file.      If you need medication refills, please contact your pharmacy 3 days before your prescriptions runs out or download the [facility] sharona for your smart phone. If you are out of refills, your pharmacy will contact contact the clinic.                                     MyChart Assistance [phone]                    Mo oHward MD, MD  [facility] KELTON Coronel is a 53 year old, presenting for the following health issues:   Follow Up, Diabetes, Hypertension, and Recheck Medication        5/31/2023    10:40 AM   Additional Questions   Roomed by Ted AVITIA   Accompanied by Self         5/31/2023    10:40 AM   Patient Reported Additional Medications   Patient reports taking the following new medications None     History of Present Illness       Mental Health Follow-up:  Patient presents to follow-up on Depression & Anxiety.Patient's depression since last visit has been:  Better  The patient is having other symptoms associated with depression.  Patient's anxiety since last visit has been:  Better  The patient is having other symptoms associated with anxiety.  Any significant life events: relationship concerns, job concerns, financial concerns, grief or loss and health concerns  Patient is not feeling anxious or having panic attacks.  Patient has no concerns about alcohol or drug use.    Diabetes:   He presents for follow up of diabetes.  He is not checking blood glucose. He  "has no concerns regarding his diabetes at this time.  He is having weight gain. The patient has had a diabetic eye exam in the last 12 months. Eye exam performed on 1-20-23. Location of last eye exam [facility].        Hypertension: He presents for follow up of hypertension.  He does not check blood pressure  regularly outside of the clinic. Outside blood pressures have been over 140/90. He does not follow a low salt diet.     Reason for visit:  Ran out of blood pressure medication,    He eats 2-3 servings of fruits and vegetables daily.He consumes 3 sweetened beverage(s) daily.He exercises with enough effort to increase his heart rate 20 to 29 minutes per day.  He exercises with enough effort to increase his heart rate 3 or less days per week. He is missing 2 dose(s) of medications per week.  He is not taking prescribed medications regularly due to remembering to take.    Today's PHQ-9         PHQ-9 Total Score: 8    PHQ-9 Q9 Thoughts of better off dead/self-harm past 2 weeks :   Not at all    How difficult have these problems made it for you to do your work, take care of things at home, or get along with other people: Somewhat difficult  Today's JUAN C-7 Score: 5     He normally goes to Dr. Jefferson in [place].      He hasn't been checking his blood sugars at all, not on medications.  Weight has fluctuated a bit over the past year.  His eye exam was in January at [facility].      He's been out of his blood pressure medications for the past week.  His BP today was 149/93.      He notes that he has a bulge when he's lying in bed and flexes his stomach muscles.  Discussed diastasis recti and hernias.      He quit drinking a year ago.  He was able to lose some weight after this, but gained some weight about 4 months later.  His appetite increased after this.      Mood has been problematic for a while, but was \"self-medicating\" until a year ago.  Has been on paroxetine, citalopram, and Effexor XR in the past.  Effexor XR " caused anejaculation.  The SSRIs weren't as bad with this, but didn't do as much for mood as his Effexor XR.                  Review of Systems   Constitutional, HEENT, cardiovascular, pulmonary, gi and gu systems are negative, except as otherwise noted.      Objective    Vitals - Patient Reported  Systolic (Patient Reported): (!) 161  Diastolic (Patient Reported): (!) 102  Weight (Patient Reported): 149.7 kg (330 lb)      Vitals:  No vitals were obtained today due to virtual visit.    Physical Exam   GENERAL: Healthy, alert and no distress  EYES: Eyes grossly normal to inspection.  No discharge or erythema, or obvious scleral/conjunctival abnormalities.  RESP: No audible wheeze, cough, or visible cyanosis.  No visible retractions or increased work of breathing.    SKIN: Visible skin clear. No significant rash, abnormal pigmentation or lesions.  NEURO: Cranial nerves grossly intact.  Mentation and speech appropriate for age.  PSYCH: Mentation appears normal, affect normal/bright, judgement and insight intact, normal speech and appearance well-groomed.    Lab on 06/24/2022   Component Date Value Ref Range Status     Hepatitis C Antibody 06/24/2022 Nonreactive  Nonreactive Final     Hemoglobin A1C 06/24/2022 6.9 (H)  0.0 - 5.6 % Final    Normal <5.7%   Prediabetes 5.7-6.4%    Diabetes 6.5% or higher     Note: Adopted from ADA consensus guidelines.     Cholesterol 06/24/2022 138  <200 mg/dL Final     Triglycerides 06/24/2022 91  <150 mg/dL Final     Direct Measure HDL 06/24/2022 38 (L)  >=40 mg/dL Final     LDL Cholesterol Calculated 06/24/2022 82  <=100 mg/dL Final     Non HDL Cholesterol 06/24/2022 100  <130 mg/dL Final     Patient Fasting > 8hrs? 06/24/2022 No   Final     Sodium 06/24/2022 139  133 - 144 mmol/L Final     Potassium 06/24/2022 3.6  3.4 - 5.3 mmol/L Final     Chloride 06/24/2022 102  94 - 109 mmol/L Final     Carbon Dioxide (CO2) 06/24/2022 28  20 - 32 mmol/L Final     Anion Gap 06/24/2022 9  3 - 14  mmol/L Final     Urea Nitrogen 06/24/2022 10  7 - 30 mg/dL Final     Creatinine 06/24/2022 0.82  0.66 - 1.25 mg/dL Final     Calcium 06/24/2022 9.6  8.5 - 10.1 mg/dL Final     Glucose 06/24/2022 93  70 - 99 mg/dL Final     Alkaline Phosphatase 06/24/2022 98  40 - 150 U/L Final     AST 06/24/2022 54 (H)  0 - 45 U/L Final     ALT 06/24/2022 72 (H)  0 - 70 U/L Final     Protein Total 06/24/2022 8.3  6.8 - 8.8 g/dL Final     Albumin 06/24/2022 4.4  3.4 - 5.0 g/dL Final     Bilirubin Total 06/24/2022 1.2  0.2 - 1.3 mg/dL Final     GFR Estimate 06/24/2022 >90  >60 mL/min/1.73m2 Final    Effective December 21, 2021 eGFRcr in adults is calculated using the 2021 CKD-EPI creatinine equation which includes age and gender (Audelia et al., NEJ, DOI: 10.1056/HYAVax2980694)     HIV Antigen Antibody Combo 06/24/2022 Nonreactive  Nonreactive Final    HIV-1 p24 Ag & HIV-1/HIV-2 Ab Not Detected     Uric Acid 06/24/2022 7.5 (H)  3.5 - 7.2 mg/dL Final     No results found for any visits on 05/31/23.  No results found for this or any previous visit (from the past 24 hour(s)).            Video-Visit Details    Type of service:  Video Visit     Originating Location (pt. Location): Home    Distant Location (provider location):  On-site  Platform used for Video Visit: Maureen

## 2023-06-01 ENCOUNTER — LAB (OUTPATIENT)
Dept: LAB | Facility: CLINIC | Age: 53
End: 2023-06-01
Payer: COMMERCIAL

## 2023-06-01 DIAGNOSIS — E11.69 TYPE 2 DIABETES MELLITUS WITH OTHER SPECIFIED COMPLICATION, WITHOUT LONG-TERM CURRENT USE OF INSULIN (H): ICD-10-CM

## 2023-06-01 DIAGNOSIS — I10 UNCONTROLLED HYPERTENSION: ICD-10-CM

## 2023-06-01 DIAGNOSIS — M10.9 GOUT, UNSPECIFIED CAUSE, UNSPECIFIED CHRONICITY, UNSPECIFIED SITE: ICD-10-CM

## 2023-06-01 LAB
ALBUMIN SERPL BCG-MCNC: 4.4 G/DL (ref 3.5–5.2)
ALP SERPL-CCNC: 112 U/L (ref 40–129)
ALT SERPL W P-5'-P-CCNC: 37 U/L (ref 10–50)
ANION GAP SERPL CALCULATED.3IONS-SCNC: 11 MMOL/L (ref 7–15)
AST SERPL W P-5'-P-CCNC: 37 U/L (ref 10–50)
BILIRUB SERPL-MCNC: 0.7 MG/DL
BUN SERPL-MCNC: 13.3 MG/DL (ref 6–20)
CALCIUM SERPL-MCNC: 9.4 MG/DL (ref 8.6–10)
CHLORIDE SERPL-SCNC: 102 MMOL/L (ref 98–107)
CHOLEST SERPL-MCNC: 158 MG/DL
CREAT SERPL-MCNC: 0.8 MG/DL (ref 0.67–1.17)
CREAT UR-MCNC: 173 MG/DL
DEPRECATED HCO3 PLAS-SCNC: 27 MMOL/L (ref 22–29)
GFR SERPL CREATININE-BSD FRML MDRD: >90 ML/MIN/1.73M2
GLUCOSE SERPL-MCNC: 165 MG/DL (ref 70–99)
HBA1C MFR BLD: 9 % (ref 0–5.6)
HDLC SERPL-MCNC: 36 MG/DL
LDLC SERPL CALC-MCNC: 100 MG/DL
MICROALBUMIN UR-MCNC: <12 MG/L
MICROALBUMIN/CREAT UR: NORMAL MG/G{CREAT}
NONHDLC SERPL-MCNC: 122 MG/DL
POTASSIUM SERPL-SCNC: 4.2 MMOL/L (ref 3.4–5.3)
PROT SERPL-MCNC: 7.3 G/DL (ref 6.4–8.3)
SODIUM SERPL-SCNC: 140 MMOL/L (ref 136–145)
TRIGL SERPL-MCNC: 110 MG/DL
URATE SERPL-MCNC: 6.4 MG/DL (ref 3.4–7)

## 2023-06-01 PROCEDURE — 82043 UR ALBUMIN QUANTITATIVE: CPT

## 2023-06-01 PROCEDURE — 83036 HEMOGLOBIN GLYCOSYLATED A1C: CPT

## 2023-06-01 PROCEDURE — 82570 ASSAY OF URINE CREATININE: CPT

## 2023-06-01 PROCEDURE — 84550 ASSAY OF BLOOD/URIC ACID: CPT

## 2023-06-01 PROCEDURE — 80061 LIPID PANEL: CPT

## 2023-06-01 PROCEDURE — 80053 COMPREHEN METABOLIC PANEL: CPT

## 2023-06-01 PROCEDURE — 36415 COLL VENOUS BLD VENIPUNCTURE: CPT

## 2023-06-02 ENCOUNTER — MYC MEDICAL ADVICE (OUTPATIENT)
Dept: FAMILY MEDICINE | Facility: OTHER | Age: 53
End: 2023-06-02
Payer: COMMERCIAL

## 2023-06-02 DIAGNOSIS — E11.69 TYPE 2 DIABETES MELLITUS WITH OTHER SPECIFIED COMPLICATION, WITHOUT LONG-TERM CURRENT USE OF INSULIN (H): Primary | ICD-10-CM

## 2023-06-02 RX ORDER — METFORMIN HCL 500 MG
TABLET, EXTENDED RELEASE 24 HR ORAL
Qty: 187 TABLET | Refills: 0 | Status: SHIPPED | OUTPATIENT
Start: 2023-06-02 | End: 2023-08-30

## 2023-06-02 NOTE — RESULT ENCOUNTER NOTE
Homer,    All of your labs were normal for you except your diabetes and uric acid.    Your diabetes control has significantly worsened.  At this point, I would recommend adding metformin to your regimen.  Let me know where you would like that sent.    Your uric acid is actually improved from previously, likely due to your lifestyle changes.  But it is recommended to have this under 6 to help prevent gout flares.  If you would be interested, we could consider adding allopurinol to your regimen to further reduce your risk of gout flares.    Have a nice day!    Dr. Howard

## 2023-08-26 DIAGNOSIS — I10 BENIGN ESSENTIAL HYPERTENSION: ICD-10-CM

## 2023-08-28 ENCOUNTER — TELEPHONE (OUTPATIENT)
Dept: INTERNAL MEDICINE | Facility: CLINIC | Age: 53
End: 2023-08-28
Payer: COMMERCIAL

## 2023-08-28 RX ORDER — HYDROCHLOROTHIAZIDE 12.5 MG/1
TABLET ORAL
Qty: 90 TABLET | Refills: 0 | OUTPATIENT
Start: 2023-08-28

## 2023-08-28 NOTE — TELEPHONE ENCOUNTER
"Patient Contact    Attempt # 1    Was call answered?  No.  Left message on voicemail with information to call me back.    On call back, please relay PCP response to patient below:    \"Prescription declined as it appears that this is being prescribed by family practice provider.  Suggest refill be sent accordingly to that provider\"       "

## 2023-08-30 ENCOUNTER — VIRTUAL VISIT (OUTPATIENT)
Dept: INTERNAL MEDICINE | Facility: CLINIC | Age: 53
End: 2023-08-30
Payer: COMMERCIAL

## 2023-08-30 DIAGNOSIS — F33.0 MAJOR DEPRESSIVE DISORDER, RECURRENT EPISODE, MILD (H): ICD-10-CM

## 2023-08-30 DIAGNOSIS — E11.69 TYPE 2 DIABETES MELLITUS WITH OTHER SPECIFIED COMPLICATION, WITHOUT LONG-TERM CURRENT USE OF INSULIN (H): ICD-10-CM

## 2023-08-30 DIAGNOSIS — I10 BENIGN ESSENTIAL HYPERTENSION: ICD-10-CM

## 2023-08-30 DIAGNOSIS — E11.69 TYPE 2 DIABETES MELLITUS WITH OTHER SPECIFIED COMPLICATION, WITHOUT LONG-TERM CURRENT USE OF INSULIN (H): Primary | ICD-10-CM

## 2023-08-30 DIAGNOSIS — I10 UNCONTROLLED HYPERTENSION: ICD-10-CM

## 2023-08-30 DIAGNOSIS — E66.01 MORBID OBESITY DUE TO EXCESS CALORIES (H): ICD-10-CM

## 2023-08-30 PROCEDURE — 99207 PR NO CHARGE LOS: CPT | Performed by: INTERNAL MEDICINE

## 2023-08-30 RX ORDER — PAROXETINE 20 MG/1
20 TABLET, FILM COATED ORAL EVERY MORNING
Qty: 30 TABLET | Refills: 1 | Status: SHIPPED | OUTPATIENT
Start: 2023-08-30 | End: 2023-08-31 | Stop reason: DRUGHIGH

## 2023-08-30 RX ORDER — METFORMIN HCL 500 MG
TABLET, EXTENDED RELEASE 24 HR ORAL
Qty: 187 TABLET | Refills: 0 | Status: SHIPPED | OUTPATIENT
Start: 2023-08-30 | End: 2023-08-31 | Stop reason: DRUGHIGH

## 2023-08-30 RX ORDER — METOPROLOL SUCCINATE 100 MG/1
100 TABLET, EXTENDED RELEASE ORAL DAILY
Qty: 90 TABLET | Refills: 0 | Status: SHIPPED | OUTPATIENT
Start: 2023-08-30 | End: 2023-11-21

## 2023-08-30 RX ORDER — METFORMIN HCL 500 MG
TABLET, EXTENDED RELEASE 24 HR ORAL
Qty: 187 TABLET | Refills: 0 | OUTPATIENT
Start: 2023-08-30

## 2023-08-30 RX ORDER — LISINOPRIL 40 MG/1
TABLET ORAL
Qty: 90 TABLET | Refills: 0 | Status: SHIPPED | OUTPATIENT
Start: 2023-08-30 | End: 2023-11-21

## 2023-08-30 RX ORDER — METOPROLOL SUCCINATE 100 MG/1
100 TABLET, EXTENDED RELEASE ORAL DAILY
Qty: 90 TABLET | Refills: 0 | OUTPATIENT
Start: 2023-08-30

## 2023-08-30 RX ORDER — HYDROCHLOROTHIAZIDE 12.5 MG/1
TABLET ORAL
Qty: 90 TABLET | Refills: 0 | OUTPATIENT
Start: 2023-08-30

## 2023-08-30 RX ORDER — LISINOPRIL 40 MG/1
TABLET ORAL
Qty: 90 TABLET | Refills: 0 | OUTPATIENT
Start: 2023-08-30

## 2023-08-30 RX ORDER — PAROXETINE 20 MG/1
20 TABLET, FILM COATED ORAL EVERY MORNING
Qty: 30 TABLET | Refills: 0 | OUTPATIENT
Start: 2023-08-30

## 2023-08-30 NOTE — PROGRESS NOTES
Patient scheduled for a virtual visit for medication refills and discussed with patient as it appears that he has an appointment scheduled tomorrow to review his recent lab work performed.  Patient states that he saw a different provider as he was unable to get in to see me and is still identifying me as his primary provider.  Refiils prior were done by this provider.  He states he does not need any refills at the present time.    Discussed options in regards to his blood sugar control with the consideration of adding Ozempic.  He will check with his insurance to see if he has coverage and he will follow-up with me tomorrow in clinic for an appointment.    This virtual visit was therefore canceled.    (E11.69) Type 2 diabetes mellitus with other specified complication, without long-term current use of insulin (H)  (primary encounter diagnosis)  Comment: Discussed A1c level and need for better blood sugar control.  We will address these issues when seen in the clinic tomorrow.  Plan:     (E66.01) Morbid obesity due to excess calories (H):BMI 40-50 w comorbid HTN   Comment: Urged ongoing weight loss and discussed the benefit of a trial of Ozempic pending insurance coverage  Plan:

## 2023-08-30 NOTE — PROGRESS NOTES
SUBJECTIVE:   CC: Homer is an 53 year old who presents for preventative health visit.       Healthy Habits:     Getting at least 3 servings of Calcium per day:  NO    Bi-annual eye exam:  Yes    Dental care twice a year:  Yes    Sleep apnea or symptoms of sleep apnea:  Sleep apnea    Diet:  Diabetic    Frequency of exercise:  1 day/week    Duration of exercise:  Less than 15 minutes    Taking medications regularly:  No    Barriers to taking medications:  Problems remembering to take them    Additional concerns today:  Yes    Other concerns:  1. Episode of BRB blood in stool 4 days ago, patient was experiencing constipation at the time and did a lot of straining.  No current symptoms.  Prior colonoscopy reviewed.    Have you ever done Advance Care Planning? (For example, a Health Directive, POLST, or a discussion with a medical provider or your loved ones about your wishes): No, advance care planning information given to patient to review.  Patient declined advance care planning discussion at this time.    Social History     Tobacco Use    Smoking status: Former     Packs/day: 0.00     Years: 0.00     Pack years: 0.00     Types: Cigarettes     Quit date: 1999     Years since quittin.6    Smokeless tobacco: Current     Types: Snuff   Substance Use Topics    Alcohol use: Yes         Last PSA:   PSA   Date Value Ref Range Status   2021 0.46 0 - 4 ug/L Final     Comment:     Assay Method:  Chemiluminescence using Siemens Vista analyzer       Reviewed orders with patient. Reviewed health maintenance and updated orders accordingly - Yes    Current Outpatient Medications   Medication    hydrochlorothiazide (HYDRODIURIL) 12.5 MG tablet    lisinopril (ZESTRIL) 40 MG tablet    metoprolol succinate ER (TOPROL XL) 100 MG 24 hr tablet    Multiple Vitamin (MULTI VITAMIN MENS PO)    PARoxetine (PAXIL) 20 MG tablet     No current facility-administered medications for this visit.         Reviewed and updated as needed  "this visit by clinical staff   Tobacco  Allergies  Meds              Reviewed and updated as needed this visit by Provider                 Immunization History   Administered Date(s) Administered    COVID-19 MONOVALENT 12+ (Pfizer) 11/19/2021    COVID-19 Monovalent 12+ (Pfizer 2022) 06/21/2022    COVID-19 Vaccine (Sandra) 03/25/2021    TD,PF 7+ (Tenivac) 01/01/2003    TDAP Vaccine (Adacel) 06/13/2013    Tetanus 03/01/2013    Zoster recombinant adjuvanted (SHINGRIX) 08/21/2019, 01/07/2020         Review of Systems   Constitutional:  Negative for chills and fever.   HENT:  Negative for congestion, ear pain, hearing loss and sore throat.    Eyes:  Negative for pain and visual disturbance.   Respiratory:  Negative for cough and shortness of breath.    Cardiovascular:  Negative for chest pain, palpitations and peripheral edema.   Gastrointestinal:  Negative for abdominal pain, constipation, diarrhea, heartburn, hematochezia and nausea.   Genitourinary:  Negative for dysuria, frequency, genital sores, hematuria, impotence, penile discharge and urgency.   Musculoskeletal:  Negative for arthralgias, joint swelling and myalgias.   Skin:  Negative for rash.   Neurological:  Negative for dizziness, weakness, headaches and paresthesias.   Psychiatric/Behavioral:  Negative for mood changes. The patient is nervous/anxious.    Patient does believe that he needs a higher dose of his antidepressant.    OBJECTIVE:   BP (!) 146/92   Pulse 67   Temp 98.4  F (36.9  C) (Temporal)   Resp 17   Ht 1.803 m (5' 11\")   Wt (!) 155.6 kg (343 lb)   SpO2 97%   BMI 47.84 kg/m      Physical Exam  GENERAL: alert and no distress  EYES: Eyes grossly normal to inspection, PERRL and conjunctivae and sclerae normal  HENT: ear canals and TM's normal, nose and mouth without ulcers or lesions  NECK: no adenopathy, no asymmetry, masses, or scars and thyroid normal to palpation  RESP: lungs clear to auscultation - no rales, rhonchi or wheezes  CV: " regular rate and rhythm, normal S1 S2, no S3 or S4, no murmur, click or rub,  ABDOMEN: obse, soft, nontender, no hepatosplenomegaly, no masses and bowel sounds normal  MS: no gross musculoskeletal defects noted, no edema  NEURO: Normal strength and tone, mentation intact and speech normal  PSYCH: mentation appears normal, affect normal/bright    Lab Results   Component Value Date    A1C 9.0 06/01/2023    A1C 6.9 06/24/2022    A1C 7.0 09/22/2021    A1C 7.7 03/27/2021    A1C 6.7 01/05/2021    A1C 6.4 04/20/2020    A1C 5.4 11/26/2019     Component      Latest Ref Rng 6/1/2023  12:37 PM 6/1/2023  12:42 PM   Sodium      136 - 145 mmol/L 140     Potassium      3.4 - 5.3 mmol/L 4.2     Chloride      98 - 107 mmol/L 102     Carbon Dioxide (CO2)      22 - 29 mmol/L 27     Anion Gap      7 - 15 mmol/L 11     Urea Nitrogen      6.0 - 20.0 mg/dL 13.3     Creatinine      0.67 - 1.17 mg/dL 0.80     Calcium      8.6 - 10.0 mg/dL 9.4     Glucose      70 - 99 mg/dL 165 (H)     Alkaline Phosphatase      40 - 129 U/L 112     AST      10 - 50 U/L 37     ALT      10 - 50 U/L 37     Protein Total      6.4 - 8.3 g/dL 7.3     Albumin      3.5 - 5.2 g/dL 4.4     Bilirubin Total      <=1.2 mg/dL 0.7     GFR Estimate      >60 mL/min/1.73m2 >90     Cholesterol      <200 mg/dL 158     Triglycerides      <150 mg/dL 110     HDL Cholesterol      >=40 mg/dL 36 (L)     LDL Cholesterol Calculated      <=100 mg/dL 100     Non HDL Cholesterol      <130 mg/dL 122     Creatinine Urine      mg/dL  173.0    Albumin Urine mg/L      mg/L  <12.0    Albumin Urine mg/g Cr  --    Hemoglobin A1C      0.0 - 5.6 % 9.0 (H)     Uric Acid      3.4 - 7.0 mg/dL 6.4            ASSESSMENT/PLAN:     (Z00.00) Routine general medical examination at a health care facility  (primary encounter diagnosis)  Comment: Advise update of routine vaccinations as recommended.  Plan: CBC with platelets            (E11.69) Type 2 diabetes mellitus with other specified complication,  without long-term current use of insulin (H)  Comment: New prescription sent for diabetes education as well as testing supplies.  Increase Glucophage Exar to 1500 mg and consider adding Ozempic per coverage  Plan: semaglutide (OZEMPIC) 2 MG/3ML pen, metFORMIN         (GLUCOPHAGE XR) 500 MG 24 hr tablet, HEMOGLOBIN        A1C, blood glucose monitoring (NO BRAND         SPECIFIED) meter device kit, thin (NO BRAND         SPECIFIED) lancets, blood glucose (NO BRAND         SPECIFIED) test strip, AMB Adult Diabetes         Educator Referral, OFFICE/OUTPT VISIT,EST,LEVL         IV            (E66.01) Morbid obesity due to excess calories (H):BMI 40-50 w comorbid HTN   Comment: Urged ongoing weight loss and weight reduction.  Plan:     (I10) Benign essential hypertension- uncontrolled on meds   Comment: Suggest increasing hydrochlorothiazide to 25 mg daily as patient has ample supply at home.  If limited response consider change to chlorthalidone.  Discussed close observation with patient in regards to uric acid level thus encourage dietary change and fluid hydration.  Creatinine   Date Value Ref Range Status   06/01/2023 0.80 0.67 - 1.17 mg/dL Final   03/27/2021 0.82 0.66 - 1.25 mg/dL Final     Plan: hydrochlorothiazide (HYDRODIURIL) 12.5 MG         tablet, OFFICE/OUTPT VISIT,EST,LEVL IV,         DISCONTINUED: hydrochlorothiazide (HYDRODIURIL)        12.5 MG tablet            (F33.0) Major depressive disorder, recurrent episode, mild (H)  Comment: Increase paroxetine to 30 mg daily.  Plan: PARoxetine (PAXIL) 30 MG tablet, OFFICE/OUTPT         VISIT,EST,LEVL IV            (Z12.5) Screening for prostate cancer  Comment: Ordered as routine screening.  Plan: PSA, screen            (Z12.11) Colon cancer screening  Comment: Noted's complaints above.  Suggest FIT testing and if positive consider repeat colonoscopy  Plan: Fecal colorectal cancer screen (FIT)            Patient has been advised of split billing requirements and  indicates understanding: Yes      COUNSELING:   Reviewed preventive health counseling, as reflected in patient instructions       Regular exercise       Healthy diet/nutrition        He reports that he quit smoking about 24 years ago. His smoking use included cigarettes. His smokeless tobacco use includes snuff.      Piter Jefferson MD  [facility]

## 2023-08-31 ENCOUNTER — OFFICE VISIT (OUTPATIENT)
Dept: INTERNAL MEDICINE | Facility: CLINIC | Age: 53
End: 2023-08-31
Attending: FAMILY MEDICINE
Payer: COMMERCIAL

## 2023-08-31 VITALS
OXYGEN SATURATION: 97 % | SYSTOLIC BLOOD PRESSURE: 146 MMHG | RESPIRATION RATE: 17 BRPM | HEIGHT: 71 IN | TEMPERATURE: 98.4 F | WEIGHT: 315 LBS | DIASTOLIC BLOOD PRESSURE: 92 MMHG | HEART RATE: 67 BPM | BODY MASS INDEX: 44.1 KG/M2

## 2023-08-31 DIAGNOSIS — E11.69 TYPE 2 DIABETES MELLITUS WITH OTHER SPECIFIED COMPLICATION, WITHOUT LONG-TERM CURRENT USE OF INSULIN (H): ICD-10-CM

## 2023-08-31 DIAGNOSIS — Z12.5 SCREENING FOR PROSTATE CANCER: ICD-10-CM

## 2023-08-31 DIAGNOSIS — E66.01 MORBID OBESITY DUE TO EXCESS CALORIES (H): ICD-10-CM

## 2023-08-31 DIAGNOSIS — Z00.00 ROUTINE GENERAL MEDICAL EXAMINATION AT A HEALTH CARE FACILITY: Primary | ICD-10-CM

## 2023-08-31 DIAGNOSIS — Z12.11 COLON CANCER SCREENING: ICD-10-CM

## 2023-08-31 DIAGNOSIS — F33.0 MAJOR DEPRESSIVE DISORDER, RECURRENT EPISODE, MILD (H): ICD-10-CM

## 2023-08-31 DIAGNOSIS — I10 BENIGN ESSENTIAL HYPERTENSION: ICD-10-CM

## 2023-08-31 LAB
ERYTHROCYTE [DISTWIDTH] IN BLOOD BY AUTOMATED COUNT: 12.8 % (ref 10–15)
HBA1C MFR BLD: 8.6 % (ref 0–5.6)
HCT VFR BLD AUTO: 44.8 % (ref 40–53)
HGB BLD-MCNC: 14.7 G/DL (ref 13.3–17.7)
MCH RBC QN AUTO: 26.3 PG (ref 26.5–33)
MCHC RBC AUTO-ENTMCNC: 32.8 G/DL (ref 31.5–36.5)
MCV RBC AUTO: 80 FL (ref 78–100)
PLATELET # BLD AUTO: 191 10E3/UL (ref 150–450)
PSA SERPL DL<=0.01 NG/ML-MCNC: 0.27 NG/ML (ref 0–3.5)
RBC # BLD AUTO: 5.59 10E6/UL (ref 4.4–5.9)
WBC # BLD AUTO: 7.9 10E3/UL (ref 4–11)

## 2023-08-31 PROCEDURE — 99396 PREV VISIT EST AGE 40-64: CPT | Performed by: INTERNAL MEDICINE

## 2023-08-31 PROCEDURE — 99214 OFFICE O/P EST MOD 30 MIN: CPT | Mod: 25 | Performed by: INTERNAL MEDICINE

## 2023-08-31 PROCEDURE — 85027 COMPLETE CBC AUTOMATED: CPT | Performed by: INTERNAL MEDICINE

## 2023-08-31 PROCEDURE — 36415 COLL VENOUS BLD VENIPUNCTURE: CPT | Performed by: INTERNAL MEDICINE

## 2023-08-31 PROCEDURE — 83036 HEMOGLOBIN GLYCOSYLATED A1C: CPT | Performed by: INTERNAL MEDICINE

## 2023-08-31 PROCEDURE — G0103 PSA SCREENING: HCPCS | Performed by: INTERNAL MEDICINE

## 2023-08-31 RX ORDER — PAROXETINE 30 MG/1
30 TABLET, FILM COATED ORAL EVERY MORNING
Qty: 90 TABLET | Refills: 1 | Status: SHIPPED | OUTPATIENT
Start: 2023-08-31 | End: 2024-02-20

## 2023-08-31 RX ORDER — METFORMIN HCL 500 MG
1500 TABLET, EXTENDED RELEASE 24 HR ORAL
Qty: 180 TABLET | Refills: 1 | Status: SHIPPED | OUTPATIENT
Start: 2023-08-31 | End: 2023-12-27

## 2023-08-31 RX ORDER — LANCETS
EACH MISCELLANEOUS
Qty: 100 EACH | Refills: 6 | Status: SHIPPED | OUTPATIENT
Start: 2023-08-31

## 2023-08-31 RX ORDER — HYDROCHLOROTHIAZIDE 12.5 MG/1
12.5 TABLET ORAL DAILY
Qty: 90 TABLET | Refills: 1 | Status: SHIPPED | OUTPATIENT
Start: 2023-08-31 | End: 2023-08-31 | Stop reason: DRUGHIGH

## 2023-08-31 RX ORDER — HYDROCHLOROTHIAZIDE 12.5 MG/1
TABLET ORAL
COMMUNITY
Start: 2023-05-31 | End: 2023-08-31

## 2023-08-31 RX ORDER — HYDROCHLOROTHIAZIDE 12.5 MG/1
25 TABLET ORAL DAILY
Qty: 180 TABLET | Refills: 0 | Status: SHIPPED | OUTPATIENT
Start: 2023-08-31 | End: 2023-11-21

## 2023-08-31 ASSESSMENT — ENCOUNTER SYMPTOMS
HEMATOCHEZIA: 0
CONSTIPATION: 0
SORE THROAT: 0
PALPITATIONS: 0
NAUSEA: 0
CHILLS: 0
EYE PAIN: 0
MYALGIAS: 0
HEMATOCHEZIA: 1
HEARTBURN: 0
ABDOMINAL PAIN: 0
DYSURIA: 0
HEMATURIA: 0
SHORTNESS OF BREATH: 0
DIARRHEA: 0
FREQUENCY: 0
PARESTHESIAS: 0
JOINT SWELLING: 0
DIARRHEA: 1
COUGH: 0
WEAKNESS: 0
NERVOUS/ANXIOUS: 1
HEADACHES: 0
ARTHRALGIAS: 0
FEVER: 0
DIZZINESS: 0

## 2023-09-05 PROCEDURE — 82274 ASSAY TEST FOR BLOOD FECAL: CPT | Performed by: INTERNAL MEDICINE

## 2023-09-06 LAB — HEMOCCULT STL QL IA: NEGATIVE

## 2023-09-27 ENCOUNTER — MYC MEDICAL ADVICE (OUTPATIENT)
Dept: INTERNAL MEDICINE | Facility: CLINIC | Age: 53
End: 2023-09-27
Payer: COMMERCIAL

## 2023-09-27 DIAGNOSIS — E11.69 TYPE 2 DIABETES MELLITUS WITH OTHER SPECIFIED COMPLICATION, WITHOUT LONG-TERM CURRENT USE OF INSULIN (H): Primary | ICD-10-CM

## 2023-11-14 NOTE — PROGRESS NOTES
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 Moderate   (F17.200) (305.1)   Other (or unknown) Substance Use Disorder NA       Collateral Contact Summary   Number of contacts made: 2    Contact with referring person:  NA.    If court related records were reviewed, summarize here: NA    Information from collateral contacts supported/largely agreed with information from the client and associated risk ratings.      Rule 25 Assessment Summary and Plan   's Recommendation    Client is recommended to attend a co-occurring outpatient treatment program.      Collateral Contacts     Name:    Conchita Ram   Relationship:    [facility]   Phone Number:    [phone]  [phone] Releases:    Yes     Voicemail left 8/7/18 @ 5:54pm.      Collateral Contacts     Name:    Roni   Relationship:    Medical records   Phone Number:       Releases:         EHR was reviewed.    ollateral Contacts      A problematic pattern of alcohol/drug use leading to clinically significant impairment or distress, as manifested by at least two of the following, occurring within a 12-month period:    Alcohol/drug is often taken in larger amounts or over a longer period than was intended.  A great deal of time is spent in activities necessary to obtain alcohol,  use alcohol, or recover from its effects.  Craving, or a strong desire or urge to use alcohol/drug  Important social, occupational, or recreational activities are given up or reduced because of alcohol/drug use.  Recurrent alcohol/drug use in situations in which it is physically hazardous.  Alcohol/drug use is continued despite knowledge of having a persistent or recurrent physical or psychological problem that is likely to have been caused or exacerbated by alcohol.  Tolerance, as defined by either of the following: A need for markedly increased amounts of alcohol/drug to achieve intoxication or desired effect. and A markedly diminished effect with continued use of the same amount of alcohol/drug.  Withdrawal, as manifested by either of the following: The characteristic withdrawal syndrome for alcohol/drug (refer to Criteria A and B of the criteria set for alcohol/drug withdrawal). and Alcohol/drug (or a closely related substance, such as a benzodiazepine) is taken to relieve or avoid withdrawal symptoms.      Specify if: In early remission:  After full criteria for alcohol/drug use disorder were previously met, none of the criteria for alcohol/drug use disorder have been met for at least 3 months but for less than 12 months (with the exception that Criterion A4,  Craving or a strong desire or urge to use alcohol/drug  may be met).     In sustained remission:   After full criteria for alcohol use disorder were previously met, non of the criteria for alcohol/drug use disorder have been met at any time during a period of 12 months or longer (with the exception that Criterion A4,  Craving or strong desire or urge to use alcohol/drug  may be met).   Specify if:   This additional specifier is used if the individual is in an environment where access to alcohol is restricted.    Mild: Presence of 2-3 symptoms    Moderate: Presence of 4-5 symptoms    Severe: Presence of 6 or more symptoms       yes

## 2023-11-21 DIAGNOSIS — I10 UNCONTROLLED HYPERTENSION: ICD-10-CM

## 2023-11-21 DIAGNOSIS — I10 BENIGN ESSENTIAL HYPERTENSION: ICD-10-CM

## 2023-11-21 RX ORDER — HYDROCHLOROTHIAZIDE 12.5 MG/1
25 TABLET ORAL DAILY
Qty: 180 TABLET | Refills: 0 | Status: SHIPPED | OUTPATIENT
Start: 2023-11-21 | End: 2024-02-26

## 2023-11-21 RX ORDER — METOPROLOL SUCCINATE 100 MG/1
100 TABLET, EXTENDED RELEASE ORAL DAILY
Qty: 90 TABLET | Refills: 0 | Status: SHIPPED | OUTPATIENT
Start: 2023-11-21 | End: 2024-02-26

## 2023-11-21 RX ORDER — LISINOPRIL 40 MG/1
TABLET ORAL
Qty: 90 TABLET | Refills: 0 | Status: SHIPPED | OUTPATIENT
Start: 2023-11-21 | End: 2024-02-26

## 2023-11-27 ENCOUNTER — VIRTUAL VISIT (OUTPATIENT)
Dept: INTERNAL MEDICINE | Facility: CLINIC | Age: 53
End: 2023-11-27
Payer: COMMERCIAL

## 2023-11-27 DIAGNOSIS — E11.69 TYPE 2 DIABETES MELLITUS WITH OTHER SPECIFIED COMPLICATION, WITHOUT LONG-TERM CURRENT USE OF INSULIN (H): Primary | ICD-10-CM

## 2023-11-27 DIAGNOSIS — E66.01 MORBID OBESITY DUE TO EXCESS CALORIES (H): ICD-10-CM

## 2023-11-27 PROCEDURE — 99214 OFFICE O/P EST MOD 30 MIN: CPT | Mod: VID | Performed by: INTERNAL MEDICINE

## 2023-11-27 RX ORDER — METFORMIN HCL 500 MG
1500 TABLET, EXTENDED RELEASE 24 HR ORAL
Qty: 180 TABLET | Refills: 1 | Status: CANCELLED | OUTPATIENT
Start: 2023-11-27

## 2023-11-27 ASSESSMENT — PATIENT HEALTH QUESTIONNAIRE - PHQ9
SUM OF ALL RESPONSES TO PHQ QUESTIONS 1-9: 4
SUM OF ALL RESPONSES TO PHQ QUESTIONS 1-9: 4
10. IF YOU CHECKED OFF ANY PROBLEMS, HOW DIFFICULT HAVE THESE PROBLEMS MADE IT FOR YOU TO DO YOUR WORK, TAKE CARE OF THINGS AT HOME, OR GET ALONG WITH OTHER PEOPLE: NOT DIFFICULT AT ALL

## 2023-11-27 NOTE — PROGRESS NOTES
"Homer is a 53 year old who is being evaluated via a billable video visit.      How would you like to obtain your AVS? MyChart  If the video visit is dropped, the invitation should be resent by: Text to cell phone: [phone]  Will anyone else be joining your video visit? No        Assessment & Plan     Type 2 diabetes mellitus with other specified complication, without long-term current use of insulin (H)  Orders placed for A1c level.  Discussed increasing metformin to 2000 mg daily and Ozempic to 1 mg weekly.  - Hemoglobin A1c; Future    Morbid obesity due to excess calories (H):BMI 40-50 w comorbid HTN   Encouraged ongoing weight loss and weight reduction as well as following his weight so we can see if he is having any success from his Ozempic.    Recommended discussed and supported updated routine vaccines      BMI:   Estimated body mass index is 47.84 kg/m  as calculated from the following:    Height as of 8/31/23: 1.803 m (5' 11\").    Weight as of 8/31/23: 155.6 kg (343 lb).   Weight management plan: Discussed healthy diet and exercise guidelines    See Patient Instructions    Piter Jefferson MD  [facility]   Homer is a 53 year old, presenting for the following health issues:  Diabetes and Hypertension    States that his blood sugars of just recently in the last couple weeks started to significantly come down.  Reports now just an hour after eating his blood sugars are 120.  He has been currently taking his metformin 2 tablets in the AM 1 in the PM along with Ozempic at 0.5 mg/week.  He is unsure as he does not check his weight but feels he has lost weight.  He is interested in potentially altering the dose of his medication but would like to check his A1c first  History of Present Illness       Diabetes:   He presents for follow up of diabetes.  He is checking home blood glucose two times daily.   He checks blood glucose before and after meals.  Blood " glucose is never over 200 and never under 70. He is aware of hypoglycemia symptoms including none.    He has no concerns regarding his diabetes at this time.   He is not experiencing numbness or burning in feet, excessive thirst, blurry vision, weight changes or redness, sores or blisters on feet.           Hypertension: He presents for follow up of hypertension.  He does check blood pressure  regularly outside of the clinic. Outside blood pressures have been over 140/90. He does not follow a low salt diet.     He eats 2-3 servings of fruits and vegetables daily.He consumes 1 sweetened beverage(s) daily.He exercises with enough effort to increase his heart rate 10 to 19 minutes per day.  He exercises with enough effort to increase his heart rate 3 or less days per week.   He is taking medications regularly.       Current Outpatient Medications   Medication    blood glucose (NO BRAND SPECIFIED) test strip    blood glucose monitoring (NO BRAND SPECIFIED) meter device kit    hydrochlorothiazide (HYDRODIURIL) 12.5 MG tablet    lisinopril (ZESTRIL) 40 MG tablet    metFORMIN (GLUCOPHAGE XR) 500 MG 24 hr tablet    metoprolol succinate ER (TOPROL XL) 100 MG 24 hr tablet    Multiple Vitamin (MULTI VITAMIN MENS PO)    PARoxetine (PAXIL) 30 MG tablet    semaglutide (OZEMPIC) 2 MG/3ML pen    thin (NO BRAND SPECIFIED) lancets     No current facility-administered medications for this visit.         Review of Systems   CONSTITUTIONAL: NEGATIVE for fever, chills, change in weight  EYES: NEGATIVE for vision changes or irritation  ENT/MOUTH: NEGATIVE for ear, mouth and throat problems  RESP: NEGATIVE for significant cough or SOB  CV: NEGATIVE for chest pain, palpitations or peripheral edema  GI: NEGATIVE for nausea, abdominal pain, heartburn, or change in bowel habits  : NEGATIVE for frequency, dysuria, or hematuria  MUSCULOSKELETAL: NEGATIVE for significant arthralgias or myalgia  NEURO: NEGATIVE for weakness, dizziness or  "paresthesias  HEME: NEGATIVE for bleeding problems  PSYCHIATRIC: NEGATIVE for changes in mood or affect      Objective    Vitals - Patient Reported  Weight (Patient Reported): 149.7 kg (330 lb)  Height (Patient Reported): 185.4 cm (6' 1\")  BMI (Based on Pt Reported Ht/Wt): 43.54    Vitals:  No vitals were obtained today due to virtual visit.    Physical Exam   GENERAL:  alert and no distress  EYES: Eyes grossly normal to inspection.  No discharge or erythema, or obvious scleral/conjunctival abnormalities.  RESP: No audible wheeze, cough, or visible cyanosis.  No visible retractions or increased work of breathing.    NEURO: Cranial nerves grossly intact.  Mentation and speech appropriate for age.  PSYCH: Mentation appears normal, affect normal/bright, judgement and insight intact, normal speech and appearance well-groomed.      Lab Results   Component Value Date    A1C 8.6 08/31/2023    A1C 9.0 06/01/2023    A1C 6.9 06/24/2022    A1C 7.0 09/22/2021    A1C 7.7 03/27/2021    A1C 6.7 01/05/2021    A1C 6.4 04/20/2020    A1C 5.4 11/26/2019     LDL Cholesterol Calculated   Date Value Ref Range Status   06/01/2023 100 <=100 mg/dL Final   03/27/2021 98 <100 mg/dL Final     Comment:     Desirable:       <100 mg/dl     Creatinine   Date Value Ref Range Status   06/01/2023 0.80 0.67 - 1.17 mg/dL Final   03/27/2021 0.82 0.66 - 1.25 mg/dL Final     Lab Results   Component Value Date    ALT 37 06/01/2023    ALT 69 03/27/2021           Video-Visit Details    Type of service:  Video Visit     Originating Location (pt. Location): Home    Distant Location (provider location):  On-site  Platform used for Video Visit: Maureen"

## 2023-12-03 DIAGNOSIS — E11.69 TYPE 2 DIABETES MELLITUS WITH OTHER SPECIFIED COMPLICATION, WITHOUT LONG-TERM CURRENT USE OF INSULIN (H): ICD-10-CM

## 2023-12-04 RX ORDER — SEMAGLUTIDE 0.68 MG/ML
0.5 INJECTION, SOLUTION SUBCUTANEOUS
Qty: 3 ML | Refills: 0 | Status: SHIPPED | OUTPATIENT
Start: 2023-12-04 | End: 2023-12-29

## 2023-12-11 ENCOUNTER — LAB (OUTPATIENT)
Dept: LAB | Facility: CLINIC | Age: 53
End: 2023-12-11
Payer: COMMERCIAL

## 2023-12-11 DIAGNOSIS — E11.69 TYPE 2 DIABETES MELLITUS WITH OTHER SPECIFIED COMPLICATION, WITHOUT LONG-TERM CURRENT USE OF INSULIN (H): ICD-10-CM

## 2023-12-11 LAB — HBA1C MFR BLD: 6.2 % (ref 0–5.6)

## 2023-12-11 PROCEDURE — 83036 HEMOGLOBIN GLYCOSYLATED A1C: CPT

## 2023-12-11 PROCEDURE — 36415 COLL VENOUS BLD VENIPUNCTURE: CPT

## 2023-12-23 DIAGNOSIS — E11.69 TYPE 2 DIABETES MELLITUS WITH OTHER SPECIFIED COMPLICATION, WITHOUT LONG-TERM CURRENT USE OF INSULIN (H): ICD-10-CM

## 2023-12-26 RX ORDER — METFORMIN HCL 500 MG
TABLET, EXTENDED RELEASE 24 HR ORAL
Qty: 180 TABLET | Refills: 0 | OUTPATIENT
Start: 2023-12-26

## 2023-12-27 DIAGNOSIS — E11.69 TYPE 2 DIABETES MELLITUS WITH OTHER SPECIFIED COMPLICATION, WITHOUT LONG-TERM CURRENT USE OF INSULIN (H): ICD-10-CM

## 2023-12-27 RX ORDER — METFORMIN HCL 500 MG
TABLET, EXTENDED RELEASE 24 HR ORAL
Qty: 270 TABLET | Refills: 0 | Status: SHIPPED | OUTPATIENT
Start: 2023-12-27 | End: 2024-03-28

## 2023-12-27 NOTE — TELEPHONE ENCOUNTER
Prescription approved per [facility] Refill Protocol.  Paola Montes, RN  [facility] Triage Nurse

## 2023-12-29 DIAGNOSIS — E11.69 TYPE 2 DIABETES MELLITUS WITH OTHER SPECIFIED COMPLICATION, WITHOUT LONG-TERM CURRENT USE OF INSULIN (H): ICD-10-CM

## 2023-12-29 RX ORDER — SEMAGLUTIDE 0.68 MG/ML
0.5 INJECTION, SOLUTION SUBCUTANEOUS
Qty: 3 ML | Refills: 0 | Status: SHIPPED | OUTPATIENT
Start: 2023-12-29 | End: 2024-01-10

## 2023-12-29 NOTE — TELEPHONE ENCOUNTER
Prescription approved per [facility] Refill Protocol.  Meliza Hernandez RN  [facility]

## 2024-01-10 ENCOUNTER — MYC REFILL (OUTPATIENT)
Dept: INTERNAL MEDICINE | Facility: CLINIC | Age: 54
End: 2024-01-10

## 2024-01-10 ENCOUNTER — OFFICE VISIT (OUTPATIENT)
Dept: URGENT CARE | Facility: URGENT CARE | Age: 54
End: 2024-01-10
Payer: COMMERCIAL

## 2024-01-10 VITALS
TEMPERATURE: 97.7 F | DIASTOLIC BLOOD PRESSURE: 109 MMHG | OXYGEN SATURATION: 94 % | SYSTOLIC BLOOD PRESSURE: 169 MMHG | HEART RATE: 70 BPM

## 2024-01-10 DIAGNOSIS — E11.69 TYPE 2 DIABETES MELLITUS WITH OTHER SPECIFIED COMPLICATION, WITHOUT LONG-TERM CURRENT USE OF INSULIN (H): ICD-10-CM

## 2024-01-10 DIAGNOSIS — M54.50 ACUTE LEFT-SIDED LOW BACK PAIN WITHOUT SCIATICA: Primary | ICD-10-CM

## 2024-01-10 PROCEDURE — 99213 OFFICE O/P EST LOW 20 MIN: CPT | Performed by: PHYSICIAN ASSISTANT

## 2024-01-10 RX ORDER — CYCLOBENZAPRINE HCL 5 MG
5 TABLET ORAL 3 TIMES DAILY PRN
Qty: 15 TABLET | Refills: 0 | Status: SHIPPED | OUTPATIENT
Start: 2024-01-10 | End: 2024-01-17

## 2024-01-10 NOTE — PATIENT INSTRUCTIONS
Musculoskeletal back pain usually gets better within 2 weeks to 2 months regardless of what you do.  Some of these things below may help with your symptoms.    First follow-up with physical therapy    Heat, gentle range of motion, stretching and massage.  Modify activity.  Mild pain is okay as long as it resolves after a minute or 2 of discontinuing the activity.  Decrease level activity if pain is moderate to severe or last longer than a few minutes after discontinuing.    You can use the muscle relaxant as needed but it will make you drowsy so do not drink alcohol or drive while taking this medication.  Should be used sparingly.  I usually recommend only using it at night to potentially help you sleep.    Ibuprofen 400-600 mg (2-3 of the 200 mg OTC tablets or 400-600 mg of the children's liquid) up to 4 times daily with food or milk  Tylenol 500-1000 mg every 8 hours as needed

## 2024-01-10 NOTE — PROGRESS NOTES
Chief Complaint   Patient presents with    Back Pain     Pt reports lower left back pain when moving since this morning.       ASSESSMENT/PLAN:  Homer was seen today for back pain.    Diagnoses and all orders for this visit:    Acute left-sided low back pain without sciatica  -     cyclobenzaprine (FLEXERIL) 5 MG tablet; Take 1 tablet (5 mg) by mouth 3 times daily as needed for muscle spasms  -     Physical Therapy Referral; Future    Musculoskeletal back pain usually gets better within 2 weeks to 2 months regardless of what you do.  Some of these things below may help with your symptoms.    First follow-up with physical therapy    Heat, gentle range of motion, stretching and massage.  Modify activity.  Mild pain is okay as long as it resolves after a minute or 2 of discontinuing the activity.  Decrease level activity if pain is moderate to severe or last longer than a few minutes after discontinuing.    You can use the muscle relaxant as needed but it will make you drowsy so do not drink alcohol or drive while taking this medication.  Should be used sparingly.  I usually recommend only using it at night to potentially help you sleep.    Ibuprofen 400-600 mg (2-3 of the 200 mg OTC tablets or 400-600 mg of the children's liquid) up to 4 times daily with food or milk  Tylenol 500-1000 mg every 8 hours as needed    Abhi Burton PA-C      SUBJECTIVE:  Homer is a 53 year old male who presents to urgent care with a week of left-sided low back pain.  It was relatively manageable at first but over the last 2 days has become significantly worse.  He gets sharp spastic pain and he will not be able to move until it improves.  It has been a little bit better after being very bad this morning.  Worse with certain movements.  No weakness, paresthesias, urinary issues, no acute injury, no fevers or chills.    ROS: Pertinent ROS neg other than the symptoms noted above in the HPI.     OBJECTIVE:  BP (!) 169/109   Pulse 70    Temp 97.7  F (36.5  C) (Tympanic)   SpO2 94%    GENERAL: healthy, alert and no distress  MS: no gross musculoskeletal defects noted, no edema, no midline tenderness, diffuse left paravertebral lumbar tenderness, pain with twisting to the left  SKIN: no suspicious lesions or rashes, no foot drop, normal gait  NEURO: Normal strength and tone, mentation intact and speech normal    DIAGNOSTICS    No results found for any visits on 01/10/24.     Current Outpatient Medications   Medication    blood glucose (NO BRAND SPECIFIED) test strip    blood glucose monitoring (NO BRAND SPECIFIED) meter device kit    hydrochlorothiazide (HYDRODIURIL) 12.5 MG tablet    lisinopril (ZESTRIL) 40 MG tablet    metFORMIN (GLUCOPHAGE XR) 500 MG 24 hr tablet    metoprolol succinate ER (TOPROL XL) 100 MG 24 hr tablet    Multiple Vitamin (MULTI VITAMIN MENS PO)    OZEMPIC, 0.25 OR 0.5 MG/DOSE, 2 MG/3ML pen    PARoxetine (PAXIL) 30 MG tablet    thin (NO BRAND SPECIFIED) lancets     No current facility-administered medications for this visit.      Patient Active Problem List   Diagnosis    Lipoma    Anxiety state    Sinus tachycardia    Chronic rhinitis    Personal history of DVT (deep vein thrombosis)    Acute right-sided low back pain without sciatica    Morbid obesity due to excess calories (H):BMI 40-50 w comorbid HTN     Gout, unspecified cause, unspecified chronicity, unspecified site    Personal history of tobacco use, presenting hazards to health: 18-28y/o @ 1 -2ppd=25-30 pk yr hx    Obstructive sleep apnea syndrome    Alcohol abuse since teens -off 1999-2012-restart     Family history of colon cancer    Impaired fasting glucose    Benign essential hypertension- uncontrolled on meds     Screening for prostate cancer    Alcohol-induced insomnia (H)    Persistent insomnia    Major depressive disorder, recurrent episode, mild (H24)    Family history of diabetes mellitus    Acute alcoholic hepatitis (H28)    Jaundice    Elevated liver  enzymes    Hypertriglyceridemia    Type 2 diabetes mellitus with other specified complication, without long-term current use of insulin (H)    Alcoholism (H)      Past Medical History:   Diagnosis Date    Arthritis     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, unspecified laterality, sequela     Depressive disorder 2001    Diabetes (H) 2020    Gout     Hypertension 2012     Past Surgical History:   Procedure Laterality Date    ABDOMEN SURGERY  1999    CHOLECYSTECTOMY  1999    COLONOSCOPY  2019    ORTHOPEDIC SURGERY  2013     Family History   Problem Relation Age of Onset    Substance Abuse Mother     Dementia Mother     Hypertension Father     Diabetes Father     Cancer - colorectal Father     Unknown/Adopted Father     Substance Abuse Maternal Grandmother     Substance Abuse Maternal Grandfather     No Known Problems Paternal Grandmother     Substance Abuse Paternal Grandfather     No Known Problems Brother     Substance Abuse Sister     No Known Problems Son     No Known Problems Daughter     No Known Problems Other     Coronary Artery Disease No family hx of     Hyperlipidemia No family hx of     Cerebrovascular Disease No family hx of     Breast Cancer No family hx of     Colon Cancer No family hx of     Prostate Cancer No family hx of     Other Cancer No family hx of     Depression No family hx of     Anxiety Disorder No family hx of     Mental Illness No family hx of     Anesthesia Reaction No family hx of     Asthma No family hx of     Osteoporosis No family hx of     Genetic Disorder No family hx of     Thyroid Disease No family hx of     Obesity No family hx of     Schizophrenia No family hx of     Bipolar Disorder No family hx of     Suicide No family hx of     Emily Disease No family hx of     Parkinsonism No family hx of     Autism Spectrum Disorder No family hx of     Intellectual Disability (Mental Retardation) No family hx of      Social History     Tobacco Use    Smoking status:  Former     Packs/day: 0.00     Years: 0.00     Additional pack years: 0.00     Total pack years: 0.00     Types: Cigarettes     Quit date: 1999     Years since quittin.0    Smokeless tobacco: Current     Types: Snuff   Substance Use Topics    Alcohol use: Yes              The plan of care was discussed with the patient. They understand and agree with the course of treatment prescribed. A printed summary was given including instructions and medications.  The use of Dragon/Gini [employer] Jony dictation services may have been used to construct the content in this note; any grammatical or spelling errors are non-intentional. Please contact the author of this note directly if you are in need of any clarification.

## 2024-01-11 RX ORDER — SEMAGLUTIDE 0.68 MG/ML
0.5 INJECTION, SOLUTION SUBCUTANEOUS
Qty: 3 ML | Refills: 0 | Status: SHIPPED | OUTPATIENT
Start: 2024-01-11 | End: 2024-01-29

## 2024-01-11 NOTE — TELEPHONE ENCOUNTER
Per office visit note from 11/27/23...        Will route refill request to PCP for review. Continue with 0.5 mg weekly or increase to 1 mg weekly?    Thank you,  Paola Montes RN

## 2024-01-17 DIAGNOSIS — M54.50 ACUTE LEFT-SIDED LOW BACK PAIN WITHOUT SCIATICA: ICD-10-CM

## 2024-01-17 RX ORDER — CYCLOBENZAPRINE HCL 5 MG
5 TABLET ORAL 3 TIMES DAILY PRN
Qty: 15 TABLET | Refills: 0 | Status: SHIPPED | OUTPATIENT
Start: 2024-01-17 | End: 2024-06-10

## 2024-01-17 NOTE — TELEPHONE ENCOUNTER
Rx'd at  visit 1-  PCP = Ronny from     No future OV scheduled    Please review, approve/deny as appropriate    Task as needed to your staff    RT Kevin (R)

## 2024-01-27 DIAGNOSIS — E11.69 TYPE 2 DIABETES MELLITUS WITH OTHER SPECIFIED COMPLICATION, WITHOUT LONG-TERM CURRENT USE OF INSULIN (H): ICD-10-CM

## 2024-01-29 RX ORDER — SEMAGLUTIDE 0.68 MG/ML
0.5 INJECTION, SOLUTION SUBCUTANEOUS
Qty: 3 ML | Refills: 0 | Status: SHIPPED | OUTPATIENT
Start: 2024-01-29 | End: 2024-02-26

## 2024-02-20 DIAGNOSIS — F33.0 MAJOR DEPRESSIVE DISORDER, RECURRENT EPISODE, MILD (H): ICD-10-CM

## 2024-02-20 RX ORDER — PAROXETINE 30 MG/1
30 TABLET, FILM COATED ORAL EVERY MORNING
Qty: 90 TABLET | Refills: 0 | Status: SHIPPED | OUTPATIENT
Start: 2024-02-20 | End: 2024-05-20

## 2024-02-26 DIAGNOSIS — I10 UNCONTROLLED HYPERTENSION: ICD-10-CM

## 2024-02-26 DIAGNOSIS — I10 BENIGN ESSENTIAL HYPERTENSION: ICD-10-CM

## 2024-02-26 DIAGNOSIS — E11.69 TYPE 2 DIABETES MELLITUS WITH OTHER SPECIFIED COMPLICATION, WITHOUT LONG-TERM CURRENT USE OF INSULIN (H): ICD-10-CM

## 2024-02-26 RX ORDER — METOPROLOL SUCCINATE 100 MG/1
100 TABLET, EXTENDED RELEASE ORAL DAILY
Qty: 90 TABLET | Refills: 0 | Status: SHIPPED | OUTPATIENT
Start: 2024-02-26 | End: 2024-03-04

## 2024-02-26 RX ORDER — HYDROCHLOROTHIAZIDE 12.5 MG/1
25 TABLET ORAL DAILY
Qty: 180 TABLET | Refills: 0 | Status: SHIPPED | OUTPATIENT
Start: 2024-02-26 | End: 2024-05-23

## 2024-02-26 RX ORDER — LISINOPRIL 40 MG/1
TABLET ORAL
Qty: 90 TABLET | Refills: 0 | Status: SHIPPED | OUTPATIENT
Start: 2024-02-26 | End: 2024-05-23

## 2024-02-26 RX ORDER — SEMAGLUTIDE 0.68 MG/ML
0.5 INJECTION, SOLUTION SUBCUTANEOUS
Qty: 3 ML | Refills: 0 | Status: SHIPPED | OUTPATIENT
Start: 2024-02-26 | End: 2024-03-19

## 2024-03-01 DIAGNOSIS — I10 UNCONTROLLED HYPERTENSION: ICD-10-CM

## 2024-03-04 RX ORDER — METOPROLOL SUCCINATE 100 MG/1
100 TABLET, EXTENDED RELEASE ORAL DAILY
Qty: 90 TABLET | Refills: 0 | Status: SHIPPED | OUTPATIENT
Start: 2024-03-04 | End: 2024-05-29

## 2024-03-04 NOTE — TELEPHONE ENCOUNTER
Prescription approved per [facility] Refill Protocol.  Meliza Hernandez RN  [facility]

## 2024-03-19 DIAGNOSIS — E11.69 TYPE 2 DIABETES MELLITUS WITH OTHER SPECIFIED COMPLICATION, WITHOUT LONG-TERM CURRENT USE OF INSULIN (H): ICD-10-CM

## 2024-03-19 RX ORDER — SEMAGLUTIDE 0.68 MG/ML
0.5 INJECTION, SOLUTION SUBCUTANEOUS
Qty: 9 ML | Refills: 0 | Status: SHIPPED | OUTPATIENT
Start: 2024-03-19 | End: 2024-07-09

## 2024-03-28 DIAGNOSIS — E11.69 TYPE 2 DIABETES MELLITUS WITH OTHER SPECIFIED COMPLICATION, WITHOUT LONG-TERM CURRENT USE OF INSULIN (H): ICD-10-CM

## 2024-03-28 RX ORDER — METFORMIN HCL 500 MG
TABLET, EXTENDED RELEASE 24 HR ORAL
Qty: 270 TABLET | Refills: 0 | Status: SHIPPED | OUTPATIENT
Start: 2024-03-28 | End: 2024-06-24

## 2024-04-07 ENCOUNTER — HEALTH MAINTENANCE LETTER (OUTPATIENT)
Age: 54
End: 2024-04-07

## 2024-04-15 DIAGNOSIS — E11.69 TYPE 2 DIABETES MELLITUS WITH OTHER SPECIFIED COMPLICATION, WITHOUT LONG-TERM CURRENT USE OF INSULIN (H): ICD-10-CM

## 2024-04-16 RX ORDER — SEMAGLUTIDE 0.68 MG/ML
INJECTION, SOLUTION SUBCUTANEOUS
Qty: 3 ML | Refills: 0 | OUTPATIENT
Start: 2024-04-16

## 2024-05-18 DIAGNOSIS — F33.0 MAJOR DEPRESSIVE DISORDER, RECURRENT EPISODE, MILD (H): ICD-10-CM

## 2024-05-20 RX ORDER — PAROXETINE 30 MG/1
30 TABLET, FILM COATED ORAL EVERY MORNING
Qty: 90 TABLET | Refills: 0 | Status: SHIPPED | OUTPATIENT
Start: 2024-05-20 | End: 2024-08-19

## 2024-05-23 DIAGNOSIS — I10 BENIGN ESSENTIAL HYPERTENSION: ICD-10-CM

## 2024-05-23 DIAGNOSIS — I10 UNCONTROLLED HYPERTENSION: ICD-10-CM

## 2024-05-23 RX ORDER — LISINOPRIL 40 MG/1
TABLET ORAL
Qty: 90 TABLET | Refills: 0 | Status: SHIPPED | OUTPATIENT
Start: 2024-05-23 | End: 2024-08-19

## 2024-05-23 RX ORDER — HYDROCHLOROTHIAZIDE 12.5 MG/1
25 TABLET ORAL DAILY
Qty: 180 TABLET | Refills: 0 | Status: SHIPPED | OUTPATIENT
Start: 2024-05-23 | End: 2024-08-19

## 2024-05-29 DIAGNOSIS — I10 UNCONTROLLED HYPERTENSION: ICD-10-CM

## 2024-05-29 RX ORDER — METOPROLOL SUCCINATE 100 MG/1
100 TABLET, EXTENDED RELEASE ORAL DAILY
Qty: 90 TABLET | Refills: 0 | Status: SHIPPED | OUTPATIENT
Start: 2024-05-29 | End: 2024-08-20

## 2024-06-24 DIAGNOSIS — E11.69 TYPE 2 DIABETES MELLITUS WITH OTHER SPECIFIED COMPLICATION, WITHOUT LONG-TERM CURRENT USE OF INSULIN (H): ICD-10-CM

## 2024-06-24 RX ORDER — METFORMIN HCL 500 MG
TABLET, EXTENDED RELEASE 24 HR ORAL
Qty: 270 TABLET | Refills: 0 | Status: SHIPPED | OUTPATIENT
Start: 2024-06-24 | End: 2024-09-23

## 2024-06-24 NOTE — TELEPHONE ENCOUNTER
Patient called. Provider's message given. Visit Scheduled.     Appointments in Next Year      Sep 03, 2024 10:00 AM  (Arrive by 9:40 AM)  Annual Wellness Visit with Piter Jefferson MD  [facility] ) [phone]

## 2024-06-24 NOTE — TELEPHONE ENCOUNTER
I have filled prescription but patient will need a clinic appointment prior to next refill request.  Please help patient's schedule such

## 2024-06-24 NOTE — TELEPHONE ENCOUNTER
Patient Contact    Attempt # 1    Was call answered?  No.  Left message on voicemail with information to call back.    Debra Fierro RN

## 2024-07-08 DIAGNOSIS — E11.69 TYPE 2 DIABETES MELLITUS WITH OTHER SPECIFIED COMPLICATION, WITHOUT LONG-TERM CURRENT USE OF INSULIN (H): ICD-10-CM

## 2024-07-08 NOTE — LETTER
AMY [address] [address]  [phone]  July 9, 2024  Homer Wells  [address]  [place] [address]    Dear Homer,    I am contacting you regarding the refill request we received for you. After reviewing your chart it looks like you are overdue for your annual and for a med check. Please call [phone] or schedule this through my chart to continue to receive refills. If you anticipate running out before your appointment let us know and we can send in a amy refill.       Thank you,     AMY [facility] nursing staff

## 2024-07-09 RX ORDER — SEMAGLUTIDE 0.68 MG/ML
0.5 INJECTION, SOLUTION SUBCUTANEOUS
Qty: 9 ML | Refills: 1 | Status: SHIPPED | OUTPATIENT
Start: 2024-07-09 | End: 2024-09-03 | Stop reason: DRUGHIGH

## 2024-07-10 RX ORDER — SEMAGLUTIDE 0.68 MG/ML
0.5 INJECTION, SOLUTION SUBCUTANEOUS
Qty: 9 ML | Refills: 0 | OUTPATIENT
Start: 2024-07-10

## 2024-07-10 NOTE — TELEPHONE ENCOUNTER
Pharmacy called for follow up on request. Returned call to [facility] pharmacy and left message that medication has been refused and that patient needs follow up appt. for dose change. Asked pharmacy to notify patient if needed and that we have also sent him notification. Ekaterina Hoover, CMA

## 2024-07-15 ENCOUNTER — TELEPHONE (OUTPATIENT)
Dept: INTERNAL MEDICINE | Facility: CLINIC | Age: 54
End: 2024-07-15
Payer: COMMERCIAL

## 2024-07-15 NOTE — TELEPHONE ENCOUNTER
"Per Pharamcy    \" A Pa has been started for semaglutide (OZEMPIC, 0.25 OR 0.5 MG/DOSE,) 2 MG/3ML pen.    Login to [employer]/login and enter key.    Key [id number]    C[address]83  "

## 2024-07-16 NOTE — TELEPHONE ENCOUNTER
Refill team does not initiate prior authorization's routing to care team to initiate prior authorization.

## 2024-07-16 NOTE — TELEPHONE ENCOUNTER
Prior Authorization Retail Medication Request    Medication/Dose: semaglutide (OZEMPIC, 0.25 OR 0.5 MG/DOSE,) 2 MG/3ML pen  Diagnosis and ICD code (if different than what is on RX):    Type 2 diabetes mellitus with other specified complication, without long-term current use of insulin (H) [E11.69]        Insurance   Payor: [facility]  Plan: [facility] OUT OF STATE  Sponsor Code: [id number]  Subscriber ID: [id number]         Pharmacy Information (if different than what is on RX)  Name:  [facility]  Phone:  [phone]   Fax: [phone]

## 2024-07-17 ENCOUNTER — LAB (OUTPATIENT)
Dept: LAB | Facility: CLINIC | Age: 54
End: 2024-07-17
Payer: COMMERCIAL

## 2024-07-17 DIAGNOSIS — E11.69 TYPE 2 DIABETES MELLITUS WITH OTHER SPECIFIED COMPLICATION, WITHOUT LONG-TERM CURRENT USE OF INSULIN (H): ICD-10-CM

## 2024-07-17 LAB — HBA1C MFR BLD: 8.1 % (ref 0–5.6)

## 2024-07-17 PROCEDURE — 83036 HEMOGLOBIN GLYCOSYLATED A1C: CPT

## 2024-07-17 PROCEDURE — 36415 COLL VENOUS BLD VENIPUNCTURE: CPT

## 2024-07-18 NOTE — TELEPHONE ENCOUNTER
Retail Pharmacy Prior Authorization Team   Phone: [phone]    PA Initiation    Medication: OZEMPIC (0.25 OR 0.5 MG/DOSE) 2 MG/3ML SC SOPN  Insurance Company: [employer] - Phone [phone] Fax [phone]  Pharmacy Filling the Rx: [address]  Filling Pharmacy Phone: [phone]  Filling Pharmacy Fax:    Start Date: 7/18/2024

## 2024-07-18 NOTE — TELEPHONE ENCOUNTER
Prior Authorization Approval    Authorization Effective Date: 6/18/2024  Authorization Expiration Date: 7/18/2025  Medication: semaglutide (OZEMPIC, 0.25 OR 0.5 MG/DOSE,) 2 MG/3ML pen-APPROVED  Reference #:     Insurance Company: [employer] - Phone [phone] Fax [phone]  Which Pharmacy is filling the prescription (Not needed for infusion/clinic administered): [facility] [address] [place] [address]  Pharmacy Notified: Yes  Patient Notified: Instructed pharmacy to notify patient when script is ready to /ship.

## 2024-08-19 DIAGNOSIS — F33.0 MAJOR DEPRESSIVE DISORDER, RECURRENT EPISODE, MILD (H): ICD-10-CM

## 2024-08-19 DIAGNOSIS — I10 UNCONTROLLED HYPERTENSION: ICD-10-CM

## 2024-08-19 DIAGNOSIS — I10 BENIGN ESSENTIAL HYPERTENSION: ICD-10-CM

## 2024-08-19 RX ORDER — PAROXETINE 30 MG/1
30 TABLET, FILM COATED ORAL EVERY MORNING
Qty: 90 TABLET | Refills: 0 | Status: SHIPPED | OUTPATIENT
Start: 2024-08-19

## 2024-08-19 RX ORDER — LISINOPRIL 40 MG/1
TABLET ORAL
Qty: 90 TABLET | Refills: 0 | Status: SHIPPED | OUTPATIENT
Start: 2024-08-19

## 2024-08-19 RX ORDER — HYDROCHLOROTHIAZIDE 12.5 MG/1
25 TABLET ORAL DAILY
Qty: 180 TABLET | Refills: 0 | Status: SHIPPED | OUTPATIENT
Start: 2024-08-19

## 2024-08-20 RX ORDER — METOPROLOL SUCCINATE 100 MG/1
100 TABLET, EXTENDED RELEASE ORAL DAILY
Qty: 90 TABLET | Refills: 0 | Status: SHIPPED | OUTPATIENT
Start: 2024-08-20

## 2024-09-03 ENCOUNTER — OFFICE VISIT (OUTPATIENT)
Dept: INTERNAL MEDICINE | Facility: CLINIC | Age: 54
End: 2024-09-03
Payer: COMMERCIAL

## 2024-09-03 VITALS
SYSTOLIC BLOOD PRESSURE: 180 MMHG | TEMPERATURE: 98 F | DIASTOLIC BLOOD PRESSURE: 111 MMHG | BODY MASS INDEX: 44.1 KG/M2 | RESPIRATION RATE: 18 BRPM | HEIGHT: 71 IN | OXYGEN SATURATION: 95 % | HEART RATE: 74 BPM | WEIGHT: 315 LBS

## 2024-09-03 DIAGNOSIS — Z00.00 ROUTINE GENERAL MEDICAL EXAMINATION AT A HEALTH CARE FACILITY: Primary | ICD-10-CM

## 2024-09-03 DIAGNOSIS — Z12.5 SCREENING FOR PROSTATE CANCER: ICD-10-CM

## 2024-09-03 DIAGNOSIS — E78.5 HYPERLIPIDEMIA LDL GOAL <100: ICD-10-CM

## 2024-09-03 DIAGNOSIS — Z12.11 COLON CANCER SCREENING: ICD-10-CM

## 2024-09-03 DIAGNOSIS — L91.8 SKIN TAG: ICD-10-CM

## 2024-09-03 DIAGNOSIS — F33.0 MAJOR DEPRESSIVE DISORDER, RECURRENT EPISODE, MILD (H): ICD-10-CM

## 2024-09-03 DIAGNOSIS — E66.01 MORBID OBESITY DUE TO EXCESS CALORIES (H): ICD-10-CM

## 2024-09-03 DIAGNOSIS — E11.69 TYPE 2 DIABETES MELLITUS WITH OTHER SPECIFIED COMPLICATION, WITHOUT LONG-TERM CURRENT USE OF INSULIN (H): ICD-10-CM

## 2024-09-03 DIAGNOSIS — I10 BENIGN ESSENTIAL HYPERTENSION: ICD-10-CM

## 2024-09-03 DIAGNOSIS — K70.10 ACUTE ALCOHOLIC HEPATITIS (H): ICD-10-CM

## 2024-09-03 PROBLEM — F10.20 ALCOHOLISM (H): Status: RESOLVED | Noted: 2023-05-31 | Resolved: 2024-09-03

## 2024-09-03 PROBLEM — F10.982 ALCOHOL-INDUCED INSOMNIA (H): Status: RESOLVED | Noted: 2018-05-04 | Resolved: 2024-09-03

## 2024-09-03 LAB
ALBUMIN SERPL BCG-MCNC: 4.5 G/DL (ref 3.5–5.2)
ALP SERPL-CCNC: 72 U/L (ref 40–150)
ALT SERPL W P-5'-P-CCNC: 67 U/L (ref 0–70)
ANION GAP SERPL CALCULATED.3IONS-SCNC: 13 MMOL/L (ref 7–15)
AST SERPL W P-5'-P-CCNC: 55 U/L (ref 0–45)
BILIRUB SERPL-MCNC: 0.6 MG/DL
BUN SERPL-MCNC: 10.3 MG/DL (ref 6–20)
CALCIUM SERPL-MCNC: 9.6 MG/DL (ref 8.8–10.4)
CHLORIDE SERPL-SCNC: 97 MMOL/L (ref 98–107)
CHOLEST SERPL-MCNC: 164 MG/DL
CREAT SERPL-MCNC: 0.77 MG/DL (ref 0.67–1.17)
CREAT UR-MCNC: 114 MG/DL
EGFRCR SERPLBLD CKD-EPI 2021: >90 ML/MIN/1.73M2
ERYTHROCYTE [DISTWIDTH] IN BLOOD BY AUTOMATED COUNT: 12.7 % (ref 10–15)
FASTING STATUS PATIENT QL REPORTED: YES
FASTING STATUS PATIENT QL REPORTED: YES
GLUCOSE SERPL-MCNC: 248 MG/DL (ref 70–99)
HBA1C MFR BLD: 8.4 % (ref 0–5.6)
HCO3 SERPL-SCNC: 27 MMOL/L (ref 22–29)
HCT VFR BLD AUTO: 45.9 % (ref 40–53)
HDLC SERPL-MCNC: 38 MG/DL
HGB BLD-MCNC: 14.9 G/DL (ref 13.3–17.7)
LDLC SERPL CALC-MCNC: 92 MG/DL
MCH RBC QN AUTO: 27.3 PG (ref 26.5–33)
MCHC RBC AUTO-ENTMCNC: 32.5 G/DL (ref 31.5–36.5)
MCV RBC AUTO: 84 FL (ref 78–100)
MICROALBUMIN UR-MCNC: 53.6 MG/L
MICROALBUMIN/CREAT UR: 47.02 MG/G CR (ref 0–17)
NONHDLC SERPL-MCNC: 126 MG/DL
PLATELET # BLD AUTO: 166 10E3/UL (ref 150–450)
POTASSIUM SERPL-SCNC: 3.7 MMOL/L (ref 3.4–5.3)
PROT SERPL-MCNC: 7.4 G/DL (ref 6.4–8.3)
PSA SERPL DL<=0.01 NG/ML-MCNC: 0.29 NG/ML (ref 0–3.5)
RBC # BLD AUTO: 5.45 10E6/UL (ref 4.4–5.9)
SODIUM SERPL-SCNC: 137 MMOL/L (ref 135–145)
TRIGL SERPL-MCNC: 168 MG/DL
URATE SERPL-MCNC: 5.8 MG/DL (ref 3.4–7)
WBC # BLD AUTO: 3.9 10E3/UL (ref 4–11)

## 2024-09-03 PROCEDURE — G0103 PSA SCREENING: HCPCS | Performed by: INTERNAL MEDICINE

## 2024-09-03 PROCEDURE — 80053 COMPREHEN METABOLIC PANEL: CPT | Performed by: INTERNAL MEDICINE

## 2024-09-03 PROCEDURE — 99214 OFFICE O/P EST MOD 30 MIN: CPT | Mod: 25 | Performed by: INTERNAL MEDICINE

## 2024-09-03 PROCEDURE — 80061 LIPID PANEL: CPT | Performed by: INTERNAL MEDICINE

## 2024-09-03 PROCEDURE — 99396 PREV VISIT EST AGE 40-64: CPT | Performed by: INTERNAL MEDICINE

## 2024-09-03 PROCEDURE — 85027 COMPLETE CBC AUTOMATED: CPT | Performed by: INTERNAL MEDICINE

## 2024-09-03 PROCEDURE — 82043 UR ALBUMIN QUANTITATIVE: CPT | Performed by: INTERNAL MEDICINE

## 2024-09-03 PROCEDURE — 84550 ASSAY OF BLOOD/URIC ACID: CPT | Performed by: INTERNAL MEDICINE

## 2024-09-03 PROCEDURE — 83036 HEMOGLOBIN GLYCOSYLATED A1C: CPT | Performed by: INTERNAL MEDICINE

## 2024-09-03 PROCEDURE — 82570 ASSAY OF URINE CREATININE: CPT | Performed by: INTERNAL MEDICINE

## 2024-09-03 PROCEDURE — 36415 COLL VENOUS BLD VENIPUNCTURE: CPT | Performed by: INTERNAL MEDICINE

## 2024-09-03 RX ORDER — AMLODIPINE BESYLATE 2.5 MG/1
2.5 TABLET ORAL DAILY
Qty: 90 TABLET | Refills: 1 | Status: SHIPPED | OUTPATIENT
Start: 2024-09-03

## 2024-09-03 RX ORDER — METFORMIN HCL 500 MG
1500 TABLET, EXTENDED RELEASE 24 HR ORAL
Qty: 270 TABLET | Refills: 1 | Status: CANCELLED | OUTPATIENT
Start: 2024-09-03

## 2024-09-03 ASSESSMENT — ANXIETY QUESTIONNAIRES
GAD7 TOTAL SCORE: 3
7. FEELING AFRAID AS IF SOMETHING AWFUL MIGHT HAPPEN: NOT AT ALL
8. IF YOU CHECKED OFF ANY PROBLEMS, HOW DIFFICULT HAVE THESE MADE IT FOR YOU TO DO YOUR WORK, TAKE CARE OF THINGS AT HOME, OR GET ALONG WITH OTHER PEOPLE?: NOT DIFFICULT AT ALL
GAD7 TOTAL SCORE: 3
GAD7 TOTAL SCORE: 3

## 2024-09-03 ASSESSMENT — PATIENT HEALTH QUESTIONNAIRE - PHQ9
10. IF YOU CHECKED OFF ANY PROBLEMS, HOW DIFFICULT HAVE THESE PROBLEMS MADE IT FOR YOU TO DO YOUR WORK, TAKE CARE OF THINGS AT HOME, OR GET ALONG WITH OTHER PEOPLE: SOMEWHAT DIFFICULT
SUM OF ALL RESPONSES TO PHQ QUESTIONS 1-9: 5
SUM OF ALL RESPONSES TO PHQ QUESTIONS 1-9: 5

## 2024-09-03 NOTE — PROGRESS NOTES
Preventive Care Visit  [facility]  Piter Jefferson MD, Internal Medicine  Sep 3, 2024      Assessment & Plan     Routine general medical examination at a health care facility  Patient consider updating routine vaccines accordingly  - Lipid panel reflex to direct LDL Non-fasting; Future  - CBC with platelets; Future  - Comprehensive metabolic panel; Future  - Uric acid; Future    Type 2 diabetes mellitus with other specified complication, without long-term current use of insulin (H)  Labs ordered as fasting per routine.  Offered oral therapy but patient declined.  Suggest increasing Ozempic to 1 mg dose.  Advised patient to work on diet and cut back on alcohol use  - Lipid panel reflex to direct LDL Non-fasting; Future  - Albumin Random Urine Quantitative with Creat Ratio; Future  - Comprehensive metabolic panel; Future  - Hemoglobin A1c; Future  - OFFICE/OUTPT VISIT,EST,LEVL IV  - Semaglutide, 1 MG/DOSE, (OZEMPIC) 4 MG/3ML pen; Inject 1 mg subcutaneously every 7 days.    Hyperlipidemia LDL goal <100  Ordered as fasting for routine and added statin use although patient reluctant.  Will check lipid panel.  - Lipid panel reflex to direct LDL Non-fasting; Future    Benign essential hypertension  Poorly controlled.  Reinitiate amlodipine 2.5 mg.  Leg swelling appears to be more with patient at higher doses.  - amLODIPine (NORVASC) 2.5 MG tablet; Take 1 tablet (2.5 mg) by mouth daily.  - OFFICE/OUTPT VISIT,EST,LEVL IV    Major depressive disorder, recurrent episode, mild (H24)  Stable per patient without distinct active complaint    Morbid obesity due to excess calories (H)  Increase Ozempic to 1 mg dose.  - Semaglutide, 1 MG/DOSE, (OZEMPIC) 4 MG/3ML pen; Inject 1 mg subcutaneously every 7 days.    Acute alcoholic hepatitis (H28)  Discussed with patient the importance need to discontinue ongoing alcohol use.    Screening for prostate cancer  Ordered as routine screening and follow-up.  - Prostate  "Specific Antigen Screen; Future    Colon cancer screening  Suggest he may benefit from 5-year screening colonoscopy based on strong family history of colon cancer in father at early age  - Colonoscopy Screening  Referral; Future    Skin tag  Advised excision per dermatology.  Skin lesion appears benign  - OFFICE/OUTPT VISIT,ABI HENNESSY IV  - Adult Dermatology  Referral; Future    Patient has been advised of split billing requirements and indicates understanding: Yes        BMI  Estimated body mass index is 49.33 kg/m  as calculated from the following:    Height as of this encounter: 1.803 m (5' 11\").    Weight as of this encounter: 160.4 kg (353 lb 11.2 oz).   Weight management plan: Discussed healthy diet and exercise guidelines    Counseling  Appropriate preventive services were addressed with this patient via screening, questionnaire, or discussion as appropriate for fall prevention, nutrition, physical activity, Tobacco-use cessation, social engagement, weight loss and cognition.  Checklist reviewing preventive services available has been given to the patient.  Reviewed patient's diet, addressing concerns and/or questions.   He is at risk for lack of exercise and has been provided with information to increase physical activity for the benefit of his well-being.   The patient reports drinking more than 3 alcoholic drinks per day and/or more than 7 drhnks per week. The patient was counseled and given information about possible harmful effects of excessive alcohol intake.The patient's PHQ-9 score is consistent with mild depression. He was provided with information regarding depression.       Work on weight loss  Regular exercise    Bryan Coronel is a 54 year old, presenting for the following:  Physical       Health Care Directive  Patient does not have a Health Care Directive or Living Will: Discussed advance care planning with patient; however, patient declined at this time.    HPI    Other " concerns:  Stopped his amlodipine.  Blood pressure has gone up.  Has been drinking quite a bit recently.  A1c also noted to be higher due to poor nutritional issues.  Discussed with patient both factors.  2.  Patient also has skin tag noted right lower lateral abdominal wall which she would like removed  3.  Patient has also had mild tremor as a child and notes better with alcohol use.  No resting component.  Notes left greater than right hand more so        9/1/2024   General Health   How would you rate your overall physical health? (!) POOR   Feel stress (tense, anxious, or unable to sleep) Rather much      (!) STRESS CONCERN      9/1/2024   Nutrition   Three or more servings of calcium each day? (!) NO   Diet: Diabetic   How many servings of fruit and vegetables per day? (!) 0-1   How many sweetened beverages each day? 0-1            9/1/2024   Exercise   Days per week of moderate/strenous exercise 1 day   Average minutes spent exercising at this level 30 min      (!) EXERCISE CONCERN      9/1/2024   Social Factors   Frequency of gathering with friends or relatives Once a week   Worry food won't last until get money to buy more No   Food not last or not have enough money for food? No   Do you have housing? (Housing is defined as stable permanent housing and does not include staying ouside in a car, in a tent, in an abandoned building, in an overnight shelter, or couch-surfing.) Yes   Are you worried about losing your housing? No   Lack of transportation? No   Unable to get utilities (heat,electricity)? No            9/1/2024   Fall Risk   Fallen 2 or more times in the past year? No   Trouble with walking or balance? No             9/1/2024   Dental   Dentist two times every year? Yes            9/1/2024   TB Screening   Were you born outside of the [place]? No          Today's PHQ-9 Score:       9/3/2024     8:53 AM   PHQ-9 SCORE   PHQ-9 Total Score MyChart 5 (Mild depression)   PHQ-9 Total Score 5     Answers  submitted by the patient for this visit:  Patient Health Questionnaire (Submitted on 9/3/2024)  If you checked off any problems, how difficult have these problems made it for you to do your work, take care of things at home, or get along with other people?:  Minimal concern  PHQ9 TOTAL SCORE: 5  Patient Health Questionnaire (G7) (Submitted on 9/3/2024)  JUAN C 7 TOTAL SCORE: 3            2024   Substance Use   Alcohol more than 3/day or more than 7/wk Yes   How often do you have a drink containing alcohol 2 to 4 times a month   How many alcohol drinks on typical day 10 or more   How often do you have 5+ drinks at one occasion Monthly   Audit 2/3 Score 6   How often not able to stop drinking once started Monthly   How often failed to do what normally expected Less than monthly   How often needed first drink in am after a heavy drinking session Never   How often feeling of guilt or remorse after drinking Less than monthly   How often unable to remember what happened the night before Less than monthly   Have you or someone else been injured because of your drinking No   Has anyone been concerned or suggested you cut down on drinking Yes, during the last year   TOTAL SCORE - AUDIT 17   Do you use any other substances recreationally? No        Social History     Tobacco Use    Smoking status: Former     Current packs/day: 0.00     Types: Cigarettes     Quit date: 1999     Years since quittin.6    Smokeless tobacco: Current     Types: Snuff   Vaping Use    Vaping status: Never Used   Substance Use Topics    Alcohol use: Yes    Drug use: No           2024   STI Screening   New sexual partner(s) since last STI/HIV test? No      Last PSA:   PSA   Date Value Ref Range Status   2021 0.46 0 - 4 ug/L Final     Comment:     Assay Method:  Chemiluminescence using Siemens Vista analyzer     Prostate Specific Antigen Screen   Date Value Ref Range Status   2023 0.27 0.00 - 3.50 ng/mL Final     ASCVD Risk  "  The 10-year ASCVD risk score (Yumi MURCIA, et al., 2019) is: 23.6%    Values used to calculate the score:      Age: 54 years      Sex: Male      Is Non- : No      Diabetic: Yes      Tobacco smoker: No      Systolic Blood Pressure: 180 mmHg      Is BP treated: Yes      HDL Cholesterol: 36 mg/dL      Total Cholesterol: 158 mg/dL        Reviewed and updated as needed this visit by Provider                    Lab work is in process      Review of Systems  CONSTITUTIONAL: NEGATIVE for fever, chills, + change in weight  EYES: NEGATIVE for vision changes or irritation  ENT/MOUTH: NEGATIVE for ear, mouth and throat problems  RESP: NEGATIVE for significant cough or SOB  CV: NEGATIVE for chest pain, palpitations or peripheral edema  GI: NEGATIVE for nausea, abdominal pain, heartburn, or change in bowel habits  : NEGATIVE for frequency, dysuria, or hematuria  MUSCULOSKELETAL: NEGATIVE for significant arthralgias or myalgia  NEURO: NEGATIVE for weakness, dizziness or paresthesias.  Prior history of tremor dating back many years  HEME: NEGATIVE for bleeding problems  PSYCHIATRIC: NEGATIVE for changes in mood or affect     Objective    Exam  BP (!) 180/111   Pulse 74   Temp 98  F (36.7  C) (Temporal)   Resp 18   Ht 1.803 m (5' 11\")   Wt (!) 160.4 kg (353 lb 11.2 oz)   SpO2 95%   BMI 49.33 kg/m     Estimated body mass index is 49.33 kg/m  as calculated from the following:    Height as of this encounter: 1.803 m (5' 11\").    Weight as of this encounter: 160.4 kg (353 lb 11.2 oz).    Physical Exam  GENERAL: alert and no distress  EYES: Eyes grossly normal to inspection, PERRL and conjunctivae and sclerae normal  HENT: ear canals and TM's normal, nose and mouth without ulcers or lesions  NECK: no adenopathy, no asymmetry, masses, or scars  RESP: lungs clear to auscultation - no rales, rhonchi or wheezes  CV: regular rate and rhythm, normal S1 S2, no S3 or S4, no murmur, click or rub, no " peripheral edema  ABDOMEN: soft, nontender, no hepatosplenomegaly, no masses and bowel sounds normal  ABDOMEN: obese  MS: no gross musculoskeletal defects noted, no edema  NEURO: No focal changes, mild tremulousness to head and hands left greater than right.  Small skin colored pedunculated skin tag noted right lateral lower abdomen  PSYCH: mentation appears normal, affect normal/bright        Signed Electronically by: Piter Jefferson MD

## 2024-09-22 DIAGNOSIS — E11.69 TYPE 2 DIABETES MELLITUS WITH OTHER SPECIFIED COMPLICATION, WITHOUT LONG-TERM CURRENT USE OF INSULIN (H): ICD-10-CM

## 2024-09-23 RX ORDER — METFORMIN HCL 500 MG
TABLET, EXTENDED RELEASE 24 HR ORAL
Qty: 270 TABLET | Refills: 0 | Status: SHIPPED | OUTPATIENT
Start: 2024-09-23

## 2024-09-28 DIAGNOSIS — E66.01 MORBID OBESITY DUE TO EXCESS CALORIES (H): ICD-10-CM

## 2024-09-28 DIAGNOSIS — E11.69 TYPE 2 DIABETES MELLITUS WITH OTHER SPECIFIED COMPLICATION, WITHOUT LONG-TERM CURRENT USE OF INSULIN (H): ICD-10-CM

## 2024-09-30 RX ORDER — SEMAGLUTIDE 1.34 MG/ML
INJECTION, SOLUTION SUBCUTANEOUS
Qty: 3 ML | Refills: 0 | Status: SHIPPED | OUTPATIENT
Start: 2024-09-30

## 2024-10-24 DIAGNOSIS — E11.69 TYPE 2 DIABETES MELLITUS WITH OTHER SPECIFIED COMPLICATION, WITHOUT LONG-TERM CURRENT USE OF INSULIN (H): ICD-10-CM

## 2024-10-24 DIAGNOSIS — E66.01 MORBID OBESITY DUE TO EXCESS CALORIES (H): ICD-10-CM

## 2024-10-24 RX ORDER — SEMAGLUTIDE 1.34 MG/ML
INJECTION, SOLUTION SUBCUTANEOUS
Qty: 3 ML | Refills: 3 | Status: SHIPPED | OUTPATIENT
Start: 2024-10-24

## 2024-11-19 DIAGNOSIS — F33.0 MAJOR DEPRESSIVE DISORDER, RECURRENT EPISODE, MILD (H): ICD-10-CM

## 2024-11-19 DIAGNOSIS — I10 UNCONTROLLED HYPERTENSION: ICD-10-CM

## 2024-11-19 DIAGNOSIS — I10 BENIGN ESSENTIAL HYPERTENSION: ICD-10-CM

## 2024-11-19 RX ORDER — LISINOPRIL 40 MG/1
TABLET ORAL
Qty: 90 TABLET | Refills: 0 | Status: SHIPPED | OUTPATIENT
Start: 2024-11-19

## 2024-11-19 RX ORDER — PAROXETINE 30 MG/1
30 TABLET, FILM COATED ORAL EVERY MORNING
Qty: 90 TABLET | Refills: 0 | Status: SHIPPED | OUTPATIENT
Start: 2024-11-19

## 2024-11-19 RX ORDER — METOPROLOL SUCCINATE 100 MG/1
100 TABLET, EXTENDED RELEASE ORAL DAILY
Qty: 90 TABLET | Refills: 0 | Status: SHIPPED | OUTPATIENT
Start: 2024-11-19

## 2024-11-19 RX ORDER — HYDROCHLOROTHIAZIDE 12.5 MG/1
25 TABLET ORAL DAILY
Qty: 180 TABLET | Refills: 0 | Status: SHIPPED | OUTPATIENT
Start: 2024-11-19